# Patient Record
Sex: FEMALE | Race: BLACK OR AFRICAN AMERICAN | Employment: FULL TIME | URBAN - METROPOLITAN AREA
[De-identification: names, ages, dates, MRNs, and addresses within clinical notes are randomized per-mention and may not be internally consistent; named-entity substitution may affect disease eponyms.]

---

## 2017-11-15 ENCOUNTER — APPOINTMENT (OUTPATIENT)
Dept: GENERAL RADIOLOGY | Age: 52
End: 2017-11-15
Attending: PHYSICIAN ASSISTANT
Payer: SELF-PAY

## 2017-11-15 ENCOUNTER — HOSPITAL ENCOUNTER (EMERGENCY)
Age: 52
Discharge: HOME | End: 2017-11-15
Attending: EMERGENCY MEDICINE
Payer: SELF-PAY

## 2017-11-15 VITALS
TEMPERATURE: 98 F | HEIGHT: 67 IN | DIASTOLIC BLOOD PRESSURE: 78 MMHG | SYSTOLIC BLOOD PRESSURE: 126 MMHG | OXYGEN SATURATION: 97 % | BODY MASS INDEX: 45.99 KG/M2 | HEART RATE: 56 BPM | WEIGHT: 293 LBS | RESPIRATION RATE: 18 BRPM

## 2017-11-15 DIAGNOSIS — R10.9 FLANK PAIN: Primary | ICD-10-CM

## 2017-11-15 DIAGNOSIS — N39.0 URINARY TRACT INFECTION WITHOUT HEMATURIA, SITE UNSPECIFIED: ICD-10-CM

## 2017-11-15 LAB
ALBUMIN SERPL-MCNC: 3.5 G/DL (ref 3.4–5)
ALBUMIN/GLOB SERPL: 0.9 {RATIO} (ref 0.8–1.7)
ALP SERPL-CCNC: 48 U/L (ref 45–117)
ALT SERPL-CCNC: 14 U/L (ref 13–56)
ANION GAP SERPL CALC-SCNC: 4 MMOL/L (ref 3–18)
APPEARANCE UR: CLEAR
AST SERPL-CCNC: 14 U/L (ref 15–37)
BACTERIA URNS QL MICRO: ABNORMAL /HPF
BASOPHILS # BLD: 0 K/UL (ref 0–0.06)
BASOPHILS NFR BLD: 1 % (ref 0–2)
BILIRUB SERPL-MCNC: 0.4 MG/DL (ref 0.2–1)
BILIRUB UR QL: NEGATIVE
BUN SERPL-MCNC: 15 MG/DL (ref 7–18)
BUN/CREAT SERPL: 25 (ref 12–20)
CALCIUM SERPL-MCNC: 8.9 MG/DL (ref 8.5–10.1)
CHLORIDE SERPL-SCNC: 105 MMOL/L (ref 100–108)
CO2 SERPL-SCNC: 29 MMOL/L (ref 21–32)
COLOR UR: YELLOW
CREAT SERPL-MCNC: 0.59 MG/DL (ref 0.6–1.3)
DIFFERENTIAL METHOD BLD: ABNORMAL
EOSINOPHIL # BLD: 0.1 K/UL (ref 0–0.4)
EOSINOPHIL NFR BLD: 3 % (ref 0–5)
EPITH CASTS URNS QL MICRO: ABNORMAL /LPF (ref 0–5)
ERYTHROCYTE [DISTWIDTH] IN BLOOD BY AUTOMATED COUNT: 15.6 % (ref 11.6–14.5)
GLOBULIN SER CALC-MCNC: 3.9 G/DL (ref 2–4)
GLUCOSE SERPL-MCNC: 87 MG/DL (ref 74–99)
GLUCOSE UR STRIP.AUTO-MCNC: NEGATIVE MG/DL
HCT VFR BLD AUTO: 35.8 % (ref 35–45)
HGB BLD-MCNC: 11.1 G/DL (ref 12–16)
HGB UR QL STRIP: NEGATIVE
KETONES UR QL STRIP.AUTO: NEGATIVE MG/DL
LEUKOCYTE ESTERASE UR QL STRIP.AUTO: ABNORMAL
LYMPHOCYTES # BLD: 1.4 K/UL (ref 0.9–3.6)
LYMPHOCYTES NFR BLD: 32 % (ref 21–52)
MCH RBC QN AUTO: 25.6 PG (ref 24–34)
MCHC RBC AUTO-ENTMCNC: 31 G/DL (ref 31–37)
MCV RBC AUTO: 82.5 FL (ref 74–97)
MONOCYTES # BLD: 0.4 K/UL (ref 0.05–1.2)
MONOCYTES NFR BLD: 9 % (ref 3–10)
MUCOUS THREADS URNS QL MICRO: ABNORMAL /LPF
NEUTS SEG # BLD: 2.3 K/UL (ref 1.8–8)
NEUTS SEG NFR BLD: 55 % (ref 40–73)
NITRITE UR QL STRIP.AUTO: NEGATIVE
PH UR STRIP: 7 [PH] (ref 5–8)
PLATELET # BLD AUTO: 268 K/UL (ref 135–420)
PMV BLD AUTO: 8.9 FL (ref 9.2–11.8)
POTASSIUM SERPL-SCNC: 4 MMOL/L (ref 3.5–5.5)
PROT SERPL-MCNC: 7.4 G/DL (ref 6.4–8.2)
PROT UR STRIP-MCNC: NEGATIVE MG/DL
RBC # BLD AUTO: 4.34 M/UL (ref 4.2–5.3)
RBC #/AREA URNS HPF: ABNORMAL /HPF (ref 0–5)
SODIUM SERPL-SCNC: 138 MMOL/L (ref 136–145)
SP GR UR REFRACTOMETRY: 1.03 (ref 1–1.03)
UROBILINOGEN UR QL STRIP.AUTO: 1 EU/DL (ref 0.2–1)
WBC # BLD AUTO: 4.2 K/UL (ref 4.6–13.2)
WBC URNS QL MICRO: ABNORMAL /HPF (ref 0–4)

## 2017-11-15 PROCEDURE — 85025 COMPLETE CBC W/AUTO DIFF WBC: CPT | Performed by: PHYSICIAN ASSISTANT

## 2017-11-15 PROCEDURE — 96374 THER/PROPH/DIAG INJ IV PUSH: CPT

## 2017-11-15 PROCEDURE — 96361 HYDRATE IV INFUSION ADD-ON: CPT

## 2017-11-15 PROCEDURE — 74011250636 HC RX REV CODE- 250/636: Performed by: PHYSICIAN ASSISTANT

## 2017-11-15 PROCEDURE — 80053 COMPREHEN METABOLIC PANEL: CPT | Performed by: PHYSICIAN ASSISTANT

## 2017-11-15 PROCEDURE — 74000 XR ABD (KUB): CPT

## 2017-11-15 PROCEDURE — 87086 URINE CULTURE/COLONY COUNT: CPT | Performed by: PHYSICIAN ASSISTANT

## 2017-11-15 PROCEDURE — 81001 URINALYSIS AUTO W/SCOPE: CPT | Performed by: PHYSICIAN ASSISTANT

## 2017-11-15 PROCEDURE — 99283 EMERGENCY DEPT VISIT LOW MDM: CPT

## 2017-11-15 RX ORDER — KETOROLAC TROMETHAMINE 30 MG/ML
30 INJECTION, SOLUTION INTRAMUSCULAR; INTRAVENOUS
Status: COMPLETED | OUTPATIENT
Start: 2017-11-15 | End: 2017-11-15

## 2017-11-15 RX ORDER — CIPROFLOXACIN 500 MG/1
500 TABLET ORAL 2 TIMES DAILY
Qty: 14 TAB | Refills: 0 | Status: SHIPPED | OUTPATIENT
Start: 2017-11-15 | End: 2017-11-22

## 2017-11-15 RX ORDER — SODIUM CHLORIDE 9 MG/ML
1000 INJECTION, SOLUTION INTRAVENOUS ONCE
Status: COMPLETED | OUTPATIENT
Start: 2017-11-15 | End: 2017-11-15

## 2017-11-15 RX ORDER — TRAMADOL HYDROCHLORIDE 50 MG/1
50 TABLET ORAL
Qty: 12 TAB | Refills: 0 | Status: SHIPPED | OUTPATIENT
Start: 2017-11-15 | End: 2017-12-05

## 2017-11-15 RX ADMIN — SODIUM CHLORIDE 1000 ML: 900 INJECTION, SOLUTION INTRAVENOUS at 14:25

## 2017-11-15 RX ADMIN — KETOROLAC TROMETHAMINE 30 MG: 30 INJECTION, SOLUTION INTRAMUSCULAR at 14:25

## 2017-11-15 NOTE — ED NOTES
Current Discharge Medication List      START taking these medications    Details   ciprofloxacin HCl (CIPRO) 500 mg tablet Take 1 Tab by mouth two (2) times a day for 7 days. Qty: 14 Tab, Refills: 0      traMADol (ULTRAM) 50 mg tablet Take 1 Tab by mouth every six (6) hours as needed for Pain. Max Daily Amount: 200 mg. Qty: 12 Tab, Refills: 0           Patient armband removed and shredded  Prescriptions given and reviewed with patient.

## 2017-11-15 NOTE — ED PROVIDER NOTES
HPI Comments: 46 yr old female, hx asthma presents to the ED complaining of R flank and low back pain x 2 days. Pt denies urinary sx, back injury radiation of pain into the legs or abdomen, and numbness/tingling. Denies any hx of kidney stones. No other complaints. Patient is a 46 y.o. female presenting with flank pain. Flank Pain    Pertinent negatives include no chest pain, no fever, no headaches, no abdominal pain, no dysuria and no weakness. Past Medical History:   Diagnosis Date    Asthma        Past Surgical History:   Procedure Laterality Date    HX  SECTION           History reviewed. No pertinent family history. Social History     Social History    Marital status:      Spouse name: N/A    Number of children: N/A    Years of education: N/A     Occupational History    Not on file. Social History Main Topics    Smoking status: Never Smoker    Smokeless tobacco: Not on file    Alcohol use No    Drug use: No    Sexual activity: Not on file     Other Topics Concern    Not on file     Social History Narrative         ALLERGIES: Tetanus and diphtheria toxoids    Review of Systems   Constitutional: Negative. Negative for chills, diaphoresis, fatigue and fever. HENT: Negative. Negative for congestion, ear pain, rhinorrhea and sore throat. Eyes: Negative. Negative for pain and redness. Respiratory: Negative. Negative for cough, shortness of breath, wheezing and stridor. Cardiovascular: Negative. Negative for chest pain and leg swelling. Gastrointestinal: Negative. Negative for abdominal pain, constipation, diarrhea, nausea and vomiting. Endocrine: Negative. Genitourinary: Positive for flank pain. Negative for dysuria, frequency and hematuria. Musculoskeletal: Positive for back pain. Negative for neck pain and neck stiffness. Skin: Negative. Negative for rash and wound. Allergic/Immunologic: Negative. Neurological: Negative.   Negative for dizziness, seizures, syncope, weakness and headaches. Hematological: Negative. Psychiatric/Behavioral: Negative. All other systems reviewed and are negative. Vitals:    11/15/17 1318   BP: (!) 146/101   Pulse: 69   Resp: 18   Temp: 98 °F (36.7 °C)   SpO2: 97%   Weight: (!) 186 kg (410 lb)   Height: 5' 7\" (1.702 m)            Physical Exam   Constitutional: She is oriented to person, place, and time. She appears well-developed and well-nourished. She appears distressed. Morbidly obese, Pt appear minimally distressed in the exam room    HENT:   Head: Normocephalic. Neck: Normal range of motion. Neck supple. Cardiovascular: Normal rate, regular rhythm and normal heart sounds. Exam reveals no gallop and no friction rub. No murmur heard. Pulmonary/Chest: Effort normal and breath sounds normal. No stridor. No respiratory distress. She has no wheezes. She has no rales. R CVA TTP    Musculoskeletal: Normal range of motion. She exhibits tenderness. She exhibits no edema or deformity. Diffuse TTP noted along the R lumbar and thoracic paraspinal muscles, no radicular or referred pain elicited. No specific point tenderness noted. Neurological: She is alert and oriented to person, place, and time. Coordination normal.   Gait is steady. Able to ambulate without difficulty. Skin: Skin is warm and dry. No rash noted. She is not diaphoretic. No erythema. Psychiatric: She has a normal mood and affect. Her behavior is normal. Thought content normal.   Nursing note and vitals reviewed. MDM  Number of Diagnoses or Management Options  Diagnosis management comments: Impression:  Flank pain, UTI    KUB no evidence of calcific renal stones seen, unremarkable for acute process, will not  IV inserted, 1 L saline, 30 mg toradol given     WBC 4.2, hgb 11.1, RDW 15.6, MPLV 8.9, Cr 0.59, BUCR 25, SGOT 14, UA: trace leuk esterase.  2 + bacteria, urine sent for culture    Recheck BP WNL  Pt denies hx of htn. Pt is morbidly obese. Will plan to give her information for dietary aproach to controlling htn and recommend PCP follow-up. Patient is stable for discharge at this time. Rx for cipro and short course of ultram given. Rest and follow-up with PCP this week. Return to the ED immediately for any new or worsening sx.  Chandni Arrington PA-C 3:06 PM         Amount and/or Complexity of Data Reviewed  Clinical lab tests: reviewed and ordered  Tests in the radiology section of CPT®: ordered and reviewed    Risk of Complications, Morbidity, and/or Mortality  Presenting problems: moderate  Diagnostic procedures: moderate  Management options: moderate    Patient Progress  Patient progress: stable    ED Course       Procedures

## 2017-11-15 NOTE — DISCHARGE INSTRUCTIONS
[employer] Activation    Thank you for requesting access to [employer]. Please follow the instructions below to securely access and download your online medical record. [employer] allows you to send messages to your doctor, view your test results, renew your prescriptions, schedule appointments, and more. How Do I Sign Up? 1. In your internet browser, go to [email or web address]  2. Click on the First Time User? Click Here link in the Sign In box. You will be redirect to the New Member Sign Up page. 3. Enter your [employer] Access Code exactly as it appears below. You will not need to use this code after youve completed the sign-up process. If you do not sign up before the expiration date, you must request a new code. [employer] Access Code: [email or web address]  Expires: 2018  3:07 PM (This is the date your [employer] access code will )    4. Enter the last four digits of your Social Security Number (xxxx) and Date of Birth (mm/dd/yyyy) as indicated and click Submit. You will be taken to the next sign-up page. 5. Create a [employer] ID. This will be your [employer] login ID and cannot be changed, so think of one that is secure and easy to remember. 6. Create a [employer] password. You can change your password at any time. 7. Enter your Password Reset Question and Answer. This can be used at a later time if you forget your password. 8. Enter your e-mail address. You will receive e-mail notification when new information is available in [address] 9. Click Sign Up. You can now view and download portions of your medical record. 10. Click the Download Summary menu link to download a portable copy of your medical information. Additional Information    If you have questions, please visit the Frequently Asked Questions section of the [employer] website at [email or web address] Remember, [employer] is NOT to be used for urgent needs. For medical emergencies, dial 911.

## 2017-11-15 NOTE — ED TRIAGE NOTES
Patient c/o right flank pain x 2 days. Denies urinary difficulties or hx of kidney stones. Denies injury.

## 2017-11-17 LAB
BACTERIA SPEC CULT: NORMAL
SERVICE CMNT-IMP: NORMAL

## 2017-12-05 ENCOUNTER — HOSPITAL ENCOUNTER (EMERGENCY)
Age: 52
Discharge: HOME | End: 2017-12-05
Attending: EMERGENCY MEDICINE
Payer: SELF-PAY

## 2017-12-05 VITALS
SYSTOLIC BLOOD PRESSURE: 163 MMHG | TEMPERATURE: 98 F | RESPIRATION RATE: 22 BRPM | OXYGEN SATURATION: 100 % | HEART RATE: 83 BPM | DIASTOLIC BLOOD PRESSURE: 80 MMHG

## 2017-12-05 DIAGNOSIS — M54.10 RADICULOPATHY AFFECTING UPPER EXTREMITY: Primary | ICD-10-CM

## 2017-12-05 DIAGNOSIS — R03.0 SYSTOLIC BLOOD PRESSURE GREATER THAN OR EQUAL TO 140 MM HG: ICD-10-CM

## 2017-12-05 LAB
ATRIAL RATE: 66 BPM
CALCULATED P AXIS, ECG09: 40 DEGREES
CALCULATED R AXIS, ECG10: 17 DEGREES
CALCULATED T AXIS, ECG11: 40 DEGREES
DIAGNOSIS, 93000: NORMAL
P-R INTERVAL, ECG05: 158 MS
Q-T INTERVAL, ECG07: 372 MS
QRS DURATION, ECG06: 92 MS
QTC CALCULATION (BEZET), ECG08: 389 MS
VENTRICULAR RATE, ECG03: 66 BPM

## 2017-12-05 PROCEDURE — 74011250636 HC RX REV CODE- 250/636: Performed by: EMERGENCY MEDICINE

## 2017-12-05 PROCEDURE — 93005 ELECTROCARDIOGRAM TRACING: CPT

## 2017-12-05 PROCEDURE — 99284 EMERGENCY DEPT VISIT MOD MDM: CPT

## 2017-12-05 PROCEDURE — 96374 THER/PROPH/DIAG INJ IV PUSH: CPT

## 2017-12-05 RX ORDER — CYCLOBENZAPRINE HCL 10 MG
10 TABLET ORAL
Qty: 20 TAB | Refills: 0 | Status: SHIPPED | OUTPATIENT
Start: 2017-12-05 | End: 2018-02-16

## 2017-12-05 RX ORDER — KETOROLAC TROMETHAMINE 30 MG/ML
15 INJECTION, SOLUTION INTRAMUSCULAR; INTRAVENOUS
Status: COMPLETED | OUTPATIENT
Start: 2017-12-05 | End: 2017-12-05

## 2017-12-05 RX ORDER — HYDROCODONE BITARTRATE AND ACETAMINOPHEN 5; 325 MG/1; MG/1
1 TABLET ORAL
Qty: 20 TAB | Refills: 0 | Status: SHIPPED | OUTPATIENT
Start: 2017-12-05 | End: 2018-02-16

## 2017-12-05 RX ORDER — PREDNISONE 20 MG/1
60 TABLET ORAL
Qty: 18 TAB | Refills: 0 | Status: SHIPPED | OUTPATIENT
Start: 2017-12-05 | End: 2018-02-16

## 2017-12-05 RX ORDER — NAPROXEN 375 MG/1
375 TABLET ORAL 2 TIMES DAILY WITH MEALS
Qty: 30 TAB | Refills: 0 | Status: SHIPPED | OUTPATIENT
Start: 2017-12-05 | End: 2018-02-16

## 2017-12-05 RX ADMIN — KETOROLAC TROMETHAMINE 15 MG: 30 INJECTION, SOLUTION INTRAMUSCULAR at 10:55

## 2017-12-05 NOTE — ED TRIAGE NOTES
C/O left sided neck, shoulder and arm pain x 1 week and numbness to left hand x 2 days. Denies injury.

## 2017-12-05 NOTE — ED PROVIDER NOTES
EMERGENCY DEPARTMENT HISTORY AND PHYSICAL EXAM    10:32 AM      Date: 2017  Patient Name: Kaila Dent    History of Presenting Illness     Chief Complaint   Patient presents with    Arm Pain    Neck Pain    Numbness         History Provided By: Patient    Chief Complaint: Neck Pain  Duration: 2 Weeks  Timing:  Constant and Progressive  Location: Left sided radiating down LUE  Quality:   Severity:  Modifying Factors: OTC pain meds without relief   Associated Symptoms: numbness of left thumb x 2days      Additional History (Context): Kaila Dent is a 46 y.o. female with asthma who presents to ED with c/o constant and progressive left sided neck pain that radiates down LUE onset 2 weeks. Pt states pain has increased and numbness of left thumb onset 2 days. Pt reports use of OTC pain medication without sx relief.  present at bedside. PCP: None        Past History     Past Medical History:  Past Medical History:   Diagnosis Date    Asthma        Past Surgical History:  Past Surgical History:   Procedure Laterality Date    HX  SECTION         Family History:  History reviewed. No pertinent family history. Social History:  Social History   Substance Use Topics    Smoking status: Never Smoker    Smokeless tobacco: None    Alcohol use No       Allergies: Allergies   Allergen Reactions    Tetanus And Diphtheria Toxoids Hives and Nausea and Vomiting         Review of Systems       Review of Systems   Constitutional: Negative for chills and fever. HENT: Negative for sore throat. Respiratory: Negative for shortness of breath. Cardiovascular: Negative for chest pain. Gastrointestinal: Negative for diarrhea and vomiting. Musculoskeletal: Positive for neck pain. Skin: Negative for rash. Neurological: Negative for headaches.          Physical Exam     Visit Vitals    /80 (BP 1 Location: Right arm, BP Patient Position: At rest)    Pulse 83    Temp 98 °F (36.7 °C)    Resp 22    LMP 11/04/2017    SpO2 100%         Physical Exam   Constitutional: She is oriented to person, place, and time. She appears well-developed and well-nourished. No distress. HENT:   Head: Normocephalic and atraumatic. Eyes: No scleral icterus. Neck:   Tender L trapezius ridge, rhomboids and lateral deltoid muscle extending to lat humerus. No midline bony tenderness. Cardiovascular: Normal rate and regular rhythm. No murmur heard. Pulmonary/Chest: Breath sounds normal. She is in respiratory distress. Musculoskeletal:   Subjective paresthesia along distal radial nerve distribution L hand. Pulse intact. Motor intact. Neurological: She is alert and oriented to person, place, and time. Skin: Skin is warm and dry. Psychiatric: She has a normal mood and affect. Nursing note and vitals reviewed. Diagnostic Study Results     Labs -  Recent Results (from the past 12 hour(s))   EKG, 12 LEAD, INITIAL    Collection Time: 12/05/17 10:20 AM   Result Value Ref Range    Ventricular Rate 66 BPM    Atrial Rate 66 BPM    P-R Interval 158 ms    QRS Duration 92 ms    Q-T Interval 372 ms    QTC Calculation (Bezet) 389 ms    Calculated P Axis 40 degrees    Calculated R Axis 17 degrees    Calculated T Axis 40 degrees    Diagnosis       Normal sinus rhythm with sinus arrhythmia  Normal ECG  No previous ECGs available         Radiologic Studies -   No orders to display         Medical Decision Making   I am the first provider for this patient. I reviewed the vital signs, available nursing notes, past medical history, past surgical history, family history and social history. Vital Signs-Reviewed the patient's vital signs. EKG: Interpreted by the EP. Time Interpreted: 1561. NSR, normal intervals and axis,, no st elevations        ED Course: Progress Notes, Reevaluation, and Consults:  Imp: LUE radiculopathy w/ likely impingement at level of trapezius. Has radial nerve paresthesia. No midline tenderness. Classic \"pinched nerve\" presentation. No findings to suggest referred cardiac and EKG ok. Diagnosis     Clinical Impression:   1. Radiculopathy affecting upper extremity    2. Systolic blood pressure greater than or equal to 140 mm Hg      1) Prednisone tape  2) Naproxen  3) Flexeril  4) Norco  5) Stretching as tolerated  6) Systolic blood pressure elevated and requires follow up  7) Follow up with PCP or [facility] for recheck in 7-10 days    Disposition: home    Follow-up Information     Follow up With Details Comments [address] [facility] In 1 week  [address] [facility]  [phone]           Patient's Medications   Start Taking    CYCLOBENZAPRINE (FLEXERIL) 10 MG TABLET    Take 1 Tab by mouth three (3) times daily as needed for Muscle Spasm(s). HYDROCODONE-ACETAMINOPHEN (NORCO) 5-325 MG PER TABLET    Take 1 Tab by mouth every four (4) hours as needed for Pain. Max Daily Amount: 6 Tabs. NAPROXEN (NAPROSYN) 375 MG TABLET    Take 1 Tab by mouth two (2) times daily (with meals). PREDNISONE (DELTASONE) 20 MG TABLET    Take 3 Tabs by mouth daily (with breakfast). 60 MG DAILY X 3 DAYS, THEN 40 MG DAILY X 3 DAYS, THEN 20 MG DAILY X 3 DAYS   Continue Taking    No medications on file   These Medications have changed    No medications on file   Stop Taking    TRAMADOL (ULTRAM) 50 MG TABLET    Take 1 Tab by mouth every six (6) hours as needed for Pain. Max Daily Amount: 200 mg.     _______________________________    Attestations:  Scribe Attestation     Chavez Alvarado acting as a scribe for and in the presence of Macarena Rodrigues MD      December 05, 2017 at 10:39 AM       Provider Attestation:      I personally performed the services described in the documentation, reviewed the documentation, as recorded by the scribe in my presence, and it accurately and completely records my words and actions. December 05, 2017 at 10:39 JONO Norman, MD    _______________________________

## 2017-12-05 NOTE — LETTER
NOTIFICATION RETURN TO WORK / SCHOOL 
 
12/5/2017 11:40 AM 
 
Ms. Dalila Ryan 
[address] [facility] [address] [facility] [address] To Whom It May Concern: 
 
Dalila Ryan is currently under the care of [id number] [facility] EMERGENCY DEPT. She will return to work/school on: 12/7/17 If there are questions or concerns please have the patient contact our office. Sincerely, Jazmin Agarwal RN

## 2018-02-16 ENCOUNTER — APPOINTMENT (OUTPATIENT)
Dept: GENERAL RADIOLOGY | Age: 53
End: 2018-02-16
Attending: PHYSICIAN ASSISTANT
Payer: SELF-PAY

## 2018-02-16 ENCOUNTER — HOSPITAL ENCOUNTER (EMERGENCY)
Age: 53
Discharge: HOME | End: 2018-02-16
Attending: EMERGENCY MEDICINE
Payer: SELF-PAY

## 2018-02-16 VITALS
TEMPERATURE: 98.4 F | DIASTOLIC BLOOD PRESSURE: 86 MMHG | RESPIRATION RATE: 19 BRPM | SYSTOLIC BLOOD PRESSURE: 131 MMHG | BODY MASS INDEX: 45.99 KG/M2 | OXYGEN SATURATION: 99 % | HEIGHT: 67 IN | WEIGHT: 293 LBS | HEART RATE: 97 BPM

## 2018-02-16 DIAGNOSIS — R11.2 NON-INTRACTABLE VOMITING WITH NAUSEA, UNSPECIFIED VOMITING TYPE: ICD-10-CM

## 2018-02-16 DIAGNOSIS — R68.89 FLU-LIKE SYMPTOMS: Primary | ICD-10-CM

## 2018-02-16 DIAGNOSIS — R05.9 COUGH: ICD-10-CM

## 2018-02-16 PROCEDURE — 99283 EMERGENCY DEPT VISIT LOW MDM: CPT

## 2018-02-16 PROCEDURE — 71046 X-RAY EXAM CHEST 2 VIEWS: CPT

## 2018-02-16 RX ORDER — BENZONATATE 100 MG/1
100 CAPSULE ORAL
Qty: 30 CAP | Refills: 0 | Status: SHIPPED | OUTPATIENT
Start: 2018-02-16 | End: 2018-02-23

## 2018-02-16 RX ORDER — ALBUTEROL SULFATE 90 UG/1
AEROSOL, METERED RESPIRATORY (INHALATION)
COMMUNITY
End: 2020-08-08

## 2018-02-16 RX ORDER — ONDANSETRON 4 MG/1
4 TABLET, ORALLY DISINTEGRATING ORAL
Qty: 15 TAB | Refills: 0 | Status: SHIPPED | OUTPATIENT
Start: 2018-02-16 | End: 2019-04-05

## 2018-02-16 NOTE — LETTER
[address] [place] [address] [facility] EMERGENCY DEPT 
[address] [facility] [address] [phone] Work/School Note Date: 2/16/2018 To Whom It May concern: 
 
Oneil Aschoff was seen and treated today in the emergency room by the following provider(s): 
Attending Provider: Nathaniel Echevarria MD 
Physician Assistant: Sae Alfaro PA-C. Oneil Aschoff may return to work on 2/18/2018.  
 
Sincerely, 
 
 
 
 
Diane Evans RN

## 2018-02-16 NOTE — ED PROVIDER NOTES
EMERGENCY DEPARTMENT HISTORY AND PHYSICAL EXAM    2:14 PM      Date: 2018  Patient Name: Annie Brennan    History of Presenting Illness     Chief Complaint   Patient presents with    Flu         History Provided By: Patient    Chief Complaint: flu like symptoms   Duration: 1 Weeks  Timing:  Progressive  Location:    Quality:   Severity: Moderate  Modifying Factors:    Associated Symptoms: productive cough, vomiting, chills, sore throat, rhinorrhea, congestion       Additional History (Context): Annie Brennan is a 48 y.o. female with asthma who presents with flu like symptoms x 1 week. Vomiting daily, but still has been able to keep down fluids and rehydrate. No diarrhea or abdominal pain. Denies nasal congestion, rhinorrhea, ear pain, sore throat, sinus pressure. Feels feverish. No chest pain or shortness of breath. PCP: None    Current Outpatient Prescriptions   Medication Sig Dispense Refill    albuterol (PROVENTIL HFA, VENTOLIN HFA, PROAIR HFA) 90 mcg/actuation inhaler Take  by inhalation.  ondansetron (ZOFRAN ODT) 4 mg disintegrating tablet Take 1 Tab by mouth every eight (8) hours as needed for Nausea. 15 Tab 0    benzonatate (TESSALON PERLES) 100 mg capsule Take 1 Cap by mouth three (3) times daily as needed for Cough for up to 7 days. 30 Cap 0       Past History     Past Medical History:  Past Medical History:   Diagnosis Date    Asthma        Past Surgical History:  Past Surgical History:   Procedure Laterality Date    HX  SECTION         Family History:  No family history on file. Social History:  Social History   Substance Use Topics    Smoking status: Never Smoker    Smokeless tobacco: Not on file    Alcohol use No       Allergies: Allergies   Allergen Reactions    Tetanus And Diphtheria Toxoids Hives and Nausea and Vomiting         Review of Systems       Review of Systems   Constitutional: Negative for fever.    HENT: Positive for congestion, rhinorrhea and sore throat. Negative for facial swelling. Eyes: Negative for visual disturbance. Respiratory: Positive for cough. Negative for shortness of breath. Cardiovascular: Negative for chest pain. Gastrointestinal: Positive for nausea and vomiting. Negative for abdominal pain and diarrhea. Genitourinary: Negative for dysuria. Musculoskeletal: Negative for neck pain. Skin: Negative for rash. Neurological: Negative for dizziness. Psychiatric/Behavioral: Negative for confusion. All other systems reviewed and are negative. Physical Exam     Visit Vitals    /86 (BP 1 Location: Left arm, BP Patient Position: At rest)    Pulse 97    Temp 98.4 °F (36.9 °C)    Resp 19    Ht 5' 7\" (1.702 m)    Wt 154.2 kg (340 lb)    SpO2 99%    BMI 53.25 kg/m2         Physical Exam   Constitutional: She is oriented to person, place, and time. She appears well-developed and well-nourished. No distress. HENT:   Head: Normocephalic and atraumatic. Right Ear: Tympanic membrane, external ear and ear canal normal.   Left Ear: Tympanic membrane, external ear and ear canal normal.   Nose: Nose normal. Right sinus exhibits no maxillary sinus tenderness and no frontal sinus tenderness. Left sinus exhibits no maxillary sinus tenderness and no frontal sinus tenderness. Mouth/Throat: Uvula is midline, oropharynx is clear and moist and mucous membranes are normal. No oropharyngeal exudate, posterior oropharyngeal edema, posterior oropharyngeal erythema or tonsillar abscesses. Eyes: Conjunctivae are normal.   Neck: Normal range of motion. Neck supple. Cardiovascular: Normal rate, regular rhythm and normal heart sounds. Pulmonary/Chest: Effort normal and breath sounds normal.   Abdominal: Soft. Bowel sounds are normal. She exhibits no distension. There is no tenderness. Musculoskeletal: Normal range of motion. Lymphadenopathy:     She has no cervical adenopathy.    Neurological: She is alert and oriented to person, place, and time. Skin: Skin is warm and dry. She is not diaphoretic. Psychiatric: She has a normal mood and affect. Nursing note and vitals reviewed. Diagnostic Study Results     Labs -  No results found for this or any previous visit (from the past 12 hour(s)). Radiologic Studies -   XR CHEST PA LAT    (Results Pending)         Medical Decision Making   I am the first provider for this patient. I reviewed the vital signs, available nursing notes, past medical history, past surgical history, family history and social history. Vital Signs-Reviewed the patient's vital signs. Records Reviewed: Nursing Notes (Time of Review: 2:14 PM)    ED Course: Progress Notes, Reevaluation, and Consults:      Provider Notes (Medical Decision Making): MDM  Number of Diagnoses or Management Options  Cough: new, needed workup  Flu-like symptoms: new, needed workup  Non-intractable vomiting with nausea, unspecified vomiting type: new, needed workup  Diagnosis management comments: 50yo F with flu like symptoms x 1 week. Has been hydrating, does not require IVF today. Afebrile, vitals stable. ENT ecxam normal.  Lungs clear. Abdomen nontender. CXR without obvious signs of pneumonia based on independent assessment of images. Treat symptoms. Outside range for tamiflu. Discussed treatment plan, return precautions, symptomatic relief, and expected time to improvement. All questions answered. Patient is stable for discharge and outpatient management. Diagnosis     Clinical Impression:   1. Flu-like symptoms    2. Cough    3.  Non-intractable vomiting with nausea, unspecified vomiting type        Disposition:     Follow-up Information     Follow up With Details Comments Contact Info    [facility] EMERGENCY DEPT In 3 days If symptoms do not improve [address] [address]  [address] [facility] EMERGENCY DEPT  Immediately if symptoms worsen [address] [facility] [address] [address]  [phone]           Patient's Medications   Start Taking    BENZONATATE (TESSALON PERLES) 100 MG CAPSULE    Take 1 Cap by mouth three (3) times daily as needed for Cough for up to 7 days. ONDANSETRON (ZOFRAN ODT) 4 MG DISINTEGRATING TABLET    Take 1 Tab by mouth every eight (8) hours as needed for Nausea. Continue Taking    ALBUTEROL (PROVENTIL HFA, VENTOLIN HFA, PROAIR HFA) 90 MCG/ACTUATION INHALER    Take  by inhalation. These Medications have changed    No medications on file   Stop Taking    CYCLOBENZAPRINE (FLEXERIL) 10 MG TABLET    Take 1 Tab by mouth three (3) times daily as needed for Muscle Spasm(s). HYDROCODONE-ACETAMINOPHEN (NORCO) 5-325 MG PER TABLET    Take 1 Tab by mouth every four (4) hours as needed for Pain. Max Daily Amount: 6 Tabs. NAPROXEN (NAPROSYN) 375 MG TABLET    Take 1 Tab by mouth two (2) times daily (with meals). PREDNISONE (DELTASONE) 20 MG TABLET    Take 3 Tabs by mouth daily (with breakfast). 60 MG DAILY X 3 DAYS, THEN 40 MG DAILY X 3 DAYS, THEN 20 MG DAILY X 3 DAYS     _______________________________    Attestations:  Scribe Attestation     Chico Eng PA-C acting as a scribe for and in the presence of Noemi Calhoun PA-C      February 16, 2018 at 3:07 PM       Provider Attestation:      I personally performed the services described in the documentation, reviewed the documentation, as recorded by the scribe in my presence, and it accurately and completely records my words and actions.  February 16, 2018 at 3:07 PM - Noemi Calhoun PA-C  _______________________________

## 2018-02-16 NOTE — LETTER
[facility] EMERGENCY DEPT 
[address] [facility] [address] 
[phone] Work/School Note Date: 2/16/2018 To Whom It May concern: 
 
Abilio Vazquez was seen and treated today in the emergency room by the following provider(s): 
Attending Provider: Nick Abbott MD 
Physician Assistant: Yoselyn Thompson PA-C. Abilio Vazquez may return to work on 2/19/2018.  
 
Sincerely, 
 
 
 
 
Marla Tesfaye RN

## 2018-02-16 NOTE — DISCHARGE INSTRUCTIONS
Drinking warm liquids, gargling with warm salt water, and throat lozenges may help with symptoms. An allergy medication combined with a decongestant (Allegra-D, Claritin-D, etc) should be taken daily. Saline nasal sprays or Net-Pots can be purchased over the counter to help reduce nasal congestion. Get rest and take a multivitamin daily to boost the immune system. Return to ED for any worsening or new symptoms such as throat swelling, high fevers, shortness of breath, vomiting, or if symptoms do not improve. It is important to stay hydrated during episodes of diarrhea and/or vomiting. Return to ER for worsening abdominal pain, high fevers, blood in stool or vomit, severe vomiting, diarrhea greater than 7 days, dehydration or inability to keep down liquids, or other worsening symptoms. Return to ED if symptoms have not improved in 24-48 hours. Nausea and Vomiting: Care Instructions  Your Care Instructions    When you are nauseated, you may feel weak and sweaty and notice a lot of saliva in your mouth. Nausea often leads to vomiting. Most of the time you do not need to worry about nausea and vomiting, but they can be signs of other illnesses. Two common causes of nausea and vomiting are stomach flu and food poisoning. Nausea and vomiting from viral stomach flu will usually start to improve within 24 hours. Nausea and vomiting from food poisoning may last from 12 to 48 hours. The doctor has checked you carefully, but problems can develop later. If you notice any problems or new symptoms, get medical treatment right away. Follow-up care is a key part of your treatment and safety. Be sure to make and go to all appointments, and call your doctor if you are having problems. It's also a good idea to know your test results and keep a list of the medicines you take. How can you care for yourself at home?   · To prevent dehydration, drink plenty of fluids, enough so that your urine is light yellow or clear like water. Choose water and other caffeine-free clear liquids until you feel better. If you have kidney, heart, or liver disease and have to limit fluids, talk with your doctor before you increase the amount of fluids you drink. · Rest in bed until you feel better. · When you are able to eat, try clear soups, mild foods, and liquids until all symptoms are gone for 12 to 48 hours. Other good choices include dry toast, crackers, cooked cereal, and gelatin dessert, such as Jell-O. When should you call for help? Call 911 anytime you think you may need emergency care. For example, call if:  ? · You passed out (lost consciousness). ?Call your doctor now or seek immediate medical care if:  ? · You have symptoms of dehydration, such as:  ¨ Dry eyes and a dry mouth. ¨ Passing only a little dark urine. ¨ Feeling thirstier than usual.   ? · You have new or worsening belly pain. ? · You have a new or higher fever. ? · You vomit blood or what looks like coffee grounds. ? Watch closely for changes in your health, and be sure to contact your doctor if:  ? · You have ongoing nausea and vomiting. ? · Your vomiting is getting worse. ? · Your vomiting lasts longer than 2 days. ? · You are not getting better as expected. Where can you learn more? Go to http://ingris-maria teresa.info/. Enter [phone] [id number] in the search box to learn more about \"Nausea and Vomiting: Care Instructions. \"  Current as of: March 20, 2017  Content Version: 11.4  © 3846-4183 Healthwise, [employer]. Care instructions adapted under license by [employer] (which disclaims liability or warranty for this information). If you have questions about a medical condition or this instruction, always ask your healthcare professional. [address] any warranty or liability for your use of this information.

## 2019-04-05 ENCOUNTER — APPOINTMENT (OUTPATIENT)
Dept: CT IMAGING | Age: 54
End: 2019-04-05
Attending: PHYSICIAN ASSISTANT
Payer: SELF-PAY

## 2019-04-05 ENCOUNTER — HOSPITAL ENCOUNTER (EMERGENCY)
Age: 54
Discharge: HOME | End: 2019-04-05
Attending: EMERGENCY MEDICINE
Payer: SELF-PAY

## 2019-04-05 VITALS
HEART RATE: 64 BPM | DIASTOLIC BLOOD PRESSURE: 62 MMHG | BODY MASS INDEX: 45.99 KG/M2 | HEIGHT: 67 IN | TEMPERATURE: 98.1 F | SYSTOLIC BLOOD PRESSURE: 121 MMHG | WEIGHT: 293 LBS | RESPIRATION RATE: 17 BRPM | OXYGEN SATURATION: 94 %

## 2019-04-05 DIAGNOSIS — R10.9 FLANK PAIN: ICD-10-CM

## 2019-04-05 DIAGNOSIS — N39.0 URINARY TRACT INFECTION WITH HEMATURIA, SITE UNSPECIFIED: Primary | ICD-10-CM

## 2019-04-05 DIAGNOSIS — R31.9 URINARY TRACT INFECTION WITH HEMATURIA, SITE UNSPECIFIED: Primary | ICD-10-CM

## 2019-04-05 LAB
ALBUMIN SERPL-MCNC: 3.7 G/DL (ref 3.4–5)
ALBUMIN/GLOB SERPL: 0.9 {RATIO} (ref 0.8–1.7)
ALP SERPL-CCNC: 55 U/L (ref 45–117)
ALT SERPL-CCNC: 15 U/L (ref 13–56)
ANION GAP SERPL CALC-SCNC: 7 MMOL/L (ref 3–18)
APPEARANCE UR: ABNORMAL
AST SERPL-CCNC: 14 U/L (ref 15–37)
BACTERIA URNS QL MICRO: ABNORMAL /HPF
BASOPHILS # BLD: 0 K/UL (ref 0–0.1)
BASOPHILS NFR BLD: 1 % (ref 0–2)
BILIRUB SERPL-MCNC: 0.4 MG/DL (ref 0.2–1)
BILIRUB UR QL: NEGATIVE
BUN SERPL-MCNC: 18 MG/DL (ref 7–18)
BUN/CREAT SERPL: 23 (ref 12–20)
CALCIUM SERPL-MCNC: 8.9 MG/DL (ref 8.5–10.1)
CHLORIDE SERPL-SCNC: 105 MMOL/L (ref 100–108)
CO2 SERPL-SCNC: 29 MMOL/L (ref 21–32)
COLOR UR: ABNORMAL
CREAT SERPL-MCNC: 0.77 MG/DL (ref 0.6–1.3)
DIFFERENTIAL METHOD BLD: ABNORMAL
EOSINOPHIL # BLD: 0.1 K/UL (ref 0–0.4)
EOSINOPHIL NFR BLD: 2 % (ref 0–5)
EPITH CASTS URNS QL MICRO: ABNORMAL /LPF (ref 0–5)
ERYTHROCYTE [DISTWIDTH] IN BLOOD BY AUTOMATED COUNT: 15.9 % (ref 11.6–14.5)
GLOBULIN SER CALC-MCNC: 4.1 G/DL (ref 2–4)
GLUCOSE SERPL-MCNC: 81 MG/DL (ref 74–99)
GLUCOSE UR STRIP.AUTO-MCNC: NEGATIVE MG/DL
HCT VFR BLD AUTO: 37.7 % (ref 35–45)
HGB BLD-MCNC: 11.7 G/DL (ref 12–16)
HGB UR QL STRIP: ABNORMAL
KETONES UR QL STRIP.AUTO: NEGATIVE MG/DL
LEUKOCYTE ESTERASE UR QL STRIP.AUTO: ABNORMAL
LIPASE SERPL-CCNC: 117 U/L (ref 73–393)
LYMPHOCYTES # BLD: 1.6 K/UL (ref 0.9–3.6)
LYMPHOCYTES NFR BLD: 39 % (ref 21–52)
MCH RBC QN AUTO: 25.6 PG (ref 24–34)
MCHC RBC AUTO-ENTMCNC: 31 G/DL (ref 31–37)
MCV RBC AUTO: 82.5 FL (ref 74–97)
MONOCYTES # BLD: 0.3 K/UL (ref 0.05–1.2)
MONOCYTES NFR BLD: 9 % (ref 3–10)
NEUTS SEG # BLD: 2 K/UL (ref 1.8–8)
NEUTS SEG NFR BLD: 49 % (ref 40–73)
NITRITE UR QL STRIP.AUTO: NEGATIVE
PH UR STRIP: 5 [PH] (ref 5–8)
PLATELET # BLD AUTO: 276 K/UL (ref 135–420)
PMV BLD AUTO: 9.5 FL (ref 9.2–11.8)
POTASSIUM SERPL-SCNC: 3.7 MMOL/L (ref 3.5–5.5)
PROT SERPL-MCNC: 7.8 G/DL (ref 6.4–8.2)
PROT UR STRIP-MCNC: NEGATIVE MG/DL
RBC # BLD AUTO: 4.57 M/UL (ref 4.2–5.3)
RBC #/AREA URNS HPF: ABNORMAL /HPF (ref 0–5)
SODIUM SERPL-SCNC: 141 MMOL/L (ref 136–145)
SP GR UR REFRACTOMETRY: 1.03 (ref 1–1.03)
UROBILINOGEN UR QL STRIP.AUTO: 1 EU/DL (ref 0.2–1)
WBC # BLD AUTO: 4 K/UL (ref 4.6–13.2)
WBC URNS QL MICRO: ABNORMAL /HPF (ref 0–4)

## 2019-04-05 PROCEDURE — 96361 HYDRATE IV INFUSION ADD-ON: CPT

## 2019-04-05 PROCEDURE — 74011250636 HC RX REV CODE- 250/636: Performed by: PHYSICIAN ASSISTANT

## 2019-04-05 PROCEDURE — 96375 TX/PRO/DX INJ NEW DRUG ADDON: CPT

## 2019-04-05 PROCEDURE — 87086 URINE CULTURE/COLONY COUNT: CPT

## 2019-04-05 PROCEDURE — 83690 ASSAY OF LIPASE: CPT

## 2019-04-05 PROCEDURE — 96374 THER/PROPH/DIAG INJ IV PUSH: CPT

## 2019-04-05 PROCEDURE — 81001 URINALYSIS AUTO W/SCOPE: CPT

## 2019-04-05 PROCEDURE — 74176 CT ABD & PELVIS W/O CONTRAST: CPT

## 2019-04-05 PROCEDURE — 80053 COMPREHEN METABOLIC PANEL: CPT

## 2019-04-05 PROCEDURE — 85025 COMPLETE CBC W/AUTO DIFF WBC: CPT

## 2019-04-05 PROCEDURE — 99283 EMERGENCY DEPT VISIT LOW MDM: CPT

## 2019-04-05 RX ORDER — NAPROXEN 500 MG/1
500 TABLET ORAL 2 TIMES DAILY WITH MEALS
Qty: 20 TAB | Refills: 0 | Status: SHIPPED | OUTPATIENT
Start: 2019-04-05 | End: 2019-08-29

## 2019-04-05 RX ORDER — KETOROLAC TROMETHAMINE 30 MG/ML
15 INJECTION, SOLUTION INTRAMUSCULAR; INTRAVENOUS
Status: COMPLETED | OUTPATIENT
Start: 2019-04-05 | End: 2019-04-05

## 2019-04-05 RX ORDER — MORPHINE SULFATE 2 MG/ML
2 INJECTION, SOLUTION INTRAMUSCULAR; INTRAVENOUS
Status: COMPLETED | OUTPATIENT
Start: 2019-04-05 | End: 2019-04-05

## 2019-04-05 RX ORDER — CIPROFLOXACIN 500 MG/1
500 TABLET ORAL 2 TIMES DAILY
Qty: 14 TAB | Refills: 0 | Status: SHIPPED | OUTPATIENT
Start: 2019-04-05 | End: 2019-04-12

## 2019-04-05 RX ADMIN — KETOROLAC TROMETHAMINE 15 MG: 30 INJECTION, SOLUTION INTRAMUSCULAR at 16:42

## 2019-04-05 RX ADMIN — MORPHINE SULFATE 2 MG: 2 INJECTION, SOLUTION INTRAMUSCULAR; INTRAVENOUS at 16:42

## 2019-04-05 RX ADMIN — SODIUM CHLORIDE 1000 ML: 900 INJECTION, SOLUTION INTRAVENOUS at 16:42

## 2019-04-05 NOTE — LETTER
NOTIFICATION RETURN TO WORK / SCHOOL 
 
4/5/2019 6:27 PM 
 
Ms. Lesvia Garg 
[address] [facility] [address] [facility] [address] To Whom It May Concern: 
 
Lesvia Garg was under the care of [address] [facility] EMERGENCY DEPT. She will return to work/school on: 04/08/2019 If there are questions or concerns please have the patient contact our office. Sincerely, Justa Brantley PA-C,Britt Nelson RN

## 2019-04-05 NOTE — ED NOTES
I have reviewed discharge instructions with the patient and spouse. The patient and spouse verbalized understanding. Patient armband removed and shredded Current Discharge Medication List  
  
START taking these medications Details  
ciprofloxacin HCl (CIPRO) 500 mg tablet Take 1 Tab by mouth two (2) times a day for 7 days. Qty: 14 Tab, Refills: 0  
  
naproxen (NAPROSYN) 500 mg tablet Take 1 Tab by mouth two (2) times daily (with meals). Qty: 20 Tab, Refills: 0

## 2019-04-05 NOTE — DISCHARGE INSTRUCTIONS
[employer] Activation    Thank you for requesting access to [employer]. Please follow the instructions below to securely access and download your online medical record. [employer] allows you to send messages to your doctor, view your test results, renew your prescriptions, schedule appointments, and more. How Do I Sign Up? 1. In your internet browser, go to [email or web address]  2. Click on the First Time User? Click Here link in the Sign In box. You will be redirect to the New Member Sign Up page. 3. Enter your [employer] Access Code exactly as it appears below. You will not need to use this code after youve completed the sign-up process. If you do not sign up before the expiration date, you must request a new code. [employer] Access Code: [email or web address]  Expires: 2019  3:28 PM (This is the date your [employer] access code will )    4. Enter the last four digits of your Social Security Number (xxxx) and Date of Birth (mm/dd/yyyy) as indicated and click Submit. You will be taken to the next sign-up page. 5. Create a [employer] ID. This will be your [employer] login ID and cannot be changed, so think of one that is secure and easy to remember. 6. Create a [employer] password. You can change your password at any time. 7. Enter your Password Reset Question and Answer. This can be used at a later time if you forget your password. 8. Enter your e-mail address. You will receive e-mail notification when new information is available in [address]. 9. Click Sign Up. You can now view and download portions of your medical record. 10. Click the Download Summary menu link to download a portable copy of your medical information. Additional Information    If you have questions, please visit the Frequently Asked Questions section of the [employer] website at [email or web address] Remember, [employer] is NOT to be used for urgent needs. For medical emergencies, dial 911.          Complete all medications as prescribed. Follow-up with primary care doctor in 1 week. Return to the ED immediately for any new or worsening symptoms.

## 2019-04-05 NOTE — ED TRIAGE NOTES
Patient c/o right side flank pain off and on x 1 week, denies diarrhea, patient is nauseate today. Denies pain on urination or blood.

## 2019-04-05 NOTE — ED PROVIDER NOTES
EMERGENCY DEPARTMENT HISTORY AND PHYSICAL EXAM 
 
Date: 2019 Patient Name: Beto Oneil History of Presenting Illness Chief Complaint Patient presents with  Flank Pain History Provided By: patient Chief Complaint: flank pain Duration 1 week Timing:acute Location: R flank Dorthula Krzysztof Severity moderate Modifying Factors: none Associated Symptoms: back pain Additional History (Context): Beto Oneil is a 47 y.o. female with PMH asthma who presents with complaints of R sided flank and mid back pain x 1 week. Pt denies urinary or abdominal sx, but states \"my kidney hurts. \" Denies fever and chills, denies tx PTA. No other complaints. PCP: None Current Outpatient Medications Medication Sig Dispense Refill  albuterol (PROVENTIL HFA, VENTOLIN HFA, PROAIR HFA) 90 mcg/actuation inhaler Take  by inhalation. Past History Past Medical History: 
Past Medical History:  
Diagnosis Date  Asthma  Ill-defined condition   
 anemia Past Surgical History: 
Past Surgical History:  
Procedure Laterality Date  HX  SECTION Family History: 
History reviewed. No pertinent family history. Social History: 
Social History Tobacco Use  Smoking status: Never Smoker  Smokeless tobacco: Never Used Substance Use Topics  Alcohol use: No  
 Drug use: No  
 
 
Allergies: Allergies Allergen Reactions  Tetanus And Diphtheria Toxoids Hives and Nausea and Vomiting Review of Systems Review of Systems Constitutional: Negative. Negative for chills and fever. HENT: Negative. Negative for congestion, ear pain and rhinorrhea. Eyes: Negative. Negative for pain and redness. Respiratory: Negative. Negative for cough, shortness of breath, wheezing and stridor. Cardiovascular: Negative. Negative for chest pain and leg swelling. Gastrointestinal: Negative.   Negative for abdominal pain, constipation, diarrhea, nausea and vomiting. Genitourinary: Positive for flank pain. Negative for dysuria and frequency. Musculoskeletal: Positive for back pain. Negative for neck pain. Skin: Negative. Negative for rash and wound. Neurological: Negative. Negative for dizziness, seizures, syncope and headaches. All other systems reviewed and are negative. All Other Systems Negative Physical Exam  
 
Vitals:  
 04/05/19 1532 04/05/19 1709 BP: 113/75 Pulse: 70 Resp: 16 Temp: 98.1 °F (36.7 °C) SpO2: 98% Weight: (!) 181.4 kg (400 lb) (!) 182.3 kg (402 lb) Height: 5' 7\" (1.702 m) 5' 7\" (1.702 m) Physical Exam  
Constitutional: She is oriented to person, place, and time. She appears well-developed and well-nourished. No distress. Morbidly obese HENT:  
Head: Normocephalic and atraumatic. Eyes: Conjunctivae are normal. Right eye exhibits no discharge. Left eye exhibits no discharge. No scleral icterus. Neck: Normal range of motion. Neck supple. Cardiovascular: Normal rate, regular rhythm and normal heart sounds. Exam reveals no gallop and no friction rub. No murmur heard. Pulmonary/Chest: Effort normal and breath sounds normal. No stridor. No respiratory distress. She has no wheezes. She has no rales. R CVA TTP noted Abdominal: Soft. There is no tenderness. Musculoskeletal: Normal range of motion. She exhibits tenderness. She exhibits no edema or deformity. No midline TTP noted to the spine midline, TTP and hypertonicity noted to the R thoracic erector spinae muscles. Neurological: She is alert and oriented to person, place, and time. Coordination normal.  
Gait is steady. Able to ambulate without difficulty. Skin: Skin is warm and dry. No rash noted. She is not diaphoretic. No erythema. Psychiatric: She has a normal mood and affect. Her behavior is normal. Thought content normal.  
Nursing note and vitals reviewed. Diagnostic Study Results Labs - 
  
 Recent Results (from the past 12 hour(s)) URINALYSIS W/ RFLX MICROSCOPIC Collection Time: 04/05/19  3:48 PM  
Result Value Ref Range Color DARK YELLOW Appearance CLOUDY Specific gravity 1.030 1.005 - 1.030    
 pH (UA) 5.0 5.0 - 8.0 Protein NEGATIVE  NEG mg/dL Glucose NEGATIVE  NEG mg/dL Ketone NEGATIVE  NEG mg/dL Bilirubin NEGATIVE  NEG Blood SMALL (A) NEG Urobilinogen 1.0 0.2 - 1.0 EU/dL Nitrites NEGATIVE  NEG Leukocyte Esterase SMALL (A) NEG URINE MICROSCOPIC ONLY Collection Time: 04/05/19  3:48 PM  
Result Value Ref Range WBC 4 to 10 0 - 4 /hpf  
 RBC 4 to 10 0 - 5 /hpf Epithelial cells 3+ 0 - 5 /lpf Bacteria 3+ (A) NEG /hpf  
CBC WITH AUTOMATED DIFF Collection Time: 04/05/19  3:55 PM  
Result Value Ref Range WBC 4.0 (L) 4.6 - 13.2 K/uL  
 RBC 4.57 4.20 - 5.30 M/uL  
 HGB 11.7 (L) 12.0 - 16.0 g/dL HCT 37.7 35.0 - 45.0 % MCV 82.5 74.0 - 97.0 FL  
 MCH 25.6 24.0 - 34.0 PG  
 MCHC 31.0 31.0 - 37.0 g/dL  
 RDW 15.9 (H) 11.6 - 14.5 % PLATELET 926 770 - 046 K/uL MPV 9.5 9.2 - 11.8 FL  
 NEUTROPHILS 49 40 - 73 % LYMPHOCYTES 39 21 - 52 % MONOCYTES 9 3 - 10 % EOSINOPHILS 2 0 - 5 % BASOPHILS 1 0 - 2 %  
 ABS. NEUTROPHILS 2.0 1.8 - 8.0 K/UL  
 ABS. LYMPHOCYTES 1.6 0.9 - 3.6 K/UL  
 ABS. MONOCYTES 0.3 0.05 - 1.2 K/UL  
 ABS. EOSINOPHILS 0.1 0.0 - 0.4 K/UL  
 ABS. BASOPHILS 0.0 0.0 - 0.1 K/UL  
 DF AUTOMATED METABOLIC PANEL, COMPREHENSIVE Collection Time: 04/05/19  3:55 PM  
Result Value Ref Range Sodium 141 136 - 145 mmol/L Potassium 3.7 3.5 - 5.5 mmol/L Chloride 105 100 - 108 mmol/L  
 CO2 29 21 - 32 mmol/L Anion gap 7 3.0 - 18 mmol/L Glucose 81 74 - 99 mg/dL BUN 18 7.0 - 18 MG/DL Creatinine 0.77 0.6 - 1.3 MG/DL  
 BUN/Creatinine ratio 23 (H) 12 - 20 GFR est AA >60 >60 ml/min/1.73m2 GFR est non-AA >60 >60 ml/min/1.73m2 Calcium 8.9 8.5 - 10.1 MG/DL  Bilirubin, total 0.4 0.2 - 1.0 MG/DL  
 ALT (SGPT) 15 13 - 56 U/L  
 AST (SGOT) 14 (L) 15 - 37 U/L Alk. phosphatase 55 45 - 117 U/L Protein, total 7.8 6.4 - 8.2 g/dL Albumin 3.7 3.4 - 5.0 g/dL Globulin 4.1 (H) 2.0 - 4.0 g/dL A-G Ratio 0.9 0.8 - 1.7 LIPASE Collection Time: 04/05/19  3:55 PM  
Result Value Ref Range Lipase 117 73 - 393 U/L Radiologic Studies -  
CT ABD PELV WO CONT Final Result IMPRESSION:  
  
1. No nephrolithiasis or hydronephrosis. 2.   Lumbar facet hypertrophy, degenerative disc disease, degenerative changes SI joints and bilateral hips. 3.   Suspect a serpiginous right femoral vein and less likely an enlarged  
inguinal lymph node. 4.  Fat-containing umbilical hernia. CT Results  (Last 48 hours) 04/05/19 1713  CT ABD PELV WO CONT Final result Impression:  IMPRESSION:  
   
1. No nephrolithiasis or hydronephrosis. 2.   Lumbar facet hypertrophy, degenerative disc disease, degenerative changes SI joints and bilateral hips. 3.   Suspect a serpiginous right femoral vein and less likely an enlarged  
inguinal lymph node. 4.  Fat-containing umbilical hernia. Narrative:  CT ABDOMEN AND PELVIS WITHOUT ENHANCEMENT INDICATION: Right-sided flank and mid back pain for one week attributable to  
kidney by patient. TECHNIQUE: Axial images obtained of the abdomen and pelvis without intravenous  
contrast. Coronal and sagittal reformatted images of the abdomen and pelvis were  
obtained. All CT scans at this facility are performed using dose optimization technique as  
appropriate to a performed exam, to include automated exposure control,  
adjustment of the mA and/or kV according to patient size (including appropriate  
matching first site-specific examinations), or use of iterative reconstruction  
technique. COMPARISON: None.   
   
Lack of intravenous contrast renders this study suboptimal for evaluating the  
 solid abdominal organs, vasculature and bowel. ABDOMEN FINDINGS:   
   
Liver: Mild heterogeneity. Spleen: Unremarkable. Pancreas: Unremarkable. Biliary: Unremarkable. Bowel: Unremarkable. Peritoneum/ Retroperitoneum: Unremarkable. Lymph Nodes: Incompletely visualized 1.9 cm right femoral serpiginous vein  
versus inguinal lymph node. Adrenal Glands: Unremarkable. Kidneys: Unremarkable. Vessels: Unremarkable for age. PELVIS FINDINGS:   
   
Bladder/ Pelvic Organs: Unremarkable. Lung Base: Unremarkable. Bones/Soft tissues: Fat-containing umbilical hernia. Obesity. Lumbar facet  
hypertrophy. Bilateral SI joint degenerative changes. Degenerative changes  
bilateral hips. Multifocal degenerative disc disease. CXR Results  (Last 48 hours) None Medical Decision Making I am the first provider for this patient. I reviewed the vital signs, available nursing notes, past medical history, past surgical history, family history and social history. Vital Signs-Reviewed the patient's vital signs. Records Reviewed: Chandni Arrington PA-C Procedures: 
Procedures Provider Notes (Medical Decision Making): Impression:  Flank pain IV fluids, morphine, and Toradol given, symptoms improving. Labs essentially unremarkable UA shows hematuria and bacteria, urine sent for culture, CT negative for kidney stone. We will plan to discharge patient with antibiotics to cover UTI and recommend close PCP follow-up in 1 week. Patient agrees with this plan. MED RECONCILIATION: 
No current facility-administered medications for this encounter. Current Outpatient Medications Medication Sig  
 albuterol (PROVENTIL HFA, VENTOLIN HFA, PROAIR HFA) 90 mcg/actuation inhaler Take  by inhalation. Disposition: D/c 
 
DISCHARGE NOTE:  
Patient is stable for discharge at this time.  Rx for cipro and naproxen given. Rest and follow-up with PCP this week. Return to the ED immediately for any new or worsening sx. Chandni Arrington PA-C Follow-up Information Follow up With Specialties Details Why Contact [facility] Schedule an appointment as soon as possible for a visit in 1 week  [address] [place] [address] 
[phone] [address] [facility] EMERGENCY DEPT Emergency Medicine  As needed, If symptoms worsen Nehemiah [place]cais[place] [id number] [phone] Diagnosis Clinical Impression: 1. Urinary tract infection with hematuria, site unspecified 2. Flank pain

## 2019-04-07 LAB
BACTERIA SPEC CULT: NORMAL
SERVICE CMNT-IMP: NORMAL

## 2019-08-29 ENCOUNTER — APPOINTMENT (OUTPATIENT)
Dept: CT IMAGING | Age: 54
End: 2019-08-29
Attending: PHYSICIAN ASSISTANT
Payer: SELF-PAY

## 2019-08-29 ENCOUNTER — APPOINTMENT (OUTPATIENT)
Dept: GENERAL RADIOLOGY | Age: 54
End: 2019-08-29
Attending: PHYSICIAN ASSISTANT
Payer: SELF-PAY

## 2019-08-29 ENCOUNTER — HOSPITAL ENCOUNTER (EMERGENCY)
Age: 54
Discharge: HOME | End: 2019-08-29
Attending: EMERGENCY MEDICINE
Payer: SELF-PAY

## 2019-08-29 VITALS
SYSTOLIC BLOOD PRESSURE: 141 MMHG | RESPIRATION RATE: 16 BRPM | WEIGHT: 293 LBS | HEIGHT: 67 IN | OXYGEN SATURATION: 97 % | BODY MASS INDEX: 45.99 KG/M2 | HEART RATE: 83 BPM | DIASTOLIC BLOOD PRESSURE: 85 MMHG | TEMPERATURE: 98.7 F

## 2019-08-29 DIAGNOSIS — J45.909 ASTHMA, UNSPECIFIED ASTHMA SEVERITY, UNSPECIFIED WHETHER COMPLICATED, UNSPECIFIED WHETHER PERSISTENT: Primary | ICD-10-CM

## 2019-08-29 DIAGNOSIS — J20.8 VIRAL BRONCHITIS: ICD-10-CM

## 2019-08-29 DIAGNOSIS — R05.9 COUGH: ICD-10-CM

## 2019-08-29 LAB
ALBUMIN SERPL-MCNC: 3.7 G/DL (ref 3.4–5)
ALBUMIN/GLOB SERPL: 0.9 {RATIO} (ref 0.8–1.7)
ALP SERPL-CCNC: 62 U/L (ref 45–117)
ALT SERPL-CCNC: 17 U/L (ref 13–56)
ANION GAP SERPL CALC-SCNC: 8 MMOL/L (ref 3–18)
AST SERPL-CCNC: 19 U/L (ref 10–38)
BASOPHILS # BLD: 0 K/UL (ref 0–0.1)
BASOPHILS NFR BLD: 1 % (ref 0–2)
BILIRUB SERPL-MCNC: 0.5 MG/DL (ref 0.2–1)
BUN SERPL-MCNC: 18 MG/DL (ref 7–18)
BUN/CREAT SERPL: 25 (ref 12–20)
CALCIUM SERPL-MCNC: 9 MG/DL (ref 8.5–10.1)
CHLORIDE SERPL-SCNC: 107 MMOL/L (ref 100–111)
CO2 SERPL-SCNC: 29 MMOL/L (ref 21–32)
CREAT SERPL-MCNC: 0.71 MG/DL (ref 0.6–1.3)
D DIMER PPP FEU-MCNC: 1.3 UG/ML(FEU)
DIFFERENTIAL METHOD BLD: ABNORMAL
EOSINOPHIL # BLD: 0 K/UL (ref 0–0.4)
EOSINOPHIL NFR BLD: 0 % (ref 0–5)
ERYTHROCYTE [DISTWIDTH] IN BLOOD BY AUTOMATED COUNT: 15.5 % (ref 11.6–14.5)
GLOBULIN SER CALC-MCNC: 4 G/DL (ref 2–4)
GLUCOSE SERPL-MCNC: 93 MG/DL (ref 74–99)
HCT VFR BLD AUTO: 36.1 % (ref 35–45)
HGB BLD-MCNC: 11.1 G/DL (ref 12–16)
LYMPHOCYTES # BLD: 0.9 K/UL (ref 0.9–3.6)
LYMPHOCYTES NFR BLD: 22 % (ref 21–52)
MCH RBC QN AUTO: 26.2 PG (ref 24–34)
MCHC RBC AUTO-ENTMCNC: 30.7 G/DL (ref 31–37)
MCV RBC AUTO: 85.3 FL (ref 74–97)
MONOCYTES # BLD: 0.2 K/UL (ref 0.05–1.2)
MONOCYTES NFR BLD: 5 % (ref 3–10)
NEUTS SEG # BLD: 2.9 K/UL (ref 1.8–8)
NEUTS SEG NFR BLD: 72 % (ref 40–73)
PLATELET # BLD AUTO: 259 K/UL (ref 135–420)
PMV BLD AUTO: 9.3 FL (ref 9.2–11.8)
POTASSIUM SERPL-SCNC: 4.3 MMOL/L (ref 3.5–5.5)
PROT SERPL-MCNC: 7.7 G/DL (ref 6.4–8.2)
RBC # BLD AUTO: 4.23 M/UL (ref 4.2–5.3)
SODIUM SERPL-SCNC: 144 MMOL/L (ref 136–145)
WBC # BLD AUTO: 4 K/UL (ref 4.6–13.2)

## 2019-08-29 PROCEDURE — 74011000250 HC RX REV CODE- 250: Performed by: PHYSICIAN ASSISTANT

## 2019-08-29 PROCEDURE — 80053 COMPREHEN METABOLIC PANEL: CPT

## 2019-08-29 PROCEDURE — 71046 X-RAY EXAM CHEST 2 VIEWS: CPT

## 2019-08-29 PROCEDURE — 74011636637 HC RX REV CODE- 636/637: Performed by: PHYSICIAN ASSISTANT

## 2019-08-29 PROCEDURE — 85025 COMPLETE CBC W/AUTO DIFF WBC: CPT

## 2019-08-29 PROCEDURE — 94640 AIRWAY INHALATION TREATMENT: CPT

## 2019-08-29 PROCEDURE — 74011636320 HC RX REV CODE- 636/320: Performed by: EMERGENCY MEDICINE

## 2019-08-29 PROCEDURE — 85379 FIBRIN DEGRADATION QUANT: CPT

## 2019-08-29 PROCEDURE — 99284 EMERGENCY DEPT VISIT MOD MDM: CPT

## 2019-08-29 PROCEDURE — 71275 CT ANGIOGRAPHY CHEST: CPT

## 2019-08-29 RX ORDER — IPRATROPIUM BROMIDE AND ALBUTEROL SULFATE 2.5; .5 MG/3ML; MG/3ML
3 SOLUTION RESPIRATORY (INHALATION)
Qty: 30 NEBULE | Refills: 0 | Status: SHIPPED | OUTPATIENT
Start: 2019-08-29 | End: 2020-08-08

## 2019-08-29 RX ORDER — PREDNISONE 20 MG/1
60 TABLET ORAL
Status: COMPLETED | OUTPATIENT
Start: 2019-08-29 | End: 2019-08-29

## 2019-08-29 RX ORDER — PREDNISONE 10 MG/1
TABLET ORAL
Qty: 21 TAB | Refills: 0 | Status: SHIPPED | OUTPATIENT
Start: 2019-08-29 | End: 2020-08-08

## 2019-08-29 RX ORDER — PREDNISONE 20 MG/1
20 TABLET ORAL DAILY
COMMUNITY
Start: 2019-08-28 | End: 2019-08-29

## 2019-08-29 RX ORDER — IPRATROPIUM BROMIDE AND ALBUTEROL SULFATE 2.5; .5 MG/3ML; MG/3ML
3 SOLUTION RESPIRATORY (INHALATION)
Status: COMPLETED | OUTPATIENT
Start: 2019-08-29 | End: 2019-08-29

## 2019-08-29 RX ADMIN — PREDNISONE 60 MG: 20 TABLET ORAL at 19:20

## 2019-08-29 RX ADMIN — IPRATROPIUM BROMIDE AND ALBUTEROL SULFATE 3 ML: .5; 3 SOLUTION RESPIRATORY (INHALATION) at 19:21

## 2019-08-29 RX ADMIN — IOPAMIDOL 100 ML: 755 INJECTION, SOLUTION INTRAVENOUS at 18:15

## 2019-08-29 NOTE — DISCHARGE INSTRUCTIONS
[employer] Activation    Thank you for requesting access to [employer]. Please follow the instructions below to securely access and download your online medical record. [employer] allows you to send messages to your doctor, view your test results, renew your prescriptions, schedule appointments, and more. How Do I Sign Up? 1. In your internet browser, go to [email or web address]  2. Click on the First Time User? Click Here link in the Sign In box. You will be redirect to the New Member Sign Up page. 3. Enter your [employer] Access Code exactly as it appears below. You will not need to use this code after youve completed the sign-up process. If you do not sign up before the expiration date, you must request a new code. [employer] Access Code: [email or web address]  Expires: 10/13/2019  3:47 PM (This is the date your [employer] access code will )    4. Enter the last four digits of your Social Security Number (xxxx) and Date of Birth (mm/dd/yyyy) as indicated and click Submit. You will be taken to the next sign-up page. 5. Create a Sat[employer]ier ID. This will be your [employer] login ID and cannot be changed, so think of one that is secure and easy to remember. 6. Create a [employer]ier password. You can change your password at any time. 7. Enter your Password Reset Question and Answer. This can be used at a later time if you forget your password. 8. Enter your e-mail address. You will receive e-mail notification when new information is available in [address]. 9. Click Sign Up. You can now view and download portions of your medical record. 10. Click the Download Summary menu link to download a portable copy of your medical information. Additional Information    If you have questions, please visit the Frequently Asked Questions section of the [employer] website at [email or web address] Remember, [employer] is NOT to be used for urgent needs. For medical emergencies, dial 911.        Complete all medications as prescribed. Follow-up with primary care doctor in 1 week. Return to the ED immediately for any new or worsening symptoms.

## 2019-08-29 NOTE — ED PROVIDER NOTES
EMERGENCY DEPARTMENT HISTORY AND PHYSICAL EXAM    Date: 2019  Patient Name: Pili Reardon    History of Presenting Illness     Chief Complaint   Patient presents with    Cough         History Provided By:patient     Chief Complaint: cough  Duration: 3 weeks  Timing: acute  Location: chest  Quality: productive  Severity:moderate  Modifying Factors: abx and low dose prednisone have not helped  Associated Symptoms: tightness with coughing, wheezing, intermittent leg swelling       Additional History (Context): Pili Reardon is a 47 y.o. female with PMH asthma  who presents with c/o a productive cough, chest tightness with coughing, wheezing, and intermittent leg swelling x 3 weeks. Pt states she has completed a zpack and is currently taking a low dose prednisone with no relief in her sx. She states she has used her inhaler with no relief as well. No other complaints. PCP: None    Current Facility-Administered Medications   Medication Dose Route Frequency Provider Last Rate Last Dose    albuterol-ipratropium (DUO-NEB) 2.5 MG-0.5 MG/3 ML  3 mL Nebulization NOW Chandni Arrington PA-C        predniSONE (DELTASONE) tablet 60 mg  60 mg Oral NOW Chandni Arrington PA-C         Current Outpatient Medications   Medication Sig Dispense Refill    Ciclesonide (ALVESCO) 80 mcg/actuation HFAA Take  by inhalation.  albuterol-ipratropium (DUO-NEB) 2.5 mg-0.5 mg/3 ml nebu 3 mL by Nebulization route every four (4) hours as needed (wheezing and cough). 30 Nebule 0    predniSONE (STERAPRED DS) 10 mg dose pack Use as directed, start on 19. 21 Tab 0    albuterol (PROVENTIL HFA, VENTOLIN HFA, PROAIR HFA) 90 mcg/actuation inhaler Take  by inhalation.          Past History     Past Medical History:  Past Medical History:   Diagnosis Date    Asthma     Ill-defined condition     anemia       Past Surgical History:  Past Surgical History:   Procedure Laterality Date    HX  SECTION         Family History:  History reviewed. No pertinent family history. Social History:  Social History     Tobacco Use    Smoking status: Never Smoker    Smokeless tobacco: Never Used   Substance Use Topics    Alcohol use: No    Drug use: No       Allergies: Allergies   Allergen Reactions    Tetanus And Diphtheria Toxoids Hives and Nausea and Vomiting         Review of Systems   Review of Systems   Constitutional: Negative. Negative for chills and fever. HENT: Negative. Negative for congestion, ear pain and rhinorrhea. Eyes: Negative. Negative for pain and redness. Respiratory: Positive for cough, chest tightness and wheezing. Negative for shortness of breath and stridor. Cardiovascular: Positive for leg swelling. Negative for chest pain. Gastrointestinal: Negative. Negative for abdominal pain, constipation, diarrhea, nausea and vomiting. Genitourinary: Negative. Negative for dysuria and frequency. Musculoskeletal: Negative. Negative for back pain and neck pain. Skin: Negative. Negative for rash and wound. Neurological: Negative. Negative for dizziness, seizures, syncope and headaches. All other systems reviewed and are negative. All Other Systems Negative  Physical Exam     Vitals:    08/29/19 1557   BP: 137/88   Pulse: 86   Resp: 18   Temp: 98.5 °F (36.9 °C)   SpO2: 96%   Weight: (!) 181.4 kg (400 lb)   Height: 5' 7\" (1.702 m)     Physical Exam   Constitutional: She is oriented to person, place, and time. She appears well-developed and well-nourished. No distress. Morbidly obese   HENT:   Head: Normocephalic and atraumatic. Eyes: Conjunctivae are normal. Right eye exhibits no discharge. Left eye exhibits no discharge. No scleral icterus. Neck: Normal range of motion. Neck supple. Cardiovascular: Normal rate, regular rhythm and normal heart sounds. Exam reveals no gallop and no friction rub. No murmur heard. Pulmonary/Chest: Effort normal and breath sounds normal. No stridor. No respiratory distress. She has no wheezes. She has no rales. Coughing during the exam   Musculoskeletal: Normal range of motion. Pt is morbidly obese, no LE edema appreciated. Neurological: She is alert and oriented to person, place, and time. Coordination normal.   Gait is steady. Able to ambulate without difficulty. Skin: Skin is warm and dry. No rash noted. She is not diaphoretic. No erythema. Psychiatric: She has a normal mood and affect. Her behavior is normal. Thought content normal.   Nursing note and vitals reviewed. Diagnostic Study Results     Labs -     Recent Results (from the past 12 hour(s))   CBC WITH AUTOMATED DIFF    Collection Time: 08/29/19  4:54 PM   Result Value Ref Range    WBC 4.0 (L) 4.6 - 13.2 K/uL    RBC 4.23 4.20 - 5.30 M/uL    HGB 11.1 (L) 12.0 - 16.0 g/dL    HCT 36.1 35.0 - 45.0 %    MCV 85.3 74.0 - 97.0 FL    MCH 26.2 24.0 - 34.0 PG    MCHC 30.7 (L) 31.0 - 37.0 g/dL    RDW 15.5 (H) 11.6 - 14.5 %    PLATELET 454 161 - 907 K/uL    MPV 9.3 9.2 - 11.8 FL    NEUTROPHILS 72 40 - 73 %    LYMPHOCYTES 22 21 - 52 %    MONOCYTES 5 3 - 10 %    EOSINOPHILS 0 0 - 5 %    BASOPHILS 1 0 - 2 %    ABS. NEUTROPHILS 2.9 1.8 - 8.0 K/UL    ABS. LYMPHOCYTES 0.9 0.9 - 3.6 K/UL    ABS. MONOCYTES 0.2 0.05 - 1.2 K/UL    ABS. EOSINOPHILS 0.0 0.0 - 0.4 K/UL    ABS.  BASOPHILS 0.0 0.0 - 0.1 K/UL    DF AUTOMATED     METABOLIC PANEL, COMPREHENSIVE    Collection Time: 08/29/19  4:54 PM   Result Value Ref Range    Sodium 144 136 - 145 mmol/L    Potassium 4.3 3.5 - 5.5 mmol/L    Chloride 107 100 - 111 mmol/L    CO2 29 21 - 32 mmol/L    Anion gap 8 3.0 - 18 mmol/L    Glucose 93 74 - 99 mg/dL    BUN 18 7.0 - 18 MG/DL    Creatinine 0.71 0.6 - 1.3 MG/DL    BUN/Creatinine ratio 25 (H) 12 - 20      GFR est AA >60 >60 ml/min/1.73m2    GFR est non-AA >60 >60 ml/min/1.73m2    Calcium 9.0 8.5 - 10.1 MG/DL    Bilirubin, total 0.5 0.2 - 1.0 MG/DL    ALT (SGPT) 17 13 - 56 U/L    AST (SGOT) 19 10 - 38 U/L Alk. phosphatase 62 45 - 117 U/L    Protein, total 7.7 6.4 - 8.2 g/dL    Albumin 3.7 3.4 - 5.0 g/dL    Globulin 4.0 2.0 - 4.0 g/dL    A-G Ratio 0.9 0.8 - 1.7     D DIMER    Collection Time: 08/29/19  4:54 PM   Result Value Ref Range    D DIMER 1.30 (H) <0.46 ug/ml(FEU)       Radiologic Studies -   CTA CHEST W OR W WO CONT   Final Result   IMPRESSION:      No pulmonary embolism. Study mildly motion degraded. Mosaic attenuation in the lungs compatible with air trapping and together with   mild peribronchial thickening  might be seen with asthma and small airways   disease. XR CHEST PA LAT   Final Result   IMPRESSION:      Stable chest with no acute process. CT Results  (Last 48 hours)               08/29/19 1814  CTA CHEST W OR W WO CONT Final result    Impression:  IMPRESSION:       No pulmonary embolism. Study mildly motion degraded. Mosaic attenuation in the lungs compatible with air trapping and together with   mild peribronchial thickening  might be seen with asthma and small airways   disease. Narrative:  CT Of The Chest With Contrast.       CPT CODE: 83496       CLINICAL HISTORY: Shortness of breath with elevated d-dimer. Coughing for 3   weeks. History of asthma and says her inhaler not working. .       TECHNIQUE: After administration of nonionic intravenous contrast 100 cc   Isovue-370, 2.12 mm MDCT helical scan was obtained at the chest.  Coronal and   sagittal MIP images created to better evaluate the segmental and subsegmental   arterial anatomy. All CT scans at this facility are performed using dose   optimization techniques as appropriate to a performed exam, to include automated   exposure control, adjustment of the mA and/or kV according to patient's size   (including appropriate matching for site specific examinations), or use of   iterative reconstruction technique. COMPARISON: Chest x-ray today. FINDINGS:        Lungs: Mosaic attenuation.  No areas of dense consolidation. Airways patent. Mild   peribronchial thickening. Pleural space: No effusions. Heart and pericardium: Normal.       Great vessels: Aorta and arch vessels normally enhancing. Pulmonary arteries   normally enhancing. Study mildly motion degraded. Lymph nodes: No adenopathy. Base of neck: Normal.       Upper abdomen: Normal.. MIP images: [These demonstrate normal enhancement pulmonary arteries. No filling   defects to suggest embolism. Study mildly motion degraded.]               CXR Results  (Last 48 hours)               08/29/19 1610  XR CHEST PA LAT Final result    Impression:  IMPRESSION:       Stable chest with no acute process. Narrative:  EXAM:  XR CHEST PA LAT       INDICATION:   cough       COMPARISON: 2/16/2018       FINDINGS:        Underinflated lungs with no parenchymal consolidation. No pneumothorax or   pleural effusion. Normal heart size. Degenerative changes of the thoracic spine. Medical Decision Making   I am the first provider for this patient. I reviewed the vital signs, available nursing notes, past medical history, past surgical history, family history and social history. Vital Signs-Reviewed the patient's vital signs. Records Reviewed: Chandni Arrington PA-C     Procedures:  Procedures    Provider Notes (Medical Decision Making): Impression:  Viral bronchitis, cough    Chest x-ray negative, given the pt's account of leg swelling, will plan to obtain a d dimer level. Chandni Arrington PA-C     7:11 PM d dimer was elevated,   CTA obtained and negative for PE, just shows asthmatic changes. Pt currently taking a low dose 20 mg prednisone, will plan to d/c this med and start a prednisone taper. Will also give pt rx for duo neb to be used at home as needed. pcp follow-up in 1 week. Pt agrees with this plan.  Chandni Arrington PA-C     MED RECONCILIATION:  Current Facility-Administered Medications   Medication Dose Route Frequency    albuterol-ipratropium (DUO-NEB) 2.5 MG-0.5 MG/3 ML  3 mL Nebulization NOW    predniSONE (DELTASONE) tablet 60 mg  60 mg Oral NOW     Current Outpatient Medications   Medication Sig    Ciclesonide (ALVESCO) 80 mcg/actuation HFAA Take  by inhalation.  albuterol-ipratropium (DUO-NEB) 2.5 mg-0.5 mg/3 ml nebu 3 mL by Nebulization route every four (4) hours as needed (wheezing and cough).  predniSONE (STERAPRED DS) 10 mg dose pack Use as directed, start on 8/30/19.  albuterol (PROVENTIL HFA, VENTOLIN HFA, PROAIR HFA) 90 mcg/actuation inhaler Take  by inhalation. Disposition:  D.c    DISCHARGE NOTE:   Patient is stable for discharge at this time. Rx for duo neb and prednisone given. Rest and follow-up with PCP this week. Return to the ED immediately for any new or worsening sx. Chandni Arrington PA-C 7:12 PM   Follow-up Information    None         Current Discharge Medication List      START taking these medications    Details   albuterol-ipratropium (DUO-NEB) 2.5 mg-0.5 mg/3 ml nebu 3 mL by Nebulization route every four (4) hours as needed (wheezing and cough). Qty: 30 Nebule, Refills: 0      predniSONE (STERAPRED DS) 10 mg dose pack Use as directed, start on 8/30/19. Qty: 21 Tab, Refills: 0         CONTINUE these medications which have NOT CHANGED    Details   albuterol (PROVENTIL HFA, VENTOLIN HFA, PROAIR HFA) 90 mcg/actuation inhaler Take  by inhalation. STOP taking these medications       predniSONE (DELTASONE) 20 mg tablet Comments:   Reason for Stopping:                     Diagnosis     Clinical Impression:   1. Asthma, unspecified asthma severity, unspecified whether complicated, unspecified whether persistent    2. Cough    3.  Viral bronchitis

## 2019-08-29 NOTE — LETTER
[facility] EMERGENCY DEPT 
[address] [facility] [address] [phone] Work/School Note Date: 8/29/2019 To Whom It May concern: 
 
Chavez Granda was seen and treated today in the emergency room by the following provider(s): 
Attending Provider: Yamilet Mills MD 
Physician Assistant: Arley Lange PA-C. Chavez Granda may return to work on September 1st 2019. Sincerely, Earl Dolan RN

## 2019-08-29 NOTE — ED TRIAGE NOTES
Pt presents with cough x 3 weeks. Per pt placed on z-pac for 8 days which she has completed and now on prednisone which was started yesterday. Pt states hx of asthma, states inhalers not working.

## 2020-08-08 ENCOUNTER — HOSPITAL ENCOUNTER (EMERGENCY)
Age: 55
Discharge: HOME | End: 2020-08-08
Payer: COMMERCIAL

## 2020-08-08 VITALS
OXYGEN SATURATION: 99 % | DIASTOLIC BLOOD PRESSURE: 57 MMHG | TEMPERATURE: 98 F | RESPIRATION RATE: 22 BRPM | HEIGHT: 67 IN | SYSTOLIC BLOOD PRESSURE: 158 MMHG | WEIGHT: 293 LBS | BODY MASS INDEX: 45.99 KG/M2 | HEART RATE: 80 BPM

## 2020-08-08 DIAGNOSIS — G89.29 CHRONIC LEFT SHOULDER PAIN: Primary | ICD-10-CM

## 2020-08-08 DIAGNOSIS — M25.512 CHRONIC LEFT SHOULDER PAIN: Primary | ICD-10-CM

## 2020-08-08 DIAGNOSIS — M54.12 CERVICAL RADICULOPATHY: ICD-10-CM

## 2020-08-08 PROCEDURE — 99282 EMERGENCY DEPT VISIT SF MDM: CPT

## 2020-08-08 RX ORDER — IBUPROFEN 800 MG/1
TABLET ORAL
Qty: 15 TAB | Refills: 0 | Status: SHIPPED | OUTPATIENT
Start: 2020-08-08 | End: 2021-04-26

## 2020-08-08 NOTE — ED PROVIDER NOTES
HPI patient complains of pain in her left shoulder and left upper arm for the past 3 months. She says she has a past history of \"a pinched nerve\" in the left shoulder and arm and that it bothered her several years ago. She says that she took some pain medicine and give it some rest and the symptoms went away. She says her symptoms have returned for the past several months and today she was tired of having the pain she decided to come the emergency room for evaluation. She denies any history of fall or trauma to the arm or shoulder. She says symptoms are worse when she rotates or moves her left shoulder tries elevated above the midline. She denies any swelling in her shoulder. Additionally, she complains of chronic lymphedema in her legs bilaterally with the left leg usually worse than the right. She says she is been using a lymphedema pump but that is her 's but this is not been providing much relief. No other complaints given at this time. Past Medical History:   Diagnosis Date    Asthma     Ill-defined condition     anemia       Past Surgical History:   Procedure Laterality Date    HX  SECTION           History reviewed. No pertinent family history.     Social History     Socioeconomic History    Marital status:      Spouse name: Not on file    Number of children: Not on file    Years of education: Not on file    Highest education level: Not on file   Occupational History    Not on file   Social Needs    Financial resource strain: Not on file    Food insecurity     Worry: Not on file     Inability: Not on file    Transportation needs     Medical: Not on file     Non-medical: Not on file   Tobacco Use    Smoking status: Never Smoker    Smokeless tobacco: Never Used   Substance and Sexual Activity    Alcohol use: No    Drug use: No    Sexual activity: Not on file   Lifestyle    Physical activity     Days per week: Not on file     Minutes per session: Not on file    Stress: Not on file   Relationships    Social connections     Talks on phone: Not on file     Gets together: Not on file     Attends [employer] service: Not on file     Active member of club or organization: Not on file     Attends meetings of clubs or organizations: Not on file     Relationship status: Not on file    Intimate partner violence     Fear of current or ex partner: Not on file     Emotionally abused: Not on file     Physically abused: Not on file     Forced sexual activity: Not on file   Other Topics Concern    Not on file   Social History Narrative    Not on file         ALLERGIES: Tetanus and diphtheria toxoids    Review of Systems   Constitutional: Negative. HENT: Negative. Eyes: Negative. Respiratory: Negative. Cardiovascular: Negative. Genitourinary: Negative. Musculoskeletal: Positive for arthralgias. Neurological: Negative. Vitals:    08/08/20 0949   BP: 158/57   Pulse: 80   Resp: 22   Temp: 98 °F (36.7 °C)   SpO2: 99%   Weight: 156.5 kg (345 lb)   Height: 5' 7\" (1.702 m)            Physical Exam  Vitals signs and nursing note reviewed. Constitutional:       Appearance: She is well-developed. She is obese. HENT:      Head: Normocephalic and atraumatic. Nose: Nose normal.   Eyes:      Conjunctiva/sclera: Conjunctivae normal.      Pupils: Pupils are equal, round, and reactive to light. Neck:      Musculoskeletal: Normal range of motion and neck supple. Cardiovascular:      Rate and Rhythm: Normal rate and regular rhythm. Pulmonary:      Effort: Pulmonary effort is normal.      Breath sounds: Normal breath sounds. Abdominal:      Palpations: Abdomen is soft. Comments: Patient is morbidly obese   Musculoskeletal: Normal range of motion. Right lower leg: Edema present. Left lower leg: Edema present. Comments:  There is mild tenderness to palpation in the left shoulder along the anterior joint line consistent with a possible rotator cuff injury. No joint effusion. No swelling or skin changes. There is chronic lymphedema bilaterally in the legs. Patient states that today's edema is no worse or no better than that of her baseline   Skin:     General: Skin is warm and dry. Neurological:      Mental Status: She is alert and oriented to person, place, and time. MDM       Procedures    Discussed with the patient that her symptoms are most consistent with pinched nerve or possible rotator cuff injury. Stated that we will give her a referral for orthopedic follow-up as well as prescription for some pain control. Patient was satisfied with this disposition and says she needs a work note for couple days as well. She understands and agrees with the disposition and follow-up plan.   Enzo Sousa MD 10:07 AM

## 2020-08-08 NOTE — DISCHARGE INSTRUCTIONS

## 2020-08-08 NOTE — ED TRIAGE NOTES
Patient c/o left shoulder pain that radiates down left arm intermittently x 2 months. Denies injury, fall or heavy lifting. Patient c/o leg swelling x 3 months.

## 2020-08-08 NOTE — LETTER
[address] [place] [address] [facility] EMERGENCY DEPT 
[address] [facility] [address] [phone] Work/School Note Date: 8/8/2020 To Whom It May concern: 
 
Joanie Sorto was seen and treated today in the emergency room by the following provider(s): 
No providers found. Joanie Sorto may return to work on August 10, 2020.  
 
Sincerely, 
 
 
 
 
Shiva Graff MD

## 2020-08-14 ENCOUNTER — OFFICE VISIT (OUTPATIENT)
Dept: ORTHOPEDIC SURGERY | Age: 55
End: 2020-08-14

## 2020-08-14 VITALS
BODY MASS INDEX: 45.99 KG/M2 | SYSTOLIC BLOOD PRESSURE: 158 MMHG | OXYGEN SATURATION: 95 % | HEART RATE: 70 BPM | WEIGHT: 293 LBS | DIASTOLIC BLOOD PRESSURE: 80 MMHG | RESPIRATION RATE: 16 BRPM | HEIGHT: 67 IN | TEMPERATURE: 97.1 F

## 2020-08-14 DIAGNOSIS — M75.102 ROTATOR CUFF SYNDROME, LEFT: ICD-10-CM

## 2020-08-14 DIAGNOSIS — E66.01 OBESITY, MORBID (HCC): ICD-10-CM

## 2020-08-14 DIAGNOSIS — R20.0 NUMBNESS AND TINGLING IN LEFT ARM: Primary | ICD-10-CM

## 2020-08-14 DIAGNOSIS — R20.2 NUMBNESS AND TINGLING IN LEFT ARM: Primary | ICD-10-CM

## 2020-08-14 RX ORDER — METHYLPREDNISOLONE 4 MG/1
TABLET ORAL
Qty: 1 DOSE PACK | Refills: 0 | Status: SHIPPED | OUTPATIENT
Start: 2020-08-14 | End: 2020-10-05 | Stop reason: ALTCHOICE

## 2020-08-14 NOTE — PROGRESS NOTES
Patient: Sowmya Lopez                MRN: [id number]       SSN: [id number]  YOB: 1965        AGE: 54 y.o. SEX: female  Body mass index is 67.82 kg/m². PCP: No primary care provider on file. 08/14/20    CHIEF COMPLAINT: Neck, left arm and shoulder pain    HPI: Sowmya Lopez is a 54 y.o. female patient who presents to the office today with left neck arm and shoulder pain. She is been having the pain for about 2 months. She had a similar incident 2 years ago. This resolved on its own. However this is not resolving at this time. She says the pain radiates from her neck all the way down to her fingertips of her left hand. She also notes numbness and tingling. She also has pain with moving her shoulder. No past medical history on file. No family history on file. Current Outpatient Medications   Medication Sig Dispense Refill    methylPREDNISolone (MEDROL DOSEPACK) 4 mg tablet Per dose pack instructions 1 Dose Pack 0       No Known Allergies    No past surgical history on file.     Social History     Socioeconomic History    Marital status: UNKNOWN     Spouse name: Not on file    Number of children: Not on file    Years of education: Not on file    Highest education level: Not on file   Occupational History    Not on file   Social Needs    Financial resource strain: Not on file    Food insecurity     Worry: Not on file     Inability: Not on file    Transportation needs     Medical: Not on file     Non-medical: Not on file   Tobacco Use    Smoking status: Never Smoker    Smokeless tobacco: Never Used   Substance and Sexual Activity    Alcohol use: Not on file    Drug use: Not on file    Sexual activity: Not on file   Lifestyle    Physical activity     Days per week: Not on file     Minutes per session: Not on file    Stress: Not on file   Relationships    Social connections     Talks on phone: Not on file     Gets together: Not on file     Attends [employer] service: Not on file     Active member of club or organization: Not on file     Attends meetings of clubs or organizations: Not on file     Relationship status: Not on file    Intimate partner violence     Fear of current or ex partner: Not on file     Emotionally abused: Not on file     Physically abused: Not on file     Forced sexual activity: Not on file   Other Topics Concern    Not on file   Social History Narrative    Not on file       REVIEW OF SYSTEMS:      CON: negative for recent weight loss/gain, fever, or chills  EYE: negative for double or blurry vision  ENT: negative for hoarseness  RS:   negative for cough, URI, SOB  CV:  negative for chest pain, palpitations  GI:    negative for blood in stool, nausea/vomiting  :  negative for blood in urine  MS: As per HPI  Other systems reviewed and noted below. PHYSICAL EXAMINATION:  Visit Vitals  /80 (BP 1 Location: Right arm, BP Patient Position: Sitting)   Pulse 70   Temp 97.1 °F (36.2 °C) (Oral)   Resp 16   Ht 5' 7\" (1.702 m)   Wt (!) 433 lb (196.4 kg)   SpO2 95%   BMI 67.82 kg/m²     Body mass index is 67.82 kg/m². GENERAL: Alert and oriented x3, in no acute distress, well-developed, well-nourished. HEENT: Normocephalic, atraumatic. RESP: Non labored breathing with equal chest rise on inspiration. CV: Well perfused extremities. No cyanosis or clubbing noted. ABDOMEN: Soft, non-tender, non-distended.    Cervical Spine Examination  Neck ROM   Flexion    Full   Extension   Full   Lateral Rotation  Full  Spurling's    Neg  Focal Neuro Deficits   None  Sensation C5-T1   Full  Strength C5-T1   5/5  Tenderness C Spine   None  Stepoff C Spine   None  Paraspinal Tenderness  None  Other Findings   None    Shoulder Examination     R   L  ROM   FF  Full   Full  ER  Full   Full   IR  Full   Full  Rotator Cuff Pain   Supra  -   +   Infra  -   -   Subscap -   -  Crepitus  -   -  Effusion  -   -  Warmth  -   -   Erythema  -   -  Instability  -   -  AC Joint TTP  -   -  Clavicle   Deformity -   -   TTP  -   -  Proximal Humerus   Deformity -   -   TTP  -   -  Deltoid Strength 5   5  Biceps Strength 5   5  Biceps Deformity -   -  Biceps Groove Pain -   +  Impingement Sign -   -       IMAGING:  X-rays of the cervical spine were taken the office today which show multilevel cervical degeneration    ASSESSMENT & PLAN  Diagnosis: Left shoulder rotator cuff pain as well as left arm radiculopathy    Skye Burgos has arm pain and neck pain as well as cervical radiculopathy. I recommended a Medrol Dosepak as well as a home exercise program.  I have also recommended that she make an appointment with our spine service. She will follow-up as needed.       Electronically signed by: Yesenia Duncan MD

## 2020-10-05 ENCOUNTER — OFFICE VISIT (OUTPATIENT)
Dept: ORTHOPEDIC SURGERY | Age: 55
End: 2020-10-05
Payer: COMMERCIAL

## 2020-10-05 ENCOUNTER — TELEPHONE (OUTPATIENT)
Dept: ORTHOPEDIC SURGERY | Age: 55
End: 2020-10-05

## 2020-10-05 VITALS
SYSTOLIC BLOOD PRESSURE: 139 MMHG | HEART RATE: 74 BPM | DIASTOLIC BLOOD PRESSURE: 79 MMHG | HEIGHT: 67 IN | OXYGEN SATURATION: 99 % | BODY MASS INDEX: 67.82 KG/M2 | TEMPERATURE: 97.9 F

## 2020-10-05 DIAGNOSIS — M25.512 LEFT SHOULDER PAIN, UNSPECIFIED CHRONICITY: Primary | ICD-10-CM

## 2020-10-05 DIAGNOSIS — R20.2 PARESTHESIA OF LEFT THUMB: ICD-10-CM

## 2020-10-05 DIAGNOSIS — M25.512 LEFT SHOULDER PAIN, UNSPECIFIED CHRONICITY: ICD-10-CM

## 2020-10-05 PROCEDURE — 99203 OFFICE O/P NEW LOW 30 MIN: CPT | Performed by: PHYSICAL MEDICINE & REHABILITATION

## 2020-10-05 PROCEDURE — 96372 THER/PROPH/DIAG INJ SC/IM: CPT | Performed by: PHYSICAL MEDICINE & REHABILITATION

## 2020-10-05 RX ORDER — CYCLOBENZAPRINE HCL 10 MG
TABLET ORAL
COMMUNITY
End: 2020-10-05 | Stop reason: DRUGHIGH

## 2020-10-05 RX ORDER — CYCLOBENZAPRINE HCL 5 MG
5-10 TABLET ORAL
Qty: 60 TAB | Refills: 1 | Status: SHIPPED | OUTPATIENT
Start: 2020-10-05 | End: 2021-04-26

## 2020-10-05 RX ORDER — ACETAMINOPHEN 500 MG
TABLET ORAL
COMMUNITY

## 2020-10-05 RX ORDER — KETOROLAC TROMETHAMINE 30 MG/ML
60 INJECTION, SOLUTION INTRAMUSCULAR; INTRAVENOUS ONCE
Status: COMPLETED | OUTPATIENT
Start: 2020-10-05 | End: 2020-10-05

## 2020-10-05 RX ADMIN — KETOROLAC TROMETHAMINE 60 MG: 30 INJECTION, SOLUTION INTRAMUSCULAR; INTRAVENOUS at 10:08

## 2020-10-05 NOTE — TELEPHONE ENCOUNTER
Left voice message for pt informing that she received a Toradol 60mg IM today while in office. Any further questions or concerns please call our office.

## 2020-10-05 NOTE — TELEPHONE ENCOUNTER
Patient called and said she saw Nayely Jimenez today for her neck , that she was given an injection. Patient is asking to know the name and the dosage of the medication. Patient tel. [phone].

## 2020-10-05 NOTE — PROGRESS NOTES
Verbal order entered per Dr. Judy Chen as documented on blue sheet:MRI Shoulder LT due to 6mos pain, decrease ROM. Failed NSAIDs. Flexeril 5mg take 1-2 tabs po QHS prn pain. Disp 60 with 1 refill. Toradol 60mg IM x 1 now, LT deltoid.

## 2020-10-05 NOTE — PROGRESS NOTES
[facility] [address].  [address], [address]  [facility], [address]  Phone: [phone]  Fax: [phone]        Jem Nogueira  : 1965  PCP: None      NEW PATIENT      ASSESSMENT AND PLAN     Diagnoses and all orders for this visit:    1. Left shoulder pain, unspecified chronicity  -     MRI SHOULDER LT WO CONT; Future  -     ketorolac tromethamine (TORADOL) 60 mg/2 mL injection 60 mg    2. Paresthesia of left thumb    Other orders  -     cyclobenzaprine (FLEXERIL) 5 mg tablet; Take 1-2 Tabs by mouth nightly as needed (pain). 1. 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 Her radicular pain has resolved. She does have some chronic thumb paresthesias. 2. Advised to stay active as tolerated. 3. Changed Flexeril to 5 mg 1-2 tab QHS  4. Instructed food with Ibuprofen  5. Instructed to limit Tylenol to 2-3g daily  6. F/u with Dr. Enirque Powers for L shoulder pain  7. Toradol 60 mg  8. L shoulder MRI: 6 mo pain, decreased ROM, failed NSAIDs  9. Given information on MRI      Follow-up and Dispositions    · Return if symptoms worsen or fail to improve. CHIEF COMPLAINT  Ashwini Ferreira is seen today in consultation at the request of Dr. Enrique Powers for complaints of neck and L shoulder pain. HISTORY OF PRESENT ILLNESS  Ashwini Ferreira is a 54 y.o. female. Today pt c/o neck and L shoulder pain of 5 mo duration. Pt denies any specific incident or injury that caused their pain. Pt describes a similar incident about 2 years ago, and it resolved itself after daughter's 16th BD. Affirms a reoccurrence of the pain after more activity, daughter's graduation/etc, but it hasn't gone away. She admits to some pain in her RUE, but rates pain L > R. Describes intermittent numbness/tingling LUE, mostly thumb. Pt reports an incident where her LUE got stuck, and her daughter had to physically move it.  Denies headaches. Affirms trouble sleeping due to pain, but the Flexeril helps. Pt denies any recent GI ulcers, bleeds or renal dysfucntion. Pain Assessment  10/5/2020   Location of Pain Neck; Shoulder;Hand   Location Modifiers Left   Severity of Pain 8   Quality of Pain Sharp;Dull;Aching   Quality of Pain Comment numbness in left thumb   Duration of Pain -   Frequency of Pain Constant   Aggravating Factors -   Aggravating Factors Comment -   Relieving Factors -   Result of Injury -       Does pain radiate into extremities: L shoulder  Does patient have numbness/tingling: intermittent LUE, L thumb  Does patient have weakness: LUE   Pt denies saddle paresthesias. Medications pt is on: Ibuprofen with temporary benefit. Tylenol ES. Flexeril 10 mg QHS for sleep benefit. Denies persistent fevers, chills, weight changes, neurogenic bowel or bladder symptoms. Treatments patient has tried:  Physical therapy:No  Doing HEP: Unknown  Failed medications: Prednisone - no benefit, Robaxin - no benefit,  Spinal injections: No    Spinal surgery- No.   Spine surgery consult: No.     Last C XR 2020: multilevel cervical degeneration     reviewed. RHD. PMHx of asthma. Works nights as an [employer] at [employer]. 2019 GFR normal. ED 2020 arm pain. PAST MEDICAL HISTORY   Past Medical History:   Diagnosis Date    Asthma     Ill-defined condition     anemia       Past Surgical History:   Procedure Laterality Date    HX  SECTION         MEDICATIONS      Current Outpatient Medications   Medication Sig Dispense Refill    acetaminophen (Tylenol Extra Strength) 500 mg tablet Take  by mouth every six (6) hours as needed for Pain.  cyclobenzaprine (FLEXERIL) 5 mg tablet Take 1-2 Tabs by mouth nightly as needed (pain). 60 Tab 1    ibuprofen (MOTRIN) 800 mg tablet 1 tablet by mouth every 8 hours as needed for pain 15 Tab 0       Controlled Substance Monitoring:    No flowsheet data found. ALLERGIES    Allergies   Allergen Reactions    Tetanus And Diphtheria Toxoids Hives and Nausea and Vomiting          SOCIAL HISTORY    Social History     Socioeconomic History    Marital status: UNKNOWN     Spouse name: Not on file    Number of children: Not on file    Years of education: Not on file    Highest education level: Not on file   Occupational History    Not on file   Social Needs    Financial resource strain: Not on file    Food insecurity     Worry: Not on file     Inability: Not on file    Transportation needs     Medical: Not on file     Non-medical: Not on file   Tobacco Use    Smoking status: Never Smoker    Smokeless tobacco: Never Used   Substance and Sexual Activity    Alcohol use: No    Drug use: No    Sexual activity: Not on file   Lifestyle    Physical activity     Days per week: Not on file     Minutes per session: Not on file    Stress: Not on file   Relationships    Social connections     Talks on phone: Not on file     Gets together: Not on file     Attends Taoist service: Not on file     Active member of club or organization: Not on file     Attends meetings of clubs or organizations: Not on file     Relationship status: Not on file    Intimate partner violence     Fear of current or ex partner: Not on file     Emotionally abused: Not on file     Physically abused: Not on file     Forced sexual activity: Not on file   Other Topics Concern    Not on file   Social History Narrative    ** Merged History Encounter **            FAMILY HISTORY    Family History   Problem Relation Age of Onset    Cancer Mother     Diabetes Mother     Hypertension Mother     Cancer Father     No Known Problems Brother          REVIEW OF SYSTEMS  Review of Systems   Constitutional: Negative for chills, fever and weight loss. Respiratory: Negative for shortness of breath. Cardiovascular: Negative for chest pain. Gastrointestinal: Negative for constipation.         Negative for fecal incontinence   Genitourinary: Negative for dysuria. Negative for urinary incontinence   Musculoskeletal: Positive for joint pain and neck pain. Per HPI   Skin: Negative for rash. Neurological: Positive for tingling and focal weakness. Negative for dizziness, tremors and headaches. Endo/Heme/Allergies: Does not bruise/bleed easily. Psychiatric/Behavioral: The patient does not have insomnia. PHYSICAL EXAMINATION  Visit Vitals  /79 (BP 1 Location: Left arm, BP Patient Position: Sitting)   Pulse 74   Temp 97.9 °F (36.6 °C) (Oral)   Ht 5' 7\" (1.702 m)   SpO2 99%   BMI 67.82 kg/m²        Accompanied by self. Constitutional:  Well developed, well nourished, in no acute distress. Psychiatric: Affect and mood are appropriate. Integumentary: No rashes or abrasions noted on exposed areas. Cardiovascular/Peripheral Vascular: No peripheral edema is noted BLE. SPINE/MUSCULOSKELETAL EXAM    Cervical spine:  Neck is midline. Normal muscle tone. No focal atrophy is noted. Limited cervical extension. 90 degrees abduction on L. Pain with PROM. Tenderness to palpation L upper trap. Negative Spurling's sign. Negative Tinel's sign. Negative Gilliland's sign. MOTOR:      Biceps  Triceps Deltoids Wrist Ext Wrist Flex Hand Intrin   Right +4/5 +4/5 +4/5 +4/5 +4/5 +4/5   Left 4/5  PI ->   +4/5 +4/5 +4/5     Ambulation without assistive device. FWB. Written by Kendal Hay, as dictated by Eduard Sahni MD.    I, Dr. Eduard Sahni MD, confirm that all documentation is accurate. Ms. Carola Merlin may have a reminder for a \"due or due soon\" health maintenance. I have asked that she contact her primary care provider for follow-up on this health maintenance.

## 2020-10-05 NOTE — PATIENT INSTRUCTIONS
Magnetic Resonance Imaging (MRI): About This Test 
What is it? Magnetic resonance imaging (MRI) is a test that uses a magnetic field and pulses of radio wave energy to make pictures of organs and structures inside the body. When you have an MRI, you lie on a table and your body is moved into the MRI machine, where an image is taken of the area of the body being studied. Why is this test done? There are many reasons to have an MRI test. This test can find problems such as tumors, bleeding, injury, conditions that some children are born with, and infection. An MRI also may provide more information about a problem seen on an X-ray, ultrasound scan, CT scan, or nuclear medicine exam. 
How can you prepare for the test? 
For some MRI pictures of the belly, you may be asked to not eat or drink for several hours before the test. 
Tell your doctor if you get nervous in tight spaces. You may get a medicine to help you relax. If you think you'll get this medicine, be sure to arrange a ride home. It may be unsafe for you to drive or get home on your own. How is the test done? · You may have contrast materials (dye) put into your arm through a tube called an IV. · You will lie on a table that is part of the MRI scanner. · The table will slide into the space that contains the magnet. · Inside the scanner you will hear a fan and feel air moving. You may hear tapping, thumping, or snapping noises. You may be given earplugs or headphones to reduce the noise. · You will be asked to hold still during the scan. You may be asked to hold your breath for short periods. · You may be alone in the scanning room. But a technologist will watch you through a window and talk with you during the test. 
What are the risks of the test? 
· Contrast material that contains gadolinium may be used in this test. But for most people, the benefit of its use in this test outweighs the risk. Be sure to tell your doctor if you have kidney problems or are pregnant. · If you breastfeed and are concerned about whether the dye used in this test is safe, talk to your doctor. Most experts believe that very little dye passes into breast milk and even less is passed on to the baby. But if you are concerned, you can stop breastfeeding for up to 24 hours after the test. During this time, you can give your baby breast milk that you stored before the test. Don't use the breast milk you pump in the 24 hours after the test. Throw it out. How long does the test take? The test usually takes 30 to 60 minutes. But it can take as long as 2 hours. What happens after the test? 
You will probably be able to go home right away, depending on the reason for the test. 
Follow-up care is a key part of your treatment and safety. Be sure to make and go to all appointments, and call your doctor if you are having problems. It's also a good idea to keep a list of the medicines you take. Ask your doctor when you can expect to have your test results. Where can you learn more? Go to http://www.gray.com/ Enter V016 in the search box to learn more about \"Magnetic Resonance Imaging (MRI): About This Test.\" Current as of: December 9, 2019               Content Version: 12.6 © 6738-7102 [employer]. Care instructions adapted under license by [employer] (which disclaims liability or warranty for this information). If you have questions about a medical condition or this instruction, always ask your healthcare professional. [place] [address] any warranty or liability for your use of this information.

## 2020-10-13 ENCOUNTER — HOSPITAL ENCOUNTER (OUTPATIENT)
Dept: NON INVASIVE DIAGNOSTICS | Age: 55
Discharge: HOME | End: 2020-10-13
Attending: FAMILY MEDICINE
Payer: COMMERCIAL

## 2020-10-13 VITALS
WEIGHT: 293 LBS | DIASTOLIC BLOOD PRESSURE: 79 MMHG | SYSTOLIC BLOOD PRESSURE: 139 MMHG | BODY MASS INDEX: 45.99 KG/M2 | HEIGHT: 67 IN

## 2020-10-13 DIAGNOSIS — M79.606 LEG PAIN: ICD-10-CM

## 2020-10-13 DIAGNOSIS — R60.0 LOCALIZED EDEMA: ICD-10-CM

## 2020-10-13 DIAGNOSIS — R06.02 SHORTNESS OF BREATH: ICD-10-CM

## 2020-10-13 LAB
ECHO AO ROOT DIAM: 3.55 CM
ECHO IVC PROX: 2.27 CM
ECHO LA AREA 4C: 27.56 CM2
ECHO LA VOL 2C: 97.72 ML (ref 22–52)
ECHO LA VOL 4C: 101.43 ML (ref 22–52)
ECHO LA VOL BP: 105.39 ML (ref 22–52)
ECHO LA VOL/BSA BIPLANE: 37.53 ML/M2 (ref 16–28)
ECHO LA VOLUME INDEX A2C: 34.8 ML/M2 (ref 16–28)
ECHO LA VOLUME INDEX A4C: 36.12 ML/M2 (ref 16–28)
ECHO LV INTERNAL DIMENSION DIASTOLIC: 5.13 CM (ref 3.9–5.3)
ECHO LV INTERNAL DIMENSION SYSTOLIC: 2.81 CM
ECHO LV IVSD: 0.96 CM (ref 0.6–0.9)
ECHO LV MASS 2D: 166.4 G (ref 67–162)
ECHO LV MASS INDEX 2D: 59.3 G/M2 (ref 43–95)
ECHO LV POSTERIOR WALL DIASTOLIC: 0.85 CM (ref 0.6–0.9)
ECHO LVOT DIAM: 2.08 CM
ECHO LVOT PEAK GRADIENT: 4.03 MMHG
ECHO LVOT PEAK VELOCITY: 100.33 CM/S
ECHO LVOT SV: 81 ML
ECHO LVOT VTI: 23.91 CM
ECHO MV A VELOCITY: 75.94 CM/S
ECHO MV E DECELERATION TIME (DT): 0.18 S
ECHO MV E VELOCITY: 70.66 CM/S
ECHO MV E/A RATIO: 0.93
ECHO TV REGURGITANT MAX VELOCITY: 318.76 CM/S
ECHO TV REGURGITANT PEAK GRADIENT: 40.64 MMHG
LVOT MG: 2.26 MMHG

## 2020-10-13 PROCEDURE — 93306 TTE W/DOPPLER COMPLETE: CPT

## 2020-10-14 ENCOUNTER — HOSPITAL ENCOUNTER (OUTPATIENT)
Age: 55
Discharge: HOME | End: 2020-10-14
Attending: PHYSICAL MEDICINE & REHABILITATION
Payer: COMMERCIAL

## 2020-10-14 PROCEDURE — 73221 MRI JOINT UPR EXTREM W/O DYE: CPT

## 2020-10-23 ENCOUNTER — OFFICE VISIT (OUTPATIENT)
Dept: ORTHOPEDIC SURGERY | Age: 55
End: 2020-10-23
Payer: COMMERCIAL

## 2020-10-23 VITALS
WEIGHT: 293 LBS | HEART RATE: 78 BPM | RESPIRATION RATE: 16 BRPM | BODY MASS INDEX: 45.99 KG/M2 | DIASTOLIC BLOOD PRESSURE: 85 MMHG | TEMPERATURE: 96.2 F | OXYGEN SATURATION: 98 % | SYSTOLIC BLOOD PRESSURE: 141 MMHG | HEIGHT: 67 IN

## 2020-10-23 DIAGNOSIS — M75.122 NONTRAUMATIC COMPLETE TEAR OF LEFT ROTATOR CUFF: Primary | ICD-10-CM

## 2020-10-23 DIAGNOSIS — S46.102A INJURY OF TENDON OF LONG HEAD OF LEFT BICEPS, INITIAL ENCOUNTER: ICD-10-CM

## 2020-10-23 PROCEDURE — 99214 OFFICE O/P EST MOD 30 MIN: CPT | Performed by: ORTHOPAEDIC SURGERY

## 2020-10-23 NOTE — PROGRESS NOTES
Patient: Jose Barnes                MRN: [id number]       SSN: [id number]  YOB: 1965        AGE: 54 y.o. SEX: female  Body mass index is 68.91 kg/m². PCP: Corina Holder MD  10/23/20    Chief Complaint: Left shoulder pain follow up    HPI: Jose Barnes is a 54 y.o. female patient who returns to the office today for her left shoulder. She has been seen in spine. She continues to complain of some left shoulder pain despite spine treatment and was sent for an MRI. She is here today to discuss the findings of the MRI. She continues to complain of pain in the left shoulder but she rates it as 8 out of 10. She also has difficulty with raising her arm and sleeping at night. Past Medical History:   Diagnosis Date    Asthma     Ill-defined condition     anemia       Family History   Problem Relation Age of Onset    Cancer Mother     Diabetes Mother     Hypertension Mother     Cancer Father     No Known Problems Brother        Current Outpatient Medications   Medication Sig Dispense Refill    acetaminophen (Tylenol Extra Strength) 500 mg tablet Take  by mouth every six (6) hours as needed for Pain.  cyclobenzaprine (FLEXERIL) 5 mg tablet Take 1-2 Tabs by mouth nightly as needed (pain).  60 Tab 1    ibuprofen (MOTRIN) 800 mg tablet 1 tablet by mouth every 8 hours as needed for pain 15 Tab 0       Allergies   Allergen Reactions    Tetanus And Diphtheria Toxoids Hives and Nausea and Vomiting       Past Surgical History:   Procedure Laterality Date    HX  SECTION         Social History     Socioeconomic History    Marital status:      Spouse name: Not on file    Number of children: Not on file    Years of education: Not on file    Highest education level: Not on file   Occupational History    Not on file   Social Needs    Financial resource strain: Not on file    Food insecurity     Worry: Not on file     Inability: Not on file    Transportation needs     Medical: Not on file     Non-medical: Not on file   Tobacco Use    Smoking status: Never Smoker    Smokeless tobacco: Never Used   Substance and Sexual Activity    Alcohol use: No    Drug use: No    Sexual activity: Not on file   Lifestyle    Physical activity     Days per week: Not on file     Minutes per session: Not on file    Stress: Not on file   Relationships    Social connections     Talks on phone: Not on file     Gets together: Not on file     Attends Bahai service: Not on file     Active member of club or organization: Not on file     Attends meetings of clubs or organizations: Not on file     Relationship status: Not on file    Intimate partner violence     Fear of current or ex partner: Not on file     Emotionally abused: Not on file     Physically abused: Not on file     Forced sexual activity: Not on file   Other Topics Concern    Not on file   Social History Narrative    ** Merged History Encounter **            REVIEW OF SYSTEMS:      No changes from previous review of systems unless noted. PHYSICAL EXAMINATION:  Visit Vitals  BP (!) 141/85 (BP 1 Location: Right arm, BP Patient Position: Sitting)   Pulse 78   Temp (!) 96.2 °F (35.7 °C) (Temporal)   Resp 16   Ht 5' 7\" (1.702 m)   Wt (!) 440 lb (199.6 kg)   SpO2 98%   BMI 68.91 kg/m²     Body mass index is 68.91 kg/m². GENERAL: Alert and oriented x3, in no acute distress. HEENT: Normocephalic, atraumatic. RESP: Non labored breathing. SKIN: No rashes or lesions noted.    Shoulder Examination     R   L  ROM   FF  Full   Full  ER  Full   Full   IR  Full   Full  Rotator Cuff Pain   Supra  -   +   Infra  -   -   Subscap -   -  Crepitus  -   -  Effusion  -   -  Warmth  -   -   Erythema  -   -  Instability  -   -  AC Joint TTP  -   -  Clavicle   Deformity -   -   TTP  -   -  Proximal Humerus   Deformity -   -   TTP  -   -  Deltoid Strength 5   5  Biceps Strength 5   5  Biceps Deformity -   -  Biceps Groove Pain -   +  Impingement Sign -   -       IMAGING:  An MRI of the left shoulder was reviewed in the office. I personally viewed these images. This shows a full-thickness tear of the anterior aspect of the supraspinatus rotator cuff tendon with minimal retraction and no fatty atrophy. ASSESSMENT & PLAN  Diagnosis: Left shoulder rotator cuff tear, biceps pain    I discussed the physical exam and MRI findings with Dimas Billings today at length. Due to her age and activity level as well as the presence of a full-thickness tear we discussed an arthroscopic rotator cuff repair. She would like to move forward with this after discussing the risk benefits as well as recovery. She would likely get this done over the next few months possibly after the holidays which I think is reasonable. I will plan to see her back in preoperative follow-up or if she has any further questions she will contact the office. The patient was counseled at length about the risks of kim Covid-19 during their perioperative period and any recovery window from their procedure. The patient was made aware that kim Covid-19  may worsen their prognosis for recovering from their procedure and lend to a higher morbidity and/or mortality risk. All material risks, benefits, and reasonable alternatives including postponing the procedure were discussed. The patient does  wish to proceed with the procedure at this time.           Electronically signed by: Syl Infante MD

## 2021-03-04 ENCOUNTER — DOCUMENTATION ONLY (OUTPATIENT)
Dept: ORTHOPEDIC SURGERY | Age: 56
End: 2021-03-04

## 2021-03-04 DIAGNOSIS — S46.102A INJURY OF TENDON OF LONG HEAD OF LEFT BICEPS, INITIAL ENCOUNTER: ICD-10-CM

## 2021-03-04 DIAGNOSIS — M75.122 NONTRAUMATIC COMPLETE TEAR OF LEFT ROTATOR CUFF: Primary | ICD-10-CM

## 2021-03-04 DIAGNOSIS — Z01.810 PREOP CARDIOVASCULAR EXAM: ICD-10-CM

## 2021-03-04 DIAGNOSIS — Z01.812 PRE-OPERATIVE LABORATORY EXAMINATION: ICD-10-CM

## 2021-03-04 DIAGNOSIS — Z01.811 PRE-OP CHEST EXAM: ICD-10-CM

## 2021-03-04 DIAGNOSIS — M75.122 NONTRAUMATIC COMPLETE TEAR OF LEFT ROTATOR CUFF: ICD-10-CM

## 2021-03-04 NOTE — PROGRESS NOTES
Patient came into the [facility] office to drop off [facility] paperwork to be completed. She stated that she needs them back to her employer by Tuesday 3/9/2021. Patient is aware of the 7-10 business day and form fee. Please advise patient at [phone].

## 2021-03-08 NOTE — PROGRESS NOTES
Form completed. Faxed back to [facility] as requested by patient. Copy made to scan into chart. Patient called and notified.

## 2021-03-09 ENCOUNTER — TELEPHONE (OUTPATIENT)
Dept: ORTHOPEDIC SURGERY | Age: 56
End: 2021-03-09

## 2021-03-09 NOTE — TELEPHONE ENCOUNTER
Patient called stating she picked up her paperwork on today. However she mentioned there is an error with her returning to work date. She says she is required to return to work in 3 months and the documentation say 2 1/2 months.  Please advise patient once the documentation is ready for  [phone]

## 2021-03-18 ENCOUNTER — TELEPHONE (OUTPATIENT)
Dept: ORTHOPEDIC SURGERY | Age: 56
End: 2021-03-18

## 2021-03-18 NOTE — TELEPHONE ENCOUNTER
Patient came into the [facility] view office stating that her leave was denied and that she has called before regarding the forms being incorrect. She stated that the forms need to state from 4/8/2021 to 7/8/2021 for her leave dates. She needs this corrected as soon as possible.  Please advise patient at [phone]

## 2021-03-19 NOTE — TELEPHONE ENCOUNTER
Forms corrected. Faxed and confirmation was received. Patient notified. Given to Eric at the  to scan into chart.

## 2021-04-02 ENCOUNTER — HOSPITAL ENCOUNTER (OUTPATIENT)
Dept: LAB | Age: 56
Discharge: HOME | End: 2021-04-02
Payer: COMMERCIAL

## 2021-04-02 ENCOUNTER — HOSPITAL ENCOUNTER (OUTPATIENT)
Dept: GENERAL RADIOLOGY | Age: 56
Discharge: HOME | End: 2021-04-02
Payer: COMMERCIAL

## 2021-04-02 ENCOUNTER — OFFICE VISIT (OUTPATIENT)
Dept: ORTHOPEDIC SURGERY | Age: 56
End: 2021-04-02

## 2021-04-02 VITALS
HEART RATE: 77 BPM | HEIGHT: 67 IN | RESPIRATION RATE: 16 BRPM | BODY MASS INDEX: 45.99 KG/M2 | OXYGEN SATURATION: 100 % | TEMPERATURE: 96.8 F | DIASTOLIC BLOOD PRESSURE: 68 MMHG | WEIGHT: 293 LBS | SYSTOLIC BLOOD PRESSURE: 120 MMHG

## 2021-04-02 DIAGNOSIS — Z01.818 PRE-OP EXAM: ICD-10-CM

## 2021-04-02 DIAGNOSIS — S46.102A INJURY OF TENDON OF LONG HEAD OF LEFT BICEPS, INITIAL ENCOUNTER: ICD-10-CM

## 2021-04-02 DIAGNOSIS — M75.122 NONTRAUMATIC COMPLETE TEAR OF LEFT ROTATOR CUFF: ICD-10-CM

## 2021-04-02 DIAGNOSIS — Z01.812 PRE-OPERATIVE LABORATORY EXAMINATION: ICD-10-CM

## 2021-04-02 DIAGNOSIS — M75.122 NONTRAUMATIC COMPLETE TEAR OF LEFT ROTATOR CUFF: Primary | ICD-10-CM

## 2021-04-02 LAB
ANION GAP SERPL CALC-SCNC: 10 MMOL/L (ref 3–18)
ATRIAL RATE: 88 BPM
BASOPHILS # BLD: 0 K/UL (ref 0–0.1)
BASOPHILS NFR BLD: 1 % (ref 0–2)
BUN SERPL-MCNC: 27 MG/DL (ref 7–18)
BUN/CREAT SERPL: 36 (ref 12–20)
CALCIUM SERPL-MCNC: 8.8 MG/DL (ref 8.5–10.1)
CALCULATED P AXIS, ECG09: 46 DEGREES
CALCULATED R AXIS, ECG10: 6 DEGREES
CALCULATED T AXIS, ECG11: 44 DEGREES
CHLORIDE SERPL-SCNC: 107 MMOL/L (ref 100–111)
CO2 SERPL-SCNC: 25 MMOL/L (ref 21–32)
CREAT SERPL-MCNC: 0.75 MG/DL (ref 0.6–1.3)
DIAGNOSIS, 93000: NORMAL
DIFFERENTIAL METHOD BLD: ABNORMAL
EOSINOPHIL # BLD: 0.2 K/UL (ref 0–0.4)
EOSINOPHIL NFR BLD: 3 % (ref 0–5)
ERYTHROCYTE [DISTWIDTH] IN BLOOD BY AUTOMATED COUNT: 16.5 % (ref 11.6–14.5)
GLUCOSE SERPL-MCNC: 85 MG/DL (ref 74–99)
HCT VFR BLD AUTO: 37.1 % (ref 35–45)
HGB BLD-MCNC: 11.4 G/DL (ref 12–16)
LYMPHOCYTES # BLD: 2 K/UL (ref 0.9–3.6)
LYMPHOCYTES NFR BLD: 34 % (ref 21–52)
MCH RBC QN AUTO: 25.4 PG (ref 24–34)
MCHC RBC AUTO-ENTMCNC: 30.7 G/DL (ref 31–37)
MCV RBC AUTO: 82.6 FL (ref 74–97)
MONOCYTES # BLD: 0.6 K/UL (ref 0.05–1.2)
MONOCYTES NFR BLD: 10 % (ref 3–10)
NEUTS SEG # BLD: 3 K/UL (ref 1.8–8)
NEUTS SEG NFR BLD: 52 % (ref 40–73)
P-R INTERVAL, ECG05: 152 MS
PLATELET # BLD AUTO: 313 K/UL (ref 135–420)
PMV BLD AUTO: 9.7 FL (ref 9.2–11.8)
POTASSIUM SERPL-SCNC: 4.2 MMOL/L (ref 3.5–5.5)
Q-T INTERVAL, ECG07: 348 MS
QRS DURATION, ECG06: 82 MS
QTC CALCULATION (BEZET), ECG08: 421 MS
RBC # BLD AUTO: 4.49 M/UL (ref 4.2–5.3)
SODIUM SERPL-SCNC: 142 MMOL/L (ref 136–145)
VENTRICULAR RATE, ECG03: 88 BPM
WBC # BLD AUTO: 5.8 K/UL (ref 4.6–13.2)

## 2021-04-02 PROCEDURE — 93005 ELECTROCARDIOGRAM TRACING: CPT

## 2021-04-02 PROCEDURE — 36415 COLL VENOUS BLD VENIPUNCTURE: CPT

## 2021-04-02 PROCEDURE — 80048 BASIC METABOLIC PNL TOTAL CA: CPT

## 2021-04-02 PROCEDURE — 71046 X-RAY EXAM CHEST 2 VIEWS: CPT

## 2021-04-02 PROCEDURE — 85025 COMPLETE CBC W/AUTO DIFF WBC: CPT

## 2021-04-02 NOTE — H&P (VIEW-ONLY)
HISTORY AND PHYSICAL Patient: Hortensia Barrera                MRN: [id number]       SSN: [id number] YOB: 1965          AGE: 64 y.o. SEX: female Patient scheduled for:  LEFT SHOULDER ARTHROSCOPIC ROTATOR CUFF REPAIR, GLENO-HUMERAL DEBRIDEMENT/BICEPS TENOTOMY Surgeon: Hammad Campoverde MD 
 
ANESTHESIA TYPE:  General 
 
HISTORY:  
 
The patient was seen in the office today for a preoperative history and physical for an upcoming above listed surgery. The patient is a pleasant 64 y.o. female who has a history of  left shoulder. She has been seen in spine. She continues to complain of some left shoulder pain despite spine treatment and was sent for an MRI. She is here today to discuss the findings of the MRI. She continues to complain of pain in the left shoulder but she rates it as 8 out of 10. She also has difficulty with raising her arm and sleeping at night. Due to the current findings, affected activity of daily living and continued pain and discomfort, surgical intervention is indicated. The alternatives, risks, and complications, including but not limited to infection, blood loss, need for blood transfusion, neurovascular damage, waylon-incisional numbness, subcutaneous hematoma, bone fracture, anesthetic complications, DVT, PE, death, RSD, postoperative stiffness and pain, possible surgical scar, delayed healing and nonhealing, reflexive sympathetic dystrophy, damage to blood vessels and nerves, need for more surgery, MI, and stroke,  failure of hardware, gait disturbances,have been discussed. The patient understands and wishes to proceed with surgery. PAST MEDICAL HISTORY:  
 
Past Medical History:  
Diagnosis Date  Asthma  Ill-defined condition   
 anemia CURRENT MEDICATIONS:  
 
Current Outpatient Medications Medication Sig Dispense Refill  acetaminophen (Tylenol Extra Strength) 500 mg tablet Take  by mouth every six (6) hours as needed for Pain.  ibuprofen (MOTRIN) 800 mg tablet 1 tablet by mouth every 8 hours as needed for pain 15 Tab 0  cyclobenzaprine (FLEXERIL) 5 mg tablet Take 1-2 Tabs by mouth nightly as needed (pain). 60 Tab 1 ALLERGIES:  
 
Allergies Allergen Reactions  Tetanus And Diphtheria Toxoids Hives and Nausea and Vomiting SURGICAL HISTORY:  
 
Past Surgical History:  
Procedure Laterality Date  HX  SECTION    
 
 
SOCIAL HISTORY:  
 
Social History Socioeconomic History  Marital status: UNKNOWN Spouse name: Not on file  Number of children: Not on file  Years of education: Not on file  Highest education level: Not on file Tobacco Use  Smoking status: Never Smoker  Smokeless tobacco: Never Used Substance and Sexual Activity  Alcohol use: No  
 Drug use: No  
Social History Narrative ** Merged History Encounter ** FAMILY HISTORY:  
 
Family History Problem Relation Age of Onset  Cancer Mother  Diabetes Mother  Hypertension Mother  Cancer Father  No Known Problems Brother REVIEW OF SYSTEMS:  
 
Negative for fevers, chills, chest pain, shortness of breath, weight loss, recent illness General: Negative for fever and chills. No unexpected change in weight. Denies fatigue. No change in appetite. Skin: Negative for rash or itching. HEENT: Negative for congestion, sore throat, neck pain and neck stiffness. No change in vision or hearing. Hasn't noted any enlarged lymph nodes in the neck. Cardiovascular:  Negative for chest pain and palpitations. Has not noted pedal edema. Respiratory: Negative for cough, colds, sinus, hemoptysis, shortness of breath and wheezing. Gastrointestinal: Negative for nausea and vomiting, rectal bleeding, coffee ground emesis, abdominal pain, diarrhea and constipation. Genitourinary: Negative for dysuria, frequency urgency, or burning on micturition.  No flank pain, no foul smelling urine, no difficulty with initiating urination. Hematological: Negative for bleeding or easy bruising. Musculoskeletal: Negative  for arthralgias, back pain or neck pain. Neurological: Negative for dizziness, seizures or syncopal episodes. Denies headaches. Endocrine: Denies excessive thirst.  No heat/cold intolerance. Psychiatric: Negative for depression or insomnia. PHYSICAL EXAMINATION:  
 
VITALS:  
Visit Vitals /68 Pulse 77 Temp 96.8 °F (36 °C) (Temporal) Resp 16 Ht 5' 7\" (1.702 m) Wt (!) 442 lb (200.5 kg) SpO2 100% BMI 69.23 kg/m² GEN:  Well developed, well nourished 64 y.o. female in no acute distress. HEENT: Normocephalic and atraumatic. Eyes: Conjunctivae and EOM are normal.Pupils are equal, round, and reactive to light. External ear normal appearance, external nose normal appearing. Mouth/Throat: Oropharynx is clear and moist, able to handle oral secretions w/out difficulty, airway patent NECK: Supple. Normal ROM, No lymphadenopathy. Trachea is midline. No bruising, swelling or deformity RESP: Clear to auscultation bilaterally. No wheezes, rales, rhonchi. Normal effort and breath sounds. No respiratory distress CARDIO: Normal rate, regular rhythm and normal heart sounds. No MGR. ABDOMEN: Soft, non-tender, non-distended, normoactive bowel sounds in all four quadrants. There is no tenderness. There is no rebound and no guarding. BACK: No CVA or spinal tenderness BREAST:  Deferred PELVIC:    Deferred RECTAL:  Deferred :           Deferred EXTREMITIES: EXAMINATION OF: left shoulder Shoulder Examination R                                  L ROM 
            FF                    Full                              Full ER                   Full                              Full IR                     Full                              Full Rotator Cuff Pain             Supra               - + 
            Infra                 -                                   - 
            Subscap          -                                   - 
Crepitus                       -                                   - 
Effusion                       -                                   - 
Warmth                       -                                   -            
Erythema                     -                                   - 
Instability                     -                                   - 
AC Joint TTP               -                                   - 
Clavicle Deformity         -                                   - 
            TTP                 -                                   - 
Proximal Humerus Deformity         -                                   - 
            TTP                 -                                   - 
Deltoid Strength          5                                  5 
Biceps Strength          5                                  5 
Biceps Deformity         -                                   - 
Biceps Groove Pain    -                                   + 
Impingement Sign       -                                   -            
  
NEUROVASCULAR: Sensation intact to light touch and strength grossly intact and symmetrical. No nystagmus. Positive distal pulses and capillary refill. DVT ASSESSMENT:  There is not  calf tenderness. No evidence of DVT seen on physical exam. 
MOTOR: In tact PSYCH: Alert an oriented to person, place and time. Mood, memory, affect, behavior and judgment normal 
  
 
RADIOGRAPHS & DIAGNOSTIC STUDIES:  
 
MRI/xray reveals : An MRI of the left shoulder was reviewed in the office. I personally viewed these images. This shows a full-thickness tear of the anterior aspect of the supraspinatus rotator cuff tendon with minimal retraction and no fatty atrophy. LABS:  
 
Labs and EKG pending ASSESSMENT:  
 
 Encounter Diagnoses Name Primary?  Nontraumatic complete tear of left rotator cuff Yes  Injury of tendon of long head of left biceps, initial encounter  Pre-op exam   
 
 
PLAN:  
 
Again, the alternatives, risks, and complications, as well as expected outcome were discussed. The patient understands and agrees to proceed with LEFT SHOULDER ARTHROSCOPIC ROTATOR CUFF REPAIR, GLENO-HUMERAL DEBRIDEMENT/BICEPS TENOTOMY. Patient given orders listed below: No orders of the defined types were placed in this encounter.  
 
 
 
Ean Palma PA-C 
4/2/2021 
9:48 AM

## 2021-04-02 NOTE — PROGRESS NOTES
HISTORY AND PHYSICAL          Patient: Jane Alex                MRN: [id number]       SSN: [id number]  YOB: 1965          AGE: 64 y.o. SEX: female      Patient scheduled for:  LEFT SHOULDER ARTHROSCOPIC ROTATOR CUFF REPAIR, GLENO-HUMERAL DEBRIDEMENT/BICEPS TENOTOMY    Surgeon: Margaret Moura MD    ANESTHESIA TYPE:  General    HISTORY:     The patient was seen in the office today for a preoperative history and physical for an upcoming above listed surgery. The patient is a pleasant 64 y.o. female who has a history of  left shoulder. She has been seen in spine. She continues to complain of some left shoulder pain despite spine treatment and was sent for an MRI. She is here today to discuss the findings of the MRI. She continues to complain of pain in the left shoulder but she rates it as 8 out of 10. She also has difficulty with raising her arm and sleeping at night. Due to the current findings, affected activity of daily living and continued pain and discomfort, surgical intervention is indicated. The alternatives, risks, and complications, including but not limited to infection, blood loss, need for blood transfusion, neurovascular damage, waylon-incisional numbness, subcutaneous hematoma, bone fracture, anesthetic complications, DVT, PE, death, RSD, postoperative stiffness and pain, possible surgical scar, delayed healing and nonhealing, reflexive sympathetic dystrophy, damage to blood vessels and nerves, need for more surgery, MI, and stroke,  failure of hardware, gait disturbances,have been discussed. The patient understands and wishes to proceed with surgery.      PAST MEDICAL HISTORY:     Past Medical History:   Diagnosis Date    Asthma     Ill-defined condition     anemia       CURRENT MEDICATIONS:     Current Outpatient Medications   Medication Sig Dispense Refill    acetaminophen (Tylenol Extra Strength) 500 mg tablet Take  by mouth every six (6) hours as needed for Pain.  ibuprofen (MOTRIN) 800 mg tablet 1 tablet by mouth every 8 hours as needed for pain 15 Tab 0    cyclobenzaprine (FLEXERIL) 5 mg tablet Take 1-2 Tabs by mouth nightly as needed (pain). 60 Tab 1       ALLERGIES:     Allergies   Allergen Reactions    Tetanus And Diphtheria Toxoids Hives and Nausea and Vomiting         SURGICAL HISTORY:     Past Surgical History:   Procedure Laterality Date    HX  SECTION         SOCIAL HISTORY:     Social History     Socioeconomic History    Marital status: UNKNOWN     Spouse name: Not on file    Number of children: Not on file    Years of education: Not on file    Highest education level: Not on file   Tobacco Use    Smoking status: Never Smoker    Smokeless tobacco: Never Used   Substance and Sexual Activity    Alcohol use: No    Drug use: No   Social History Narrative    ** Merged History Encounter **            FAMILY HISTORY:     Family History   Problem Relation Age of Onset    Cancer Mother     Diabetes Mother     Hypertension Mother     Cancer Father     No Known Problems Brother        REVIEW OF SYSTEMS:     Negative for fevers, chills, chest pain, shortness of breath, weight loss, recent illness     General: Negative for fever and chills. No unexpected change in weight. Denies fatigue. No change in appetite. Skin: Negative for rash or itching. HEENT: Negative for congestion, sore throat, neck pain and neck stiffness. No change in vision or hearing. Hasn't noted any enlarged lymph nodes in the neck. Cardiovascular:  Negative for chest pain and palpitations. Has not noted pedal edema. Respiratory: Negative for cough, colds, sinus, hemoptysis, shortness of breath and wheezing. Gastrointestinal: Negative for nausea and vomiting, rectal bleeding, coffee ground emesis, abdominal pain, diarrhea and constipation. Genitourinary: Negative for dysuria, frequency urgency, or burning on micturition.  No flank pain, no foul smelling urine, no difficulty with initiating urination. Hematological: Negative for bleeding or easy bruising. Musculoskeletal: Negative  for arthralgias, back pain or neck pain. Neurological: Negative for dizziness, seizures or syncopal episodes. Denies headaches. Endocrine: Denies excessive thirst.  No heat/cold intolerance. Psychiatric: Negative for depression or insomnia. PHYSICAL EXAMINATION:     VITALS:   Visit Vitals  /68   Pulse 77   Temp 96.8 °F (36 °C) (Temporal)   Resp 16   Ht 5' 7\" (1.702 m)   Wt (!) 442 lb (200.5 kg)   SpO2 100%   BMI 69.23 kg/m²     GEN:  Well developed, well nourished 64 y.o. female in no acute distress. HEENT: Normocephalic and atraumatic. Eyes: Conjunctivae and EOM are normal.Pupils are equal, round, and reactive to light. External ear normal appearance, external nose normal appearing. Mouth/Throat: Oropharynx is clear and moist, able to handle oral secretions w/out difficulty, airway patent  NECK: Supple. Normal ROM, No lymphadenopathy. Trachea is midline. No bruising, swelling or deformity  RESP: Clear to auscultation bilaterally. No wheezes, rales, rhonchi. Normal effort and breath sounds. No respiratory distress  CARDIO: Normal rate, regular rhythm and normal heart sounds. No MGR. ABDOMEN: Soft, non-tender, non-distended, normoactive bowel sounds in all four quadrants. There is no tenderness. There is no rebound and no guarding.    BACK: No CVA or spinal tenderness  BREAST:  Deferred  PELVIC:    Deferred   RECTAL:  Deferred   :           Deferred  EXTREMITIES: EXAMINATION OF: left shoulder  Shoulder Examination                                      R                                  L  ROM              FF                    Full                              Full  ER                   Full                              Full              IR                     Full                              Full  Rotator Cuff Pain              Supra               - +              Infra                 -                                   -              Subscap          -                                   -  Crepitus                       -                                   -  Effusion                       -                                   -  Warmth                       -                                   -             Erythema                     -                                   -  Instability                     -                                   -  AC Joint TTP               -                                   -  Clavicle              Deformity         -                                   -              TTP                 -                                   -  Proximal Humerus              Deformity         -                                   -              TTP                 -                                   -  Deltoid Strength          5                                  5  Biceps Strength          5                                  5  Biceps Deformity         -                                   -  Biceps Groove Pain    -                                   +  Impingement Sign       -                                   -                NEUROVASCULAR: Sensation intact to light touch and strength grossly intact and symmetrical. No nystagmus. Positive distal pulses and capillary refill. DVT ASSESSMENT:  There is not  calf tenderness. No evidence of DVT seen on physical exam.  MOTOR: In tact  PSYCH: Alert an oriented to person, place and time. Mood, memory, affect, behavior and judgment normal       RADIOGRAPHS & DIAGNOSTIC STUDIES:     MRI/xray reveals : An MRI of the left shoulder was reviewed in the office. I personally viewed these images. This shows a full-thickness tear of the anterior aspect of the supraspinatus rotator cuff tendon with minimal retraction and no fatty atrophy.       LABS:     Labs and EKG pending      ASSESSMENT: Encounter Diagnoses   Name Primary?  Nontraumatic complete tear of left rotator cuff Yes    Injury of tendon of long head of left biceps, initial encounter     Pre-op exam        PLAN:     Again, the alternatives, risks, and complications, as well as expected outcome were discussed. The patient understands and agrees to proceed with LEFT SHOULDER ARTHROSCOPIC ROTATOR CUFF REPAIR, GLENO-HUMERAL DEBRIDEMENT/BICEPS TENOTOMY. Patient given orders listed below:    No orders of the defined types were placed in this encounter.         Baldo English PA-C  4/2/2021  9:48 AM

## 2021-04-08 ENCOUNTER — TRANSCRIBE ORDER (OUTPATIENT)
Dept: REGISTRATION | Age: 56
End: 2021-04-08

## 2021-04-08 ENCOUNTER — HOSPITAL ENCOUNTER (OUTPATIENT)
Dept: PREADMISSION TESTING | Age: 56
Discharge: HOME | End: 2021-04-08
Payer: COMMERCIAL

## 2021-04-08 DIAGNOSIS — Z01.818 PRE-OP EXAM: ICD-10-CM

## 2021-04-08 DIAGNOSIS — Z20.828 EXPOSURE TO SARS-ASSOCIATED CORONAVIRUS: ICD-10-CM

## 2021-04-08 DIAGNOSIS — M75.122 NONTRAUMATIC COMPLETE TEAR OF LEFT ROTATOR CUFF: Primary | ICD-10-CM

## 2021-04-08 DIAGNOSIS — S46.102A INJURY OF TENDON OF LONG HEAD OF LEFT BICEPS, INITIAL ENCOUNTER: ICD-10-CM

## 2021-04-08 DIAGNOSIS — Z01.812 BLOOD TESTS PRIOR TO TREATMENT OR PROCEDURE: Primary | ICD-10-CM

## 2021-04-08 DIAGNOSIS — Z01.812 BLOOD TESTS PRIOR TO TREATMENT OR PROCEDURE: ICD-10-CM

## 2021-04-08 PROCEDURE — U0003 INFECTIOUS AGENT DETECTION BY NUCLEIC ACID (DNA OR RNA); SEVERE ACUTE RESPIRATORY SYNDROME CORONAVIRUS 2 (SARS-COV-2) (CORONAVIRUS DISEASE [COVID-19]), AMPLIFIED PROBE TECHNIQUE, MAKING USE OF HIGH THROUGHPUT TECHNOLOGIES AS DESCRIBED BY CMS-2020-01-R: HCPCS

## 2021-04-09 ENCOUNTER — ANESTHESIA EVENT (OUTPATIENT)
Dept: SURGERY | Age: 56
End: 2021-04-09
Payer: COMMERCIAL

## 2021-04-09 LAB — SARS-COV-2, COV2NT: NOT DETECTED

## 2021-04-09 RX ORDER — BISMUTH SUBSALICYLATE 262 MG
1 TABLET,CHEWABLE ORAL DAILY
COMMUNITY

## 2021-04-09 RX ORDER — ACETAMINOPHEN, DIPHENHYDRAMINE HCL, PHENYLEPHRINE HCL 325; 25; 5 MG/1; MG/1; MG/1
5000 TABLET ORAL DAILY
COMMUNITY

## 2021-04-12 ENCOUNTER — HOSPITAL ENCOUNTER (OUTPATIENT)
Age: 56
Setting detail: OUTPATIENT SURGERY
Discharge: HOME | End: 2021-04-12
Attending: ORTHOPAEDIC SURGERY | Admitting: ORTHOPAEDIC SURGERY
Payer: COMMERCIAL

## 2021-04-12 ENCOUNTER — HOSPITAL ENCOUNTER (EMERGENCY)
Age: 56
Discharge: HOME | End: 2021-04-13
Attending: STUDENT IN AN ORGANIZED HEALTH CARE EDUCATION/TRAINING PROGRAM
Payer: COMMERCIAL

## 2021-04-12 ENCOUNTER — ANESTHESIA (OUTPATIENT)
Dept: SURGERY | Age: 56
End: 2021-04-12
Payer: COMMERCIAL

## 2021-04-12 ENCOUNTER — APPOINTMENT (OUTPATIENT)
Dept: GENERAL RADIOLOGY | Age: 56
End: 2021-04-12
Attending: STUDENT IN AN ORGANIZED HEALTH CARE EDUCATION/TRAINING PROGRAM
Payer: COMMERCIAL

## 2021-04-12 VITALS
DIASTOLIC BLOOD PRESSURE: 74 MMHG | SYSTOLIC BLOOD PRESSURE: 135 MMHG | HEIGHT: 67 IN | TEMPERATURE: 98 F | RESPIRATION RATE: 16 BRPM | WEIGHT: 293 LBS | HEART RATE: 72 BPM | BODY MASS INDEX: 45.99 KG/M2 | OXYGEN SATURATION: 95 %

## 2021-04-12 DIAGNOSIS — G89.18 PAIN FOLLOWING SURGERY OR PROCEDURE: Primary | ICD-10-CM

## 2021-04-12 DIAGNOSIS — T88.59XA COMPLICATION OF ANESTHESIA, INITIAL ENCOUNTER: ICD-10-CM

## 2021-04-12 DIAGNOSIS — M75.122 COMPLETE TEAR OF LEFT ROTATOR CUFF, UNSPECIFIED WHETHER TRAUMATIC: ICD-10-CM

## 2021-04-12 DIAGNOSIS — R06.02 SOB (SHORTNESS OF BREATH): Primary | ICD-10-CM

## 2021-04-12 DIAGNOSIS — S46.102A UNSPECIFIED INJURY OF MUSCLE, FASCIA AND TENDON OF LONG HEAD OF BICEPS, LEFT ARM, INITIAL ENCOUNTER: ICD-10-CM

## 2021-04-12 LAB
ANION GAP SERPL CALC-SCNC: 1 MMOL/L (ref 3–18)
BASOPHILS # BLD: 0 K/UL (ref 0–0.1)
BASOPHILS NFR BLD: 0 % (ref 0–2)
BNP SERPL-MCNC: 52 PG/ML (ref 0–900)
BUN SERPL-MCNC: 17 MG/DL (ref 7–18)
BUN/CREAT SERPL: 26 (ref 12–20)
CALCIUM SERPL-MCNC: 9 MG/DL (ref 8.5–10.1)
CHLORIDE SERPL-SCNC: 107 MMOL/L (ref 100–111)
CO2 SERPL-SCNC: 30 MMOL/L (ref 21–32)
CREAT SERPL-MCNC: 0.66 MG/DL (ref 0.6–1.3)
DIFFERENTIAL METHOD BLD: ABNORMAL
EOSINOPHIL # BLD: 0 K/UL (ref 0–0.4)
EOSINOPHIL NFR BLD: 0 % (ref 0–5)
ERYTHROCYTE [DISTWIDTH] IN BLOOD BY AUTOMATED COUNT: 15.9 % (ref 11.6–14.5)
GLUCOSE SERPL-MCNC: 127 MG/DL (ref 74–99)
HCT VFR BLD AUTO: 37 % (ref 35–45)
HGB BLD-MCNC: 11.4 G/DL (ref 12–16)
LYMPHOCYTES # BLD: 0.6 K/UL (ref 0.9–3.6)
LYMPHOCYTES NFR BLD: 10 % (ref 21–52)
MCH RBC QN AUTO: 25.7 PG (ref 24–34)
MCHC RBC AUTO-ENTMCNC: 30.8 G/DL (ref 31–37)
MCV RBC AUTO: 83.3 FL (ref 74–97)
MONOCYTES # BLD: 0.2 K/UL (ref 0.05–1.2)
MONOCYTES NFR BLD: 3 % (ref 3–10)
NEUTS SEG # BLD: 5.3 K/UL (ref 1.8–8)
NEUTS SEG NFR BLD: 87 % (ref 40–73)
PLATELET # BLD AUTO: 251 K/UL (ref 135–420)
PMV BLD AUTO: 8.7 FL (ref 9.2–11.8)
POTASSIUM SERPL-SCNC: 4.3 MMOL/L (ref 3.5–5.5)
RBC # BLD AUTO: 4.44 M/UL (ref 4.2–5.3)
SODIUM SERPL-SCNC: 138 MMOL/L (ref 136–145)
TROPONIN I SERPL-MCNC: <0.02 NG/ML (ref 0–0.04)
WBC # BLD AUTO: 6.1 K/UL (ref 4.6–13.2)

## 2021-04-12 PROCEDURE — 76942 ECHO GUIDE FOR BIOPSY: CPT | Performed by: ANESTHESIOLOGY

## 2021-04-12 PROCEDURE — 29827 SHO ARTHRS SRG RT8TR CUF RPR: CPT | Performed by: ORTHOPAEDIC SURGERY

## 2021-04-12 PROCEDURE — 71045 X-RAY EXAM CHEST 1 VIEW: CPT

## 2021-04-12 PROCEDURE — 85025 COMPLETE CBC W/AUTO DIFF WBC: CPT

## 2021-04-12 PROCEDURE — 77030033005 HC TBNG ARTHSC PMP STRY -B: Performed by: ORTHOPAEDIC SURGERY

## 2021-04-12 PROCEDURE — 01630 ANES OPN/ARTHR PX SHO JT NOS: CPT | Performed by: NURSE ANESTHETIST, CERTIFIED REGISTERED

## 2021-04-12 PROCEDURE — C1713 ANCHOR/SCREW BN/BN,TIS/BN: HCPCS | Performed by: ORTHOPAEDIC SURGERY

## 2021-04-12 PROCEDURE — 76060000035 HC ANESTHESIA 2 TO 2.5 HR: Performed by: ORTHOPAEDIC SURGERY

## 2021-04-12 PROCEDURE — 29823 SHO ARTHRS SRG XTNSV DBRDMT: CPT | Performed by: ORTHOPAEDIC SURGERY

## 2021-04-12 PROCEDURE — 77030019908 HC STETH ESOPH SIMS -A: Performed by: ANESTHESIOLOGY

## 2021-04-12 PROCEDURE — 01630 ANES OPN/ARTHR PX SHO JT NOS: CPT | Performed by: ANESTHESIOLOGY

## 2021-04-12 PROCEDURE — 77030040922 HC BLNKT HYPOTHRM STRY -A: Performed by: ORTHOPAEDIC SURGERY

## 2021-04-12 PROCEDURE — 83880 ASSAY OF NATRIURETIC PEPTIDE: CPT

## 2021-04-12 PROCEDURE — 74011250636 HC RX REV CODE- 250/636: Performed by: ORTHOPAEDIC SURGERY

## 2021-04-12 PROCEDURE — 74011000258 HC RX REV CODE- 258: Performed by: NURSE ANESTHETIST, CERTIFIED REGISTERED

## 2021-04-12 PROCEDURE — 77030010430: Performed by: ORTHOPAEDIC SURGERY

## 2021-04-12 PROCEDURE — 84484 ASSAY OF TROPONIN QUANT: CPT

## 2021-04-12 PROCEDURE — 76210000021 HC REC RM PH II 0.5 TO 1 HR: Performed by: ORTHOPAEDIC SURGERY

## 2021-04-12 PROCEDURE — 77030004453 HC BUR SHV STRY -B: Performed by: ORTHOPAEDIC SURGERY

## 2021-04-12 PROCEDURE — 74011250637 HC RX REV CODE- 250/637: Performed by: NURSE ANESTHETIST, CERTIFIED REGISTERED

## 2021-04-12 PROCEDURE — 74011250636 HC RX REV CODE- 250/636: Performed by: ANESTHESIOLOGY

## 2021-04-12 PROCEDURE — 77030008683 HC TU ET CUF COVD -A: Performed by: ANESTHESIOLOGY

## 2021-04-12 PROCEDURE — 74011000250 HC RX REV CODE- 250: Performed by: NURSE ANESTHETIST, CERTIFIED REGISTERED

## 2021-04-12 PROCEDURE — 77030010427: Performed by: ORTHOPAEDIC SURGERY

## 2021-04-12 PROCEDURE — 76010000131 HC OR TIME 2 TO 2.5 HR: Performed by: ORTHOPAEDIC SURGERY

## 2021-04-12 PROCEDURE — 80048 BASIC METABOLIC PNL TOTAL CA: CPT

## 2021-04-12 PROCEDURE — 93005 ELECTROCARDIOGRAM TRACING: CPT

## 2021-04-12 PROCEDURE — 96374 THER/PROPH/DIAG INJ IV PUSH: CPT

## 2021-04-12 PROCEDURE — 64450 NJX AA&/STRD OTHER PN/BRANCH: CPT | Performed by: ANESTHESIOLOGY

## 2021-04-12 PROCEDURE — 77030006891 HC BLD SHV RESECT STRY -B: Performed by: ORTHOPAEDIC SURGERY

## 2021-04-12 PROCEDURE — 74011250637 HC RX REV CODE- 250/637: Performed by: STUDENT IN AN ORGANIZED HEALTH CARE EDUCATION/TRAINING PROGRAM

## 2021-04-12 PROCEDURE — 77030002916 HC SUT ETHLN J&J -A: Performed by: ORTHOPAEDIC SURGERY

## 2021-04-12 PROCEDURE — 74011250636 HC RX REV CODE- 250/636: Performed by: NURSE ANESTHETIST, CERTIFIED REGISTERED

## 2021-04-12 PROCEDURE — 76210000016 HC OR PH I REC 1 TO 1.5 HR: Performed by: ORTHOPAEDIC SURGERY

## 2021-04-12 PROCEDURE — 74011250636 HC RX REV CODE- 250/636: Performed by: STUDENT IN AN ORGANIZED HEALTH CARE EDUCATION/TRAINING PROGRAM

## 2021-04-12 PROCEDURE — 2709999900 HC NON-CHARGEABLE SUPPLY: Performed by: ORTHOPAEDIC SURGERY

## 2021-04-12 PROCEDURE — 99285 EMERGENCY DEPT VISIT HI MDM: CPT

## 2021-04-12 PROCEDURE — 77030013079 HC BLNKT BAIR HGGR 3M -A: Performed by: ANESTHESIOLOGY

## 2021-04-12 DEVICE — ANCHOR SUT L14.7MM DIA5.5MM BIOCOMPOSITE FULL THRD W/ 1.3MM: Type: IMPLANTABLE DEVICE | Site: SHOULDER | Status: FUNCTIONAL

## 2021-04-12 DEVICE — ANCHOR SUTURE BIOCOMP 4.75X19.1 MM SWIVELOCK C: Type: IMPLANTABLE DEVICE | Site: SHOULDER | Status: FUNCTIONAL

## 2021-04-12 RX ORDER — MIDAZOLAM HYDROCHLORIDE 1 MG/ML
2 INJECTION, SOLUTION INTRAMUSCULAR; INTRAVENOUS ONCE
Status: COMPLETED | OUTPATIENT
Start: 2021-04-12 | End: 2021-04-12

## 2021-04-12 RX ORDER — HYDROCODONE BITARTRATE AND ACETAMINOPHEN 5; 325 MG/1; MG/1
1 TABLET ORAL
Qty: 35 TAB | Refills: 0 | Status: SHIPPED | OUTPATIENT
Start: 2021-04-12 | End: 2021-04-19

## 2021-04-12 RX ORDER — SODIUM CHLORIDE 0.9 % (FLUSH) 0.9 %
5-40 SYRINGE (ML) INJECTION EVERY 8 HOURS
Status: DISCONTINUED | OUTPATIENT
Start: 2021-04-12 | End: 2021-04-12 | Stop reason: HOSPADM

## 2021-04-12 RX ORDER — SODIUM CHLORIDE 9 MG/ML
INJECTION, SOLUTION INTRAVENOUS
Status: COMPLETED | OUTPATIENT
Start: 2021-04-12 | End: 2021-04-12

## 2021-04-12 RX ORDER — FENTANYL CITRATE 50 UG/ML
50 INJECTION, SOLUTION INTRAMUSCULAR; INTRAVENOUS AS NEEDED
Status: DISCONTINUED | OUTPATIENT
Start: 2021-04-12 | End: 2021-04-12 | Stop reason: HOSPADM

## 2021-04-12 RX ORDER — OXYCODONE HYDROCHLORIDE 5 MG/1
5 TABLET ORAL
Status: COMPLETED | OUTPATIENT
Start: 2021-04-12 | End: 2021-04-12

## 2021-04-12 RX ORDER — LIDOCAINE HYDROCHLORIDE 10 MG/ML
0.1 INJECTION, SOLUTION EPIDURAL; INFILTRATION; INTRACAUDAL; PERINEURAL AS NEEDED
Status: DISCONTINUED | OUTPATIENT
Start: 2021-04-12 | End: 2021-04-12 | Stop reason: HOSPADM

## 2021-04-12 RX ORDER — ROPIVACAINE HYDROCHLORIDE 5 MG/ML
30 INJECTION, SOLUTION EPIDURAL; INFILTRATION; PERINEURAL
Status: DISCONTINUED | OUTPATIENT
Start: 2021-04-12 | End: 2021-04-12 | Stop reason: HOSPADM

## 2021-04-12 RX ORDER — ONDANSETRON 2 MG/ML
4 INJECTION INTRAMUSCULAR; INTRAVENOUS
Status: COMPLETED | OUTPATIENT
Start: 2021-04-12 | End: 2021-04-12

## 2021-04-12 RX ORDER — FENTANYL CITRATE 50 UG/ML
100 INJECTION, SOLUTION INTRAMUSCULAR; INTRAVENOUS
Status: DISCONTINUED | OUTPATIENT
Start: 2021-04-12 | End: 2021-04-12 | Stop reason: HOSPADM

## 2021-04-12 RX ORDER — GLYCOPYRROLATE 0.2 MG/ML
INJECTION INTRAMUSCULAR; INTRAVENOUS AS NEEDED
Status: DISCONTINUED | OUTPATIENT
Start: 2021-04-12 | End: 2021-04-12 | Stop reason: HOSPADM

## 2021-04-12 RX ORDER — SODIUM CHLORIDE, SODIUM LACTATE, POTASSIUM CHLORIDE, CALCIUM CHLORIDE 600; 310; 30; 20 MG/100ML; MG/100ML; MG/100ML; MG/100ML
25 INJECTION, SOLUTION INTRAVENOUS CONTINUOUS
Status: DISCONTINUED | OUTPATIENT
Start: 2021-04-12 | End: 2021-04-12 | Stop reason: HOSPADM

## 2021-04-12 RX ORDER — DEXMEDETOMIDINE HYDROCHLORIDE 4 UG/ML
INJECTION, SOLUTION INTRAVENOUS AS NEEDED
Status: DISCONTINUED | OUTPATIENT
Start: 2021-04-12 | End: 2021-04-12 | Stop reason: HOSPADM

## 2021-04-12 RX ORDER — SODIUM CHLORIDE 0.9 % (FLUSH) 0.9 %
5-40 SYRINGE (ML) INJECTION AS NEEDED
Status: DISCONTINUED | OUTPATIENT
Start: 2021-04-12 | End: 2021-04-12 | Stop reason: HOSPADM

## 2021-04-12 RX ORDER — ONDANSETRON 2 MG/ML
INJECTION INTRAMUSCULAR; INTRAVENOUS AS NEEDED
Status: DISCONTINUED | OUTPATIENT
Start: 2021-04-12 | End: 2021-04-12 | Stop reason: HOSPADM

## 2021-04-12 RX ORDER — ONDANSETRON 4 MG/1
4 TABLET, FILM COATED ORAL
Qty: 40 TAB | Refills: 1 | Status: SHIPPED | OUTPATIENT
Start: 2021-04-12 | End: 2021-04-26

## 2021-04-12 RX ORDER — ROCURONIUM BROMIDE 10 MG/ML
INJECTION, SOLUTION INTRAVENOUS AS NEEDED
Status: DISCONTINUED | OUTPATIENT
Start: 2021-04-12 | End: 2021-04-12 | Stop reason: HOSPADM

## 2021-04-12 RX ORDER — ROPIVACAINE HYDROCHLORIDE 5 MG/ML
INJECTION, SOLUTION EPIDURAL; INFILTRATION; PERINEURAL
Status: COMPLETED | OUTPATIENT
Start: 2021-04-12 | End: 2021-04-12

## 2021-04-12 RX ORDER — ONDANSETRON 2 MG/ML
4 INJECTION INTRAMUSCULAR; INTRAVENOUS ONCE
Status: DISCONTINUED | OUTPATIENT
Start: 2021-04-12 | End: 2021-04-12 | Stop reason: HOSPADM

## 2021-04-12 RX ORDER — LIDOCAINE HYDROCHLORIDE 20 MG/ML
2 INJECTION, SOLUTION EPIDURAL; INFILTRATION; INTRACAUDAL; PERINEURAL ONCE
Status: DISCONTINUED | OUTPATIENT
Start: 2021-04-12 | End: 2021-04-12 | Stop reason: HOSPADM

## 2021-04-12 RX ORDER — EPHEDRINE SULFATE/0.9% NACL/PF 50 MG/5 ML
SYRINGE (ML) INTRAVENOUS AS NEEDED
Status: DISCONTINUED | OUTPATIENT
Start: 2021-04-12 | End: 2021-04-12 | Stop reason: HOSPADM

## 2021-04-12 RX ORDER — FENTANYL CITRATE 50 UG/ML
INJECTION, SOLUTION INTRAMUSCULAR; INTRAVENOUS
Status: COMPLETED | OUTPATIENT
Start: 2021-04-12 | End: 2021-04-12

## 2021-04-12 RX ORDER — DEXAMETHASONE SODIUM PHOSPHATE 4 MG/ML
INJECTION, SOLUTION INTRA-ARTICULAR; INTRALESIONAL; INTRAMUSCULAR; INTRAVENOUS; SOFT TISSUE AS NEEDED
Status: DISCONTINUED | OUTPATIENT
Start: 2021-04-12 | End: 2021-04-12 | Stop reason: HOSPADM

## 2021-04-12 RX ORDER — MIDAZOLAM HYDROCHLORIDE 1 MG/ML
INJECTION, SOLUTION INTRAMUSCULAR; INTRAVENOUS
Status: COMPLETED | OUTPATIENT
Start: 2021-04-12 | End: 2021-04-12

## 2021-04-12 RX ORDER — PROPOFOL 10 MG/ML
INJECTION, EMULSION INTRAVENOUS AS NEEDED
Status: DISCONTINUED | OUTPATIENT
Start: 2021-04-12 | End: 2021-04-12 | Stop reason: HOSPADM

## 2021-04-12 RX ORDER — LIDOCAINE HYDROCHLORIDE 20 MG/ML
INJECTION, SOLUTION EPIDURAL; INFILTRATION; INTRACAUDAL; PERINEURAL AS NEEDED
Status: DISCONTINUED | OUTPATIENT
Start: 2021-04-12 | End: 2021-04-12 | Stop reason: HOSPADM

## 2021-04-12 RX ORDER — KETAMINE HCL 50MG/ML(1)
SYRINGE (ML) INTRAVENOUS AS NEEDED
Status: DISCONTINUED | OUTPATIENT
Start: 2021-04-12 | End: 2021-04-12 | Stop reason: HOSPADM

## 2021-04-12 RX ORDER — SUCCINYLCHOLINE CHLORIDE 20 MG/ML
INJECTION INTRAMUSCULAR; INTRAVENOUS AS NEEDED
Status: DISCONTINUED | OUTPATIENT
Start: 2021-04-12 | End: 2021-04-12 | Stop reason: HOSPADM

## 2021-04-12 RX ORDER — NEOSTIGMINE METHYLSULFATE 1 MG/ML
INJECTION, SOLUTION INTRAVENOUS AS NEEDED
Status: DISCONTINUED | OUTPATIENT
Start: 2021-04-12 | End: 2021-04-12 | Stop reason: HOSPADM

## 2021-04-12 RX ORDER — FAMOTIDINE 20 MG/1
20 TABLET, FILM COATED ORAL ONCE
Status: COMPLETED | OUTPATIENT
Start: 2021-04-12 | End: 2021-04-12

## 2021-04-12 RX ORDER — FENTANYL CITRATE 50 UG/ML
100 INJECTION, SOLUTION INTRAMUSCULAR; INTRAVENOUS ONCE
Status: COMPLETED | OUTPATIENT
Start: 2021-04-12 | End: 2021-04-12

## 2021-04-12 RX ADMIN — PHENYLEPHRINE HYDROCHLORIDE 100 MCG: 10 INJECTION INTRAVENOUS at 11:36

## 2021-04-12 RX ADMIN — DEXMEDETOMIDINE HYDROCHLORIDE 10 MCG: 100 INJECTION, SOLUTION, CONCENTRATE INTRAVENOUS at 10:46

## 2021-04-12 RX ADMIN — Medication 10 MG: at 12:16

## 2021-04-12 RX ADMIN — DEXMEDETOMIDINE HYDROCHLORIDE 10 MCG: 100 INJECTION, SOLUTION, CONCENTRATE INTRAVENOUS at 12:01

## 2021-04-12 RX ADMIN — DEXMEDETOMIDINE HYDROCHLORIDE 6 MCG: 100 INJECTION, SOLUTION, CONCENTRATE INTRAVENOUS at 11:47

## 2021-04-12 RX ADMIN — ROCURONIUM BROMIDE 30 MG: 50 INJECTION INTRAVENOUS at 11:01

## 2021-04-12 RX ADMIN — MIDAZOLAM HYDROCHLORIDE 2 MG: 2 INJECTION, SOLUTION INTRAMUSCULAR; INTRAVENOUS at 10:17

## 2021-04-12 RX ADMIN — DEXMEDETOMIDINE HYDROCHLORIDE 6 MCG: 100 INJECTION, SOLUTION, CONCENTRATE INTRAVENOUS at 12:49

## 2021-04-12 RX ADMIN — FENTANYL CITRATE 100 MCG: 50 INJECTION, SOLUTION INTRAMUSCULAR; INTRAVENOUS at 10:17

## 2021-04-12 RX ADMIN — ONDANSETRON 4 MG: 2 INJECTION INTRAMUSCULAR; INTRAVENOUS at 22:35

## 2021-04-12 RX ADMIN — DEXMEDETOMIDINE HYDROCHLORIDE 10 MCG: 100 INJECTION, SOLUTION, CONCENTRATE INTRAVENOUS at 11:56

## 2021-04-12 RX ADMIN — SUCCINYLCHOLINE CHLORIDE 180 MG: 20 INJECTION, SOLUTION INTRAMUSCULAR; INTRAVENOUS at 10:55

## 2021-04-12 RX ADMIN — Medication 4 MG: at 12:50

## 2021-04-12 RX ADMIN — SODIUM CHLORIDE, SODIUM LACTATE, POTASSIUM CHLORIDE, AND CALCIUM CHLORIDE 25 ML/HR: 600; 310; 30; 20 INJECTION, SOLUTION INTRAVENOUS at 09:40

## 2021-04-12 RX ADMIN — LIDOCAINE HYDROCHLORIDE 100 MG: 20 INJECTION, SOLUTION EPIDURAL; INFILTRATION; INTRACAUDAL; PERINEURAL at 10:55

## 2021-04-12 RX ADMIN — ROPIVACAINE HYDROCHLORIDE 30 ML: 5 INJECTION, SOLUTION EPIDURAL; INFILTRATION; PERINEURAL at 10:17

## 2021-04-12 RX ADMIN — DEXMEDETOMIDINE HYDROCHLORIDE 4 MCG: 100 INJECTION, SOLUTION, CONCENTRATE INTRAVENOUS at 11:52

## 2021-04-12 RX ADMIN — FAMOTIDINE 20 MG: 20 TABLET ORAL at 09:40

## 2021-04-12 RX ADMIN — PROPOFOL 150 MG: 10 INJECTION, EMULSION INTRAVENOUS at 10:55

## 2021-04-12 RX ADMIN — ROCURONIUM BROMIDE 20 MG: 50 INJECTION INTRAVENOUS at 11:20

## 2021-04-12 RX ADMIN — Medication 50 MG: at 10:55

## 2021-04-12 RX ADMIN — MIDAZOLAM 2 MG: 1 INJECTION INTRAMUSCULAR; INTRAVENOUS at 10:17

## 2021-04-12 RX ADMIN — DEXAMETHASONE SODIUM PHOSPHATE 8 MG: 4 INJECTION, SOLUTION INTRAMUSCULAR; INTRAVENOUS at 10:55

## 2021-04-12 RX ADMIN — Medication 10 MG: at 12:32

## 2021-04-12 RX ADMIN — OXYCODONE HYDROCHLORIDE 5 MG: 5 TABLET ORAL at 22:35

## 2021-04-12 RX ADMIN — Medication 10 MG: at 11:10

## 2021-04-12 RX ADMIN — PHENYLEPHRINE HYDROCHLORIDE 100 MCG: 10 INJECTION INTRAVENOUS at 11:21

## 2021-04-12 RX ADMIN — GLYCOPYRROLATE 0.6 MG: 0.2 INJECTION INTRAMUSCULAR; INTRAVENOUS at 12:50

## 2021-04-12 RX ADMIN — DEXMEDETOMIDINE HYDROCHLORIDE 10 MCG: 100 INJECTION, SOLUTION, CONCENTRATE INTRAVENOUS at 10:55

## 2021-04-12 RX ADMIN — DEXMEDETOMIDINE HYDROCHLORIDE 14 MCG: 100 INJECTION, SOLUTION, CONCENTRATE INTRAVENOUS at 12:51

## 2021-04-12 RX ADMIN — CEFAZOLIN 3 G: 1 INJECTION, POWDER, FOR SOLUTION INTRAMUSCULAR; INTRAVENOUS; PARENTERAL at 11:09

## 2021-04-12 RX ADMIN — GLYCOPYRROLATE 0.2 MG: 0.2 INJECTION INTRAMUSCULAR; INTRAVENOUS at 11:10

## 2021-04-12 RX ADMIN — ONDANSETRON 4 MG: 2 INJECTION INTRAMUSCULAR; INTRAVENOUS at 10:55

## 2021-04-12 NOTE — OP NOTES
[address] [place] [address]   OPERATIVE REPORT     Patient Name: Apolonia Chaney  Medical Record Number:  [id number]  YOB: 1965  ACCOUNT #:  [de-identified]  DATE OF SERVICE: 4/12/2021     PREOPERATIVE DIAGNOSES:  1. Left shoulder rotator cuff tear  2. Left shoulder long head biceps tendinopathy     POSTOPERATIVE DIAGNOSES:  1. Left shoulder rotator cuff tear, upper border subscapularis and supraspinatus  2. Left shoulder long head biceps tendinopathy and long head biceps tearing     PROCEDURES PERFORMED:  1. Left shoulder arthroscopic rotator cuff repair, upper border subscapularis and supraspinatus  2. Left shoulder glenohumeral joint debridement     SURGEON:  Yo Garcia. Diaz Vega MD     ASSISTANT:   PREETHI Prajapati PA-C was medically necessary for the procedure. She assisted patient positioning, arm holding, arthroscopic management, rotator cuff repair, glenohumeral debridement, wound closure, placement of dressing and sling, and transfer the patient to the PACU. ANESTHESIA:   General with nerve block     COMPLICATIONS:   None     SPECIMENS REMOVED:  None. IMPLANTS:   Arthrex suture anchors     ESTIMATED BLOOD LOSS:   Minimal     IV FLUIDS:   700 cc LR     INDICATIONS FOR PROCEDURE:   Apolonia Chaney is a 51-year-old female presents to the office with left shoulder pain. An MRI revealed a full-thickness tear of the rotator cuff supraspinatus. After discussing treatment options at length and failing conservative management she elected undergo the procedure as described. PROCEDURE:   The patient was identified in the preoperative holding area. The left shoulder was marked as the operative extremity after confirmation with the patient. After discussing the risk and benefits of the procedure informed consent was confirmed. Anesthesia performed a nerve block. The patient was then taken to the operating room.   She was moved from the stretcher to the OR table. General anesthesia was induced and she was intubated. She was brought into the beachchair position. The left arm was prepped and draped in normal sterile fashion. A timeout was performed. Antibiotics were given prior to skin incision    The surgery began with a posterior portal placement. The arthroscope was brought into the glenohumeral joint. Glenohumeral joint inspection was performed verifying a full-thickness tear of the supraspinatus as well as an upper border subscapularis tear. There was also tearing of the long head of the biceps both at the anchor of the biceps as well as in the intra-articular portion of the biceps tendon. An anterior portal was made in the rotator interval.  The rotator interval was debrided. The upper border the subscapularis was debrided. The supraspinatus tear was also debrided. The long head of the biceps was tenotomized and the biceps stump as well as the labrum was debrided as well. Working through an anterior portal a fiber tape suture was passed through the upper border the subscapularis tear. The footprint of the lesser tuberosity was debrided using a shaver and the subscapularis was repaired with a swivel lock anchor to the lesser tuberosity. Attention was then turned to the subacromial space. The arthroscope was brought into the subacromial space. A lateral portal as well as a posterior lateral portal were made under spinal needle localization. The supraspinatus rotator cuff tear was identified. The subacromial space was debrided and a complete subacromial bursectomy was performed. The supraspinatus rotator cuff tear was debrided and mobilized. The footprint of the greater tuberosity was also debrided and burred to bleeding bone. Through a stab incision off of the acromion edge 2 suture anchors were placed at the articular margin of the greater tuberosity.   The suture tails were passed through the supraspinatus rotator cuff tear, tied down, and brought down to 2 Separate Lateral Row anchors completing the rotator cuff repair. Final pictures were taken. All loose debris was removed. Scope instruments were removed. The portal sites were closed. A sterile dressing was placed over the left shoulder. The patient was awake from anesthesia, placed into a sling, extubated, and taken to PACU in stable condition with a plan for discharge home.      Electronically Signed Up   Abhi Dailey MD  04/12/21  1:07 PM

## 2021-04-12 NOTE — BRIEF OP NOTE
Brief Postoperative Note    Patient: Stanislav Amado  YOB: 1965  MRN: [id number]    Date of Procedure: 4/12/2021     Pre-Op Diagnosis: M75.122 LEFT ROTATOR CUFF TEAR  S46.102A BICEPS TEAR    Post-Op Diagnosis: Same as preoperative diagnosis. Procedure(s):  LEFT SHOULDER ARTHROSCOPIC ROTATOR CUFF REPAIR/GLENO-HUMERAL DEBRIDEMENT/BICEPS TENOTOMY/ARTHREX/SPIDER/TMAX    Surgeon(s):  Lakshmi Arrieta MD    Surgical Assistant: Physician Assistant: CHERELLE Tobar  Surg Asst-1: Severino Dailey    Anesthesia: General     Estimated Blood Loss (mL): Minimal    Complications: None    Specimens: * No specimens in log *     Implants:   Implant Name Type Inv. Item Serial No.  Lot No. LRB No. Used Action   ANCHOR C SWIVELOCK 4.75MM - SN/A  Kaylie Pea SWIVELOCK 4.75MM N/A [employer] [id number] Left 1 Implanted   ANCHOR SUT L14.7MM DIA5. 5MM BIOCOMPOSITE FULL THRD W/ 1.3MM - SN/A  ANCHOR SUT L14.7MM DIA5. 5MM BIOCOMPOSITE FULL THRD W/ 1.3MM N/A [employer] [id number] Left 1 Implanted   ANCHOR SUT L14.7MM DIA5. 5MM BIOCOMPOSITE FULL THRD W/ 1.3MM - SN/A  ANCHOR SUT L14.7MM DIA5. 5MM BIOCOMPOSITE FULL THRD W/ 1.3MM N/A [employer] [id number] Left 1 Implanted   ANCHOR C SWIVELOCK 4.75MM - SN/A  Tanya Siddhartha 4.75MM N/A Lilo Stovall [employer] [id number] Left 1 Implanted   ANCHOR C SWIVELOCK 4.75MM - SN/A  Kaylie Pea SWIVELOCK 4.75MM N/A [employer] [id number] Left 1 Implanted       Drains: * No LDAs found *    Findings: Left rotator cuff tear, supraspinatus and upper border subscapularis  Long head biceps tearing    Electronically Signed by Ton Bills MD on 4/12/2021 at 1:07 PM

## 2021-04-12 NOTE — DISCHARGE INSTRUCTIONS
DISCHARGE SUMMARY from Nurse    PATIENT INSTRUCTIONS:    After general anesthesia or intravenous sedation, for 24 hours or while taking prescription Narcotics:  · Limit your activities  · Do not drive and operate hazardous machinery  · Do not make important personal or business decisions  · Do  not drink alcoholic beverages  · If you have not urinated within 8 hours after discharge, please contact your surgeon on call. Report the following to your surgeon:  · Excessive pain, swelling, redness or odor of or around the surgical area  · Temperature over 100.5  · Nausea and vomiting lasting longer than 4 hours or if unable to take medications  · Any signs of decreased circulation or nerve impairment to extremity: change in color, persistent  numbness, tingling, coldness or increase pain  · Any questions    What to do at Home:  These are general instructions for a healthy lifestyle:    No smoking/ No tobacco products/ Avoid exposure to second hand smoke  Surgeon General's Warning:  Quitting smoking now greatly reduces serious risk to your health. Obesity, smoking, and sedentary lifestyle greatly increases your risk for illness    A healthy diet, regular physical exercise & weight monitoring are important for maintaining a healthy lifestyle    You may be retaining fluid if you have a history of heart failure or if you experience any of the following symptoms:  Weight gain of 3 pounds or more overnight or 5 pounds in a week, increased swelling in our hands or feet or shortness of breath while lying flat in bed. Please call your doctor as soon as you notice any of these symptoms; do not wait until your next office visit. The discharge information has been reviewed with the patient. The patient verbalized understanding. Discharge medications reviewed with the patient and appropriate educational materials and side effects teaching were provided.     Patient armband removed and shredded

## 2021-04-12 NOTE — ANESTHESIA PROCEDURE NOTES
Peripheral Block    Start time: 4/12/2021 10:14 AM  End time: 4/12/2021 10:20 AM  Performed by: Johnathan Lowe MD  Authorized by: Johnathan Lowe MD       Pre-procedure: Indications: at surgeon's request and post-op pain management    Preanesthetic Checklist: patient identified, risks and benefits discussed, site marked, timeout performed, anesthesia consent given and patient being monitored    Timeout Time: 10:14          Block Type:   Block Type:  Brachial plexus  Laterality:  Left  Monitoring:  Standard ASA monitoring, continuous pulse ox, responsive to questions, oxygen, frequent vital sign checks and heart rate  Injection Technique:  Single shot  Procedures: ultrasound guided    Patient Position: seated  Prep: chlorhexidine    Needle Type:  Ultraplex  Needle Gauge:  22 G  Needle Localization:  Ultrasound guidance  Medication Injected:  FentaNYL citrate (PF) injection sedation initial, 100 mcg  midazolam (VERSED) injection, 2 mg  ropivacaine (PF) (NAROPIN)(0.5%) 5 mg/mL injection, 30 mL  Med Admin Time: 4/12/2021 10:17 AM    Assessment:  Number of attempts:  1  Injection Assessment:  Incremental injection every 5 mL, negative aspiration for blood, local visualized surrounding nerve on ultrasound, no paresthesia, ultrasound image on chart and no intravascular symptoms  Patient tolerance:  Patient tolerated the procedure well with no immediate complications  For Vitals see nursing notes.      Rhiannon Chaparro MD  10:30 AM

## 2021-04-12 NOTE — ANESTHESIA POSTPROCEDURE EVALUATION
Procedure(s):  LEFT SHOULDER ARTHROSCOPIC ROTATOR CUFF REPAIR/GLENO-HUMERAL DEBRIDEMENT/BICEPS TENOTOMY/ARTHREX/SPIDER/TMAX.    general, regional    Anesthesia Post Evaluation      Multimodal analgesia: multimodal analgesia used between 6 hours prior to anesthesia start to PACU discharge  Patient location during evaluation: bedside  Patient participation: complete - patient participated  Level of consciousness: awake  Pain score: 0  Pain management: adequate  Airway patency: patent  Anesthetic complications: no  Cardiovascular status: stable  Respiratory status: acceptable  Hydration status: acceptable  Post anesthesia nausea and vomiting:  none  Final Post Anesthesia Temperature Assessment:  Normothermia (36.0-37.5 degrees C)      INITIAL Post-op Vital signs:   Vitals Value Taken Time   /84 04/12/21 1356   Temp 37.1 °C (98.8 °F) 04/12/21 1304   Pulse 72 04/12/21 1357   Resp 21 04/12/21 1357   SpO2 90 % 04/12/21 1357   Vitals shown include unvalidated device data.

## 2021-04-12 NOTE — ANESTHESIA PREPROCEDURE EVALUATION
Relevant Problems   No relevant active problems       Anesthetic History   No history of anesthetic complications            Review of Systems / Medical History  Patient summary reviewed and pertinent labs reviewed    Pulmonary            Asthma : well controlled       Neuro/Psych   Within defined limits           Cardiovascular  Within defined limits                Exercise tolerance: >4 METS     GI/Hepatic/Renal  Within defined limits              Endo/Other        Morbid obesity     Other Findings              Physical Exam    Airway  Mallampati: I  TM Distance: 4 - 6 cm  Neck ROM: normal range of motion   Mouth opening: Normal     Cardiovascular  Regular rate and rhythm,  S1 and S2 normal,  no murmur, click, rub, or gallop  Rhythm: regular  Rate: normal         Dental    Dentition: Lower dentition intact and Upper dentition intact  Comments: Broken L lower molar   Pulmonary  Breath sounds clear to auscultation               Abdominal  GI exam deferred       Other Findings            Anesthetic Plan    ASA: 2  Anesthesia type: general and regional - interscalene block          Induction: Intravenous  Anesthetic plan and risks discussed with: Patient

## 2021-04-12 NOTE — INTERVAL H&P NOTE
Update History & Physical 
 
The Patient's History and Physical of April 2, 2021 was reviewed with the patient and I examined the patient. There was no change. The surgical site was confirmed by the patient and me. Plan:  The risk, benefits, expected outcome, and alternative to the recommended procedure have been discussed with the patient. Patient understands and wants to proceed with the procedure.  
 
Electronically signed by Akbar Gallegos MD on 4/12/2021 at 10:11 AM

## 2021-04-12 NOTE — PERIOP NOTES
Assumed care of pt from OR via stretcher. Attached to monitor. VSS. OR, MAR and anesthesia report appreciated. Will monitor.

## 2021-04-13 ENCOUNTER — TELEPHONE (OUTPATIENT)
Dept: ORTHOPEDIC SURGERY | Age: 56
End: 2021-04-13

## 2021-04-13 VITALS
RESPIRATION RATE: 20 BRPM | TEMPERATURE: 97.9 F | BODY MASS INDEX: 68.13 KG/M2 | SYSTOLIC BLOOD PRESSURE: 124 MMHG | HEART RATE: 82 BPM | WEIGHT: 293 LBS | DIASTOLIC BLOOD PRESSURE: 64 MMHG | OXYGEN SATURATION: 99 %

## 2021-04-13 DIAGNOSIS — G89.18 POST-OP PAIN: Primary | ICD-10-CM

## 2021-04-13 RX ORDER — OXYCODONE HYDROCHLORIDE 10 MG/1
10 TABLET ORAL
Qty: 40 TAB | Refills: 0 | Status: SHIPPED | OUTPATIENT
Start: 2021-04-13 | End: 2021-04-20

## 2021-04-13 NOTE — ED PROVIDER NOTES
59-year-old female with past medical history significant for asthma  and a left shoulder rotator cuff tear for which she had operative repair earlier today presents to the ED with complaint of shortness of breath. She notes that several hours after completion of her surgery she started to feel acutely short of breath and had a self-limited episode of chest pain which has now resolved. Of note, she received a left-sided brachial plexus block for the surgery. She denies any fever, chills, nausea, vomiting, abdominal pain, urinary symptoms or any other complaints. She denies smoking, drinking or recreational drug use. Family history reviewed and noncontributory.            Past Medical History:   Diagnosis Date    Adverse effect of anesthesia     \"Epidural\" allergy    Asthma     Chronic pain     Bilateral knees    Ill-defined condition     anemia       Past Surgical History:   Procedure Laterality Date    HX  SECTION           Family History:   Problem Relation Age of Onset    Cancer Mother     Diabetes Mother     Hypertension Mother     Cancer Father     No Known Problems Brother        Social History     Socioeconomic History    Marital status:      Spouse name: Not on file    Number of children: Not on file    Years of education: Not on file    Highest education level: Not on file   Occupational History    Not on file   Social Needs    Financial resource strain: Not on file    Food insecurity     Worry: Not on file     Inability: Not on file    Transportation needs     Medical: Not on file     Non-medical: Not on file   Tobacco Use    Smoking status: Never Smoker    Smokeless tobacco: Never Used   Substance and Sexual Activity    Alcohol use: No    Drug use: Never    Sexual activity: Not on file   Lifestyle    Physical activity     Days per week: Not on file     Minutes per session: Not on file    Stress: Not on file   Relationships    Social connections     Talks on phone: Not on file     Gets together: Not on file     Attends [employer] service: Not on file     Active member of club or organization: Not on file     Attends meetings of clubs or organizations: Not on file     Relationship status: Not on file    Intimate partner violence     Fear of current or ex partner: Not on file     Emotionally abused: Not on file     Physically abused: Not on file     Forced sexual activity: Not on file   Other Topics Concern    Not on file   Social History Narrative    ** Merged History Encounter **              ALLERGIES: Other medication and Tetanus and diphtheria toxoids    Review of Systems   Constitutional: Negative for activity change and appetite change. HENT: Negative for drooling and facial swelling. Eyes: Negative for pain and discharge. Respiratory: Positive for shortness of breath. Negative for apnea and choking. Cardiovascular: Positive for chest pain. Negative for palpitations and leg swelling. Gastrointestinal: Negative for blood in stool and rectal pain. Endocrine: Negative for polydipsia and polyphagia. Genitourinary: Negative for genital sores and hematuria. Musculoskeletal: Negative for gait problem and neck stiffness. Skin: Negative for color change and rash. Allergic/Immunologic: Negative for environmental allergies. Neurological: Negative for tremors. Hematological: Negative for adenopathy. Psychiatric/Behavioral: Negative for agitation and behavioral problems. Vitals:    04/12/21 2032   BP: (!) 139/104   Pulse: 87   Resp: 19   Temp: 97.9 °F (36.6 °C)   SpO2: 96%   Weight: (!) 197.3 kg (435 lb)            Physical Exam  Vitals signs and nursing note reviewed. Constitutional:       Appearance: Normal appearance. She is obese. HENT:      Head: Normocephalic and atraumatic. Mouth/Throat:      Mouth: Mucous membranes are moist.   Eyes:      Extraocular Movements: Extraocular movements intact.       Pupils: Pupils are equal, round, and reactive to light. Cardiovascular:      Rate and Rhythm: Normal rate and regular rhythm. Heart sounds: Normal heart sounds. No murmur. No friction rub. Pulmonary:      Effort: Pulmonary effort is normal. No tachypnea. Breath sounds: Normal breath sounds. No wheezing. Comments: Patient able to speak in full sentences without difficulty. Abdominal:      Palpations: Abdomen is soft. Musculoskeletal: Normal range of motion. Skin:     General: Skin is warm and dry. Capillary Refill: Capillary refill takes less than 2 seconds. Neurological:      General: No focal deficit present. Mental Status: She is alert and oriented to person, place, and time. Comments: Left shoulder with surgical dressing in place. Patient states the left upper extremity is still entirely numb from the nerve block. Palpable 2+ radial pulse. Psychiatric:         Mood and Affect: Mood normal.          MDM  Number of Diagnoses or Management Options  Diagnosis management comments: 59-year-old female who recently underwent left shoulder rotator cuff tear repair and underwent a left brachial plexus nerve block earlier today presents to the ED with complaint of shortness of breath. Concern for possible neuropathy secondary to nerve block. Given that she also had a complaint of chest pain will perform ACS work-up. She is not hypoxic and has normal vital signs. Will reassess. EKG is normal sinus rhythm at a rate of 80 with normal intervals, no ST elevations or depressions, no T wave inversions as interpreted by me. Laboratory work-up shows a chronic anemia that is not significantly changed from baseline. Remainder of laboratory work-up unremarkable. Troponin negative. Chest x-ray negative for any acute pathology as interpreted by me.   Upon reevaluation, patient states that she has regained full sensation and use of her left arm and states that since the anesthesia has worn off her breathing has completely returned to normal.  She was given strict return precautions and is stable for discharge home. Pt has been reexamined. Patient has no new complaints, changes, or physical findings. Care plan outlined and precautions discussed. Results were reviewed with the patient. All medications were reviewed with the patient; will d/c home with PMD f/u. All of pt's questions and concerns were addressed. Patient was instructed and agrees to follow up with PMD, as well as to return to the ED upon further deterioration. Patient is ready to go home. This note was dictated utilizing voice recognition software which may lead to typographical errors.  I apologize in advance if the situation occurs.  If questions arise please do not hesitate to contact me or call our department.     Merricktrudy Geiger, DO             Procedures

## 2021-04-13 NOTE — ED NOTES
Patient states she has feeling coming back to the fingers. States the thumb is still numb.      Pt states her breathing is getting better as her arm is getting less numb

## 2021-04-13 NOTE — TELEPHONE ENCOUNTER
Received call from patients , Nikia Garrido. He states the pharmacy didn't have the Oxycodone medication in stock. I called the pharmacy, and they state they do in fact have the medication, and are filling the rx. I relayed this to Nikia Garrido.

## 2021-04-13 NOTE — TELEPHONE ENCOUNTER
One time rx for something stronger sent to her pharmacy. She can stop the current medication and take this one. 1-2 tablets every 4 hours. She can also take OTC tylenol for breakthrough pain. She needs to ice and make sure her sling is supporting her arm these things will help with pain. I would not recommend a muscle relaxer at this time.

## 2021-04-13 NOTE — ED NOTES
Discharge instructions reviewed with pt. Pt voiced understanding. Pt assisted to wheelchair and taken to vehicle. Pt able to get in vehicle with no assistance.

## 2021-04-13 NOTE — TELEPHONE ENCOUNTER
Patient's  called requesting a call back in regards to the previous message that was put in.  He stated his wife is unable to talk due to her unbearable pain    Please advise patient's  for this matter at [phone]

## 2021-04-13 NOTE — TELEPHONE ENCOUNTER
Patient called to state after her previous surgery on 4/12, she was taken to the emergency department for severe pain with shortness of breath. She stated that the pain medication prescribed after yesterday's surgery is not effective and is requesting a call back to discuss what steps she can take to ease discomfort and also requests a new medication for pain.      Please advise patient at [phone]

## 2021-04-13 NOTE — ED TRIAGE NOTES
Patient had shortness of breath and cough this afternoon after arriving home from rotator cuff surgery on left arm.    Pt alert,  Some non productive cough noted

## 2021-04-13 NOTE — TELEPHONE ENCOUNTER
Called and spoke to patient's . Made him aware that we sent in a one time rx for Oxycodone. Patients  was agitated and wondered why we wouldn't consider Percocet. I advised that the medication we prescribed was Percocet just without Tylenol. Patient's  also inquired about the muscle relaxer, which I informed was denied. Patients  states that if this increase in pain med doesn't help he will just call in again. States we have to take into consideration the pain the patient is in. I advised that since the patient had surgery yesterday 4/12, the pain isn't muscle related. Patient's  was unhappy with not getting the muscle relaxer.

## 2021-04-13 NOTE — TELEPHONE ENCOUNTER
She can take 2 tablets every 4 hours for pain. The first couple days are the worst. Ice and wear the sling so it supports her shoulder to help with pain also.

## 2021-04-14 LAB
ATRIAL RATE: 80 BPM
CALCULATED P AXIS, ECG09: 42 DEGREES
CALCULATED R AXIS, ECG10: 5 DEGREES
CALCULATED T AXIS, ECG11: 40 DEGREES
DIAGNOSIS, 93000: NORMAL
P-R INTERVAL, ECG05: 184 MS
Q-T INTERVAL, ECG07: 382 MS
QRS DURATION, ECG06: 90 MS
QTC CALCULATION (BEZET), ECG08: 440 MS
VENTRICULAR RATE, ECG03: 80 BPM

## 2021-04-21 ENCOUNTER — OFFICE VISIT (OUTPATIENT)
Dept: ORTHOPEDIC SURGERY | Age: 56
End: 2021-04-21
Payer: COMMERCIAL

## 2021-04-21 VITALS
TEMPERATURE: 97.5 F | HEIGHT: 67 IN | OXYGEN SATURATION: 95 % | HEART RATE: 94 BPM | RESPIRATION RATE: 15 BRPM | WEIGHT: 293 LBS | BODY MASS INDEX: 45.99 KG/M2

## 2021-04-21 DIAGNOSIS — Z98.890 STATUS POST ARTHROSCOPY OF LEFT SHOULDER: ICD-10-CM

## 2021-04-21 DIAGNOSIS — G89.18 POST-OP PAIN: ICD-10-CM

## 2021-04-21 DIAGNOSIS — M75.122 NONTRAUMATIC COMPLETE TEAR OF LEFT ROTATOR CUFF: Primary | ICD-10-CM

## 2021-04-21 DIAGNOSIS — S46.102A INJURY OF TENDON OF LONG HEAD OF LEFT BICEPS, INITIAL ENCOUNTER: ICD-10-CM

## 2021-04-21 PROCEDURE — 99024 POSTOP FOLLOW-UP VISIT: CPT | Performed by: ORTHOPAEDIC SURGERY

## 2021-04-21 RX ORDER — CYCLOBENZAPRINE HCL 10 MG
10 TABLET ORAL 2 TIMES DAILY
Qty: 60 TAB | Refills: 0 | Status: SHIPPED | OUTPATIENT
Start: 2021-04-21 | End: 2021-06-01

## 2021-04-21 RX ORDER — TRAMADOL HYDROCHLORIDE 50 MG/1
50 TABLET ORAL EVERY 4 HOURS
Qty: 60 TAB | Refills: 0 | Status: SHIPPED | OUTPATIENT
Start: 2021-04-21 | End: 2021-05-01

## 2021-04-21 NOTE — PROGRESS NOTES
Hanane Villalobos  1965     HISTORY OF PRESENT ILLNESS  Hanane Villalobos is a 64 y.o. female who presents today for evaluation s/p Left shoulder arthroscopic rotator cuff repair, upper border subscapularis and supraspinatus, glenohumeral joint debridement on 4/12/21. Patient has not been going to PT. Describes pain as a 8/10. Has been taking oxycodone for pain. Still has night pain. She has been compliant with her sling and restrictions. She is having some acute left leg swelling for the last couple days. She does have a history of this and reports she takes her husbands lasix when she does. Patient denies any fever, chills, chest pain, shortness of breath or calf pain. There are no new illness or injuries to report since last seen in the office. PHYSICAL EXAM:   Visit Vitals  Pulse 94   Temp 97.5 °F (36.4 °C)   Resp 15   Ht 5' 7\" (1.702 m)   Wt (!) 453 lb (205.5 kg)   SpO2 95%   BMI 70.95 kg/m²      The patient is a well-developed, well-nourished female in no acute distress. The patient is alert and oriented times three. The patient appears to be well groomed. Mood and affect are normal.  ORTHOPEDIC EXAM of left shoulder:  Inspection: swelling present,  Bruising present  Incision, clean, dry, intact, sutures in place  Passive glenohumeral abduction 0-20 degrees otherwise not assessed  Stability: Stable  Strength: n/a  2+ distal pulses    IMPRESSION:  s/p Left shoulder arthroscopic rotator cuff repair, upper border subscapularis and supraspinatus, glenohumeral joint debridement    PLAN:   Pt doing well post operatively  Incisions cleaned. Sutures removed and steri strips applied  Surgery was discussed at length today. I have recommended she elevate and wear compression stockings to help with swelling at this time. This will likely get better as she gets up and moving. Continue wearing sling until 4 weeks post op. Will start PT and exercises at next visit.   Stressed to patient that nothing causes an increase in pain. She was also given a prescription for flexeril to help with muscle spasms  Pt given a refill of pain medication today. Tramadol 50 mg Patient given pain medication for short term acute pain relief. Goal is to treat patient according to above plan and to ultimately have patient off all pain medications once appropriate. If chronic pain management is required beyond what is expected for current orthopedic problem, will refer patient to pain management.   was reviewed and will be reviewed with every medication refill request.   RTC 3 weeks    Andrade Norris [facility]

## 2021-04-23 ENCOUNTER — DOCUMENTATION ONLY (OUTPATIENT)
Dept: ORTHOPEDIC SURGERY | Age: 56
End: 2021-04-23

## 2021-04-23 ENCOUNTER — TELEPHONE (OUTPATIENT)
Dept: ORTHOPEDIC SURGERY | Age: 56
End: 2021-04-23

## 2021-04-23 NOTE — PROGRESS NOTES
Patient's daughter dropped off a [employer] medical request form to be completed. Patient's daughter is aware of the 7-10 business day and form fee policy.  Please advise patient at [phone] form placed in the forms bin at [facility]

## 2021-04-26 ENCOUNTER — APPOINTMENT (OUTPATIENT)
Dept: VASCULAR SURGERY | Age: 56
End: 2021-04-26
Attending: STUDENT IN AN ORGANIZED HEALTH CARE EDUCATION/TRAINING PROGRAM
Payer: COMMERCIAL

## 2021-04-26 ENCOUNTER — HOSPITAL ENCOUNTER (EMERGENCY)
Dept: NUCLEAR MEDICINE | Age: 56
Discharge: HOME | End: 2021-04-26
Attending: STUDENT IN AN ORGANIZED HEALTH CARE EDUCATION/TRAINING PROGRAM
Payer: COMMERCIAL

## 2021-04-26 ENCOUNTER — APPOINTMENT (OUTPATIENT)
Dept: CT IMAGING | Age: 56
End: 2021-04-26
Attending: STUDENT IN AN ORGANIZED HEALTH CARE EDUCATION/TRAINING PROGRAM
Payer: COMMERCIAL

## 2021-04-26 ENCOUNTER — APPOINTMENT (OUTPATIENT)
Dept: GENERAL RADIOLOGY | Age: 56
End: 2021-04-26
Attending: STUDENT IN AN ORGANIZED HEALTH CARE EDUCATION/TRAINING PROGRAM
Payer: COMMERCIAL

## 2021-04-26 ENCOUNTER — HOSPITAL ENCOUNTER (EMERGENCY)
Age: 56
Discharge: HOME | End: 2021-04-27
Attending: EMERGENCY MEDICINE
Payer: COMMERCIAL

## 2021-04-26 DIAGNOSIS — I26.99 SUBACUTE PULMONARY EMBOLISM (HCC): Primary | ICD-10-CM

## 2021-04-26 LAB
ALBUMIN SERPL-MCNC: 3 G/DL (ref 3.4–5)
ALBUMIN/GLOB SERPL: 0.8 {RATIO} (ref 0.8–1.7)
ALP SERPL-CCNC: 66 U/L (ref 45–117)
ALT SERPL-CCNC: 17 U/L (ref 13–56)
ANION GAP SERPL CALC-SCNC: 4 MMOL/L (ref 3–18)
APTT PPP: 32.8 SEC (ref 23–36.4)
AST SERPL-CCNC: 16 U/L (ref 10–38)
BASOPHILS # BLD: 0 K/UL (ref 0–0.1)
BASOPHILS NFR BLD: 1 % (ref 0–2)
BILIRUB SERPL-MCNC: 0.2 MG/DL (ref 0.2–1)
BUN SERPL-MCNC: 18 MG/DL (ref 7–18)
BUN/CREAT SERPL: 27 (ref 12–20)
CALCIUM SERPL-MCNC: 8.8 MG/DL (ref 8.5–10.1)
CHLORIDE SERPL-SCNC: 104 MMOL/L (ref 100–111)
CO2 SERPL-SCNC: 32 MMOL/L (ref 21–32)
CREAT SERPL-MCNC: 0.67 MG/DL (ref 0.6–1.3)
D DIMER PPP FEU-MCNC: 10.07 UG/ML(FEU)
DIFFERENTIAL METHOD BLD: ABNORMAL
EOSINOPHIL # BLD: 0.2 K/UL (ref 0–0.4)
EOSINOPHIL NFR BLD: 5 % (ref 0–5)
ERYTHROCYTE [DISTWIDTH] IN BLOOD BY AUTOMATED COUNT: 15.8 % (ref 11.6–14.5)
GLOBULIN SER CALC-MCNC: 3.9 G/DL (ref 2–4)
GLUCOSE SERPL-MCNC: 78 MG/DL (ref 74–99)
HCT VFR BLD AUTO: 33.4 % (ref 35–45)
HGB BLD-MCNC: 10.5 G/DL (ref 12–16)
INR PPP: 1.1 (ref 0.8–1.2)
LYMPHOCYTES # BLD: 1.1 K/UL (ref 0.9–3.6)
LYMPHOCYTES NFR BLD: 26 % (ref 21–52)
MCH RBC QN AUTO: 26.1 PG (ref 24–34)
MCHC RBC AUTO-ENTMCNC: 31.4 G/DL (ref 31–37)
MCV RBC AUTO: 83.1 FL (ref 74–97)
MONOCYTES # BLD: 0.5 K/UL (ref 0.05–1.2)
MONOCYTES NFR BLD: 12 % (ref 3–10)
NEUTS SEG # BLD: 2.3 K/UL (ref 1.8–8)
NEUTS SEG NFR BLD: 56 % (ref 40–73)
PLATELET # BLD AUTO: 273 K/UL (ref 135–420)
PMV BLD AUTO: 9.4 FL (ref 9.2–11.8)
POTASSIUM SERPL-SCNC: 4.3 MMOL/L (ref 3.5–5.5)
PROT SERPL-MCNC: 6.9 G/DL (ref 6.4–8.2)
PROTHROMBIN TIME: 14.3 SEC (ref 11.5–15.2)
RBC # BLD AUTO: 4.02 M/UL (ref 4.2–5.3)
SODIUM SERPL-SCNC: 140 MMOL/L (ref 136–145)
WBC # BLD AUTO: 4.1 K/UL (ref 4.6–13.2)

## 2021-04-26 PROCEDURE — 71046 X-RAY EXAM CHEST 2 VIEWS: CPT

## 2021-04-26 PROCEDURE — 93005 ELECTROCARDIOGRAM TRACING: CPT

## 2021-04-26 PROCEDURE — 82553 CREATINE MB FRACTION: CPT

## 2021-04-26 PROCEDURE — 80053 COMPREHEN METABOLIC PANEL: CPT

## 2021-04-26 PROCEDURE — 85379 FIBRIN DEGRADATION QUANT: CPT

## 2021-04-26 PROCEDURE — 99282 EMERGENCY DEPT VISIT SF MDM: CPT

## 2021-04-26 PROCEDURE — 85025 COMPLETE CBC W/AUTO DIFF WBC: CPT

## 2021-04-26 PROCEDURE — 85610 PROTHROMBIN TIME: CPT

## 2021-04-26 PROCEDURE — 85730 THROMBOPLASTIN TIME PARTIAL: CPT

## 2021-04-26 PROCEDURE — 93970 EXTREMITY STUDY: CPT

## 2021-04-26 NOTE — ED PROVIDER NOTES
EMERGENCY DEPARTMENT HISTORY AND PHYSICAL EXAM    5:00 PM    Date: 4/26/2021  Patient Name: Samira Duran    History of Presenting Illness     Chief Complaint   Patient presents with    Leg Swelling       History Provided By: Patient  Location/Duration/Severity/Modifying factors   Patient is a 68-year-old female with past medical history significant for lymphedema, shoulder surgery on April 12 and chronic pain who presents with 3-day history of worsening bilateral leg swelling and shortness of breath. Patient reports that she has chronic lymphedema of both lower limbs. And experiences increase in swelling after working on her feet all day that usually resolves when putting her feet up. However, over the last week she is experienced worsening bilateral lower limb edema left greater than right with moderate generalized pain bilateral lower limbs. Additionally over the last 2 to 3 days she has experienced intermittent shortness of breath. Since her shoulder surgery on the 12th she has had very little activity, mostly remaining in bed and only getting up to go to the bathroom. Takes Tramadol, tylenol, flexeril. No chronic meds. Denies HTN, heart disease and diabetes      PCP: Karina Lopez MD    Current Facility-Administered Medications   Medication Dose Route Frequency Provider Last Rate Last Admin    iopamidoL (ISOVUE-370) 76 % injection 100 mL  100 mL IntraVENous RAD ONCE Elvis Mccallum, DO         Current Outpatient Medications   Medication Sig Dispense Refill    traMADoL (ULTRAM) 50 mg tablet Take 1 Tab by mouth every four (4) hours for 10 days. Max Daily Amount: 300 mg. 60 Tab 0    cyclobenzaprine (FLEXERIL) 10 mg tablet Take 1 Tab by mouth two (2) times a day. Indications: muscle spasm 60 Tab 0    multivitamin (ONE A DAY) tablet Take 1 Tab by mouth daily.  cyanocobalamin, vitamin B-12, (Vitamin B-12) 5,000 mcg subl 5,000 mcg by SubLINGual route daily.  Dissolvable      acetaminophen (Tylenol Extra Strength) 500 mg tablet Take  by mouth every six (6) hours as needed for Pain. Past History     Past Medical History:  Past Medical History:   Diagnosis Date    Adverse effect of anesthesia     \"Epidural\" allergy    Asthma     Chronic pain     Bilateral knees    Ill-defined condition     anemia    Lymphedema        Past Surgical History:  Past Surgical History:   Procedure Laterality Date    HX  SECTION      HX ROTATOR CUFF REPAIR Left 2021       Family History:  Family History   Problem Relation Age of Onset    Cancer Mother     Diabetes Mother     Hypertension Mother     Cancer Father     No Known Problems Brother        Social History:  Social History     Tobacco Use    Smoking status: Never Smoker    Smokeless tobacco: Never Used   Substance Use Topics    Alcohol use: No    Drug use: Never       Allergies: Allergies   Allergen Reactions    Other Medication Itching     Patient states \"Epidural\"     Tetanus And Diphtheria Toxoids Hives and Nausea and Vomiting         Review of Systems     Review of Systems   Constitutional: Positive for activity change. Negative for appetite change, chills, diaphoresis, fatigue and fever. HENT: Negative for congestion, hearing loss, postnasal drip, rhinorrhea, sinus pressure, sinus pain and sore throat. Eyes: Negative for pain and visual disturbance. Respiratory: Positive for shortness of breath. Negative for cough and wheezing. Cardiovascular: Positive for chest pain and palpitations. Gastrointestinal: Negative for abdominal distention, abdominal pain, constipation, diarrhea, nausea and vomiting. Endocrine: Negative. Genitourinary: Negative. Skin: Negative. Hematological: Negative. Psychiatric/Behavioral: Negative.         Physical Exam     Visit Vitals  /77 (BP 1 Location: Right upper arm, BP Patient Position: At rest)   Pulse 74   Temp 98.3 °F (36.8 °C)   Resp 16   Ht 5' 7\" (1.702 m)   Wt (!) 186 kg (410 lb)   SpO2 95%   BMI 64.22 kg/m²       Physical Exam  Constitutional:       General: She is not in acute distress. Appearance: Normal appearance. She is obese. She is not ill-appearing, toxic-appearing or diaphoretic. HENT:      Head: Normocephalic and atraumatic. Mouth/Throat:      Mouth: Mucous membranes are moist.      Pharynx: Oropharynx is clear. No oropharyngeal exudate or posterior oropharyngeal erythema. Eyes:      Extraocular Movements: Extraocular movements intact. Conjunctiva/sclera: Conjunctivae normal.      Pupils: Pupils are equal, round, and reactive to light. Neck:      Musculoskeletal: Normal range of motion. Cardiovascular:      Rate and Rhythm: Normal rate and regular rhythm. Pulses: Normal pulses. Heart sounds: Normal heart sounds. No murmur. No friction rub. No gallop. Pulmonary:      Effort: Pulmonary effort is normal. No respiratory distress. Breath sounds: Normal breath sounds. No wheezing, rhonchi or rales. Abdominal:      General: There is no distension. Palpations: Abdomen is soft. Tenderness: There is no abdominal tenderness. There is no guarding. Musculoskeletal: Normal range of motion. General: Swelling and tenderness present. Right lower leg: Edema present. Left lower leg: Edema present. Skin:     General: Skin is warm and dry. Capillary Refill: Capillary refill takes less than 2 seconds. Neurological:      General: No focal deficit present. Mental Status: She is alert and oriented to person, place, and time. Psychiatric:         Mood and Affect: Mood normal.         Behavior: Behavior normal.         Thought Content:  Thought content normal.         Judgment: Judgment normal.         Diagnostic Study Results     Labs -  Recent Results (from the past 12 hour(s))   EKG, 12 LEAD, INITIAL    Collection Time: 04/26/21  5:14 PM   Result Value Ref Range    Ventricular Rate 77 BPM    Atrial Rate 77 BPM    P-R Interval 178 ms    QRS Duration 96 ms    Q-T Interval 382 ms    QTC Calculation (Bezet) 432 ms    Calculated P Axis 51 degrees    Calculated R Axis 42 degrees    Calculated T Axis 55 degrees    Diagnosis       Normal sinus rhythm  Normal ECG  When compared with ECG of 12-APR-2021 20:23,  No significant change was found     CBC WITH AUTOMATED DIFF    Collection Time: 04/26/21  5:18 PM   Result Value Ref Range    WBC 4.1 (L) 4.6 - 13.2 K/uL    RBC 4.02 (L) 4.20 - 5.30 M/uL    HGB 10.5 (L) 12.0 - 16.0 g/dL    HCT 33.4 (L) 35.0 - 45.0 %    MCV 83.1 74.0 - 97.0 FL    MCH 26.1 24.0 - 34.0 PG    MCHC 31.4 31.0 - 37.0 g/dL    RDW 15.8 (H) 11.6 - 14.5 %    PLATELET 875 437 - 737 K/uL    MPV 9.4 9.2 - 11.8 FL    NEUTROPHILS 56 40 - 73 %    LYMPHOCYTES 26 21 - 52 %    MONOCYTES 12 (H) 3 - 10 %    EOSINOPHILS 5 0 - 5 %    BASOPHILS 1 0 - 2 %    ABS. NEUTROPHILS 2.3 1.8 - 8.0 K/UL    ABS. LYMPHOCYTES 1.1 0.9 - 3.6 K/UL    ABS. MONOCYTES 0.5 0.05 - 1.2 K/UL    ABS. EOSINOPHILS 0.2 0.0 - 0.4 K/UL    ABS. BASOPHILS 0.0 0.0 - 0.1 K/UL    DF AUTOMATED     METABOLIC PANEL, COMPREHENSIVE    Collection Time: 04/26/21  5:18 PM   Result Value Ref Range    Sodium 140 136 - 145 mmol/L    Potassium 4.3 3.5 - 5.5 mmol/L    Chloride 104 100 - 111 mmol/L    CO2 32 21 - 32 mmol/L    Anion gap 4 3.0 - 18 mmol/L    Glucose 78 74 - 99 mg/dL    BUN 18 7.0 - 18 MG/DL    Creatinine 0.67 0.6 - 1.3 MG/DL    BUN/Creatinine ratio 27 (H) 12 - 20      GFR est AA >60 >60 ml/min/1.73m2    GFR est non-AA >60 >60 ml/min/1.73m2    Calcium 8.8 8.5 - 10.1 MG/DL    Bilirubin, total 0.2 0.2 - 1.0 MG/DL    ALT (SGPT) 17 13 - 56 U/L    AST (SGOT) 16 10 - 38 U/L    Alk.  phosphatase 66 45 - 117 U/L    Protein, total 6.9 6.4 - 8.2 g/dL    Albumin 3.0 (L) 3.4 - 5.0 g/dL    Globulin 3.9 2.0 - 4.0 g/dL    A-G Ratio 0.8 0.8 - 1.7     D DIMER    Collection Time: 04/26/21  5:18 PM   Result Value Ref Range    D DIMER 10.07 (H) <0.46 ug/ml(FEU) PROTHROMBIN TIME + INR    Collection Time: 04/26/21  5:18 PM   Result Value Ref Range    Prothrombin time 14.3 11.5 - 15.2 sec    INR 1.1 0.8 - 1.2     PTT    Collection Time: 04/26/21  5:18 PM   Result Value Ref Range    aPTT 32.8 23.0 - 36.4 SEC   CARDIAC PANEL,(CK, CKMB & TROPONIN)    Collection Time: 04/26/21  5:18 PM   Result Value Ref Range    CK - MB <1.0 <3.6 ng/ml    CK-MB Index  0.0 - 4.0 %     CALCULATION NOT PERFORMED WHEN RESULT IS BELOW LINEAR LIMIT    CK 84 26 - 192 U/L    Troponin-I, QT <0.02 0.0 - 0.045 NG/ML       Radiologic Studies -   NM LUNG SCAN PERF   Final Result      Low to intermediate probability scan. XR CHEST PA LAT    (Results Pending)       Medical Decision Making   I am the first provider for this patient. I reviewed the vital signs, available nursing notes, past medical history, past surgical history, family history and social history. Vital Signs-Reviewed the patient's vital signs. Records Reviewed: Nursing Notes (Time of Review: 5:00 PM)    ED Course: Progress Notes, Reevaluation, and Consults:     Initial evaluation complete. With recent surgery and bedrest, worsening bilateral lower limb edema left greater than right, and new onset shortness of breath in the last 3 days. High clinical concern for DVT and PE. Patient to be evaluated with CBC, D-dimer, CMP, bilateral lower extremity Doppler, and CTA. Patient with D-dimer of 10.07. Bilateral PVLs with exam limited by patient body habitus not indicative of DVT. CTA attempted, however machine unable to support patient's weight. VQ scan ordered and transported to [facility] for nuclear medicine capability arranged. Provider Notes (Medical Decision Making):   MDM  Number of Diagnoses or Management Options  Diagnosis management comments: Patient is a 29-year-old female who presents with worsening of bilateral lower limb edema and shortness of breath in the setting of recent shoulder surgery and decrease in activity. Work-up significant for D-dimer of 10.07 and V/Q scan with a low to intermediate probability for PE. Patient's cardiac panel within normal limit. Patient's vital signs have been stable throughout ED presentation without tachycardia, tachypnea, or hypoxia. EKG shows normal sinus rhythm without signs of right heart strain or signs consistent with significant PE. Given clinical picture and history patient likely has subacute PE, which can be treated with therapeutic dose Eliquis in the outpatient setting. Patient will be given initial dose of Eliquis prior to discharge, prescribed Eliquis for home use, and instructed to follow-up with outpatient provider within 48 hours. Patient given strict return precautions, she verbalized understanding of. Patient stable for discharge from ED with close PCP follow-up. Amount and/or Complexity of Data Reviewed  Clinical lab tests: ordered and reviewed  Tests in the radiology section of CPT®: ordered and reviewed  Obtain history from someone other than the patient: yes  Discuss the patient with other providers: yes  Independent visualization of images, tracings, or specimens: yes    Critical Care  Total time providing critical care:  minutes        Diagnosis     Clinical Impression:   1. Subacute pulmonary embolism (HCC)        Disposition: With close PCP follow-up. Follow-up Information     Follow up With Specialties Details Why Contact Info    Vipul Malave MD [facility] follow up [address] [facility]  [phone]             Patient's Medications   Start Taking    No medications on file   Continue Taking    ACETAMINOPHEN (TYLENOL EXTRA STRENGTH) 500 MG TABLET    Take  by mouth every six (6) hours as needed for Pain. CYANOCOBALAMIN, VITAMIN B-12, (VITAMIN B-12) 5,000 MCG SUBL    5,000 mcg by SubLINGual route daily.  Dissolvable    CYCLOBENZAPRINE (FLEXERIL) 10 MG TABLET    Take 1 Tab by mouth two (2) times a day. Indications: muscle spasm    MULTIVITAMIN (ONE A DAY) TABLET    Take 1 Tab by mouth daily. TRAMADOL (ULTRAM) 50 MG TABLET    Take 1 Tab by mouth every four (4) hours for 10 days. Max Daily Amount: 300 mg. These Medications have changed    No medications on file   Stop Taking    CYCLOBENZAPRINE (FLEXERIL) 5 MG TABLET    Take 1-2 Tabs by mouth nightly as needed (pain). IBUPROFEN (MOTRIN) 800 MG TABLET    1 tablet by mouth every 8 hours as needed for pain    ONDANSETRON HCL (ZOFRAN) 4 MG TABLET    Take 1 Tab by mouth every eight (8) hours as needed for Nausea or Vomiting. Corinne Verdugo MD, PGY-1   [facility]   April 27, 2021, 5:00 PM     Case discussed with Dr. Isa Santacruz (hospitalist). He refuse admission. He states patient is not a candidate for admission. Patient is to be treated as an outpatient with eliquis and F/U with PCP. Scribe Attestation:   [employer] as a scribe for and in the presence of Dr. Enrrique Beard att. providers found April 27, 2021 at 5:55 AM     Signed by: Henrik Johnson, [address] [place] [address], April 27, 2021, 5:55 AM    Provider Attestation:   I personally performed the services described in the documentation, reviewed the documentation, as recorded by the scribe in my presence, and it accurately and completely records my words and actions.      Reviewed and signed by:  Dr. Enrrique Beard att. providers found

## 2021-04-27 VITALS
BODY MASS INDEX: 45.99 KG/M2 | WEIGHT: 293 LBS | HEIGHT: 67 IN | RESPIRATION RATE: 16 BRPM | DIASTOLIC BLOOD PRESSURE: 70 MMHG | SYSTOLIC BLOOD PRESSURE: 129 MMHG | OXYGEN SATURATION: 96 % | HEART RATE: 76 BPM | TEMPERATURE: 98.2 F

## 2021-04-27 LAB
ATRIAL RATE: 77 BPM
CALCULATED P AXIS, ECG09: 51 DEGREES
CALCULATED R AXIS, ECG10: 42 DEGREES
CALCULATED T AXIS, ECG11: 55 DEGREES
CK MB CFR SERPL CALC: NORMAL % (ref 0–4)
CK MB SERPL-MCNC: <1 NG/ML (ref 5–25)
CK SERPL-CCNC: 84 U/L (ref 26–192)
DIAGNOSIS, 93000: NORMAL
P-R INTERVAL, ECG05: 178 MS
Q-T INTERVAL, ECG07: 382 MS
QRS DURATION, ECG06: 96 MS
QTC CALCULATION (BEZET), ECG08: 432 MS
TROPONIN I SERPL-MCNC: <0.02 NG/ML (ref 0–0.04)
VENTRICULAR RATE, ECG03: 77 BPM

## 2021-04-27 PROCEDURE — 74011250637 HC RX REV CODE- 250/637: Performed by: EMERGENCY MEDICINE

## 2021-04-27 RX ORDER — APIXABAN 5 MG (74)
KIT ORAL
Qty: 1 DOSE PACK | Refills: 0 | Status: SHIPPED | OUTPATIENT
Start: 2021-04-27

## 2021-04-27 RX ADMIN — APIXABAN 10 MG: 2.5 TABLET, FILM COATED ORAL at 02:27

## 2021-04-27 NOTE — PROGRESS NOTES
INTERIM UPDATE - 0138 EST on 4/27/2021    [facility] ER Clinician called requesting admission for a Patient with asymmetrical LLE Edema and Shortness of Breath after recent Shoulder Surgery with presumed Pulmonary Embolism. CTA was unable to be performed, as CT machine could not accommodate Patient's body habitus and a V/Q Scan was performed with Low to Intermediate likelihood. However, Patient does not require oxygen, is not tachypnic, is not hypotensive, and does not seem to exhibit sPESI risk factors. Recommended that a Troponin be obtained for Patient to ensure that there is no possibility of a Submassive Pulmonary Embolism. If this is negative and Patient does not exhibit any sPESI criteria and has not history of bleeds, it is reasonable for a Patient with known or presumed Non-Submassive Pulmonary Embolism to be discharged home on a Novel Oral Anticoagulant and follow-up. Plan:  Recommended that Patient's case be correlated with sPESI score, Troponin be obtained, and if Patient is at low risk for the absence of all of the factors above and has no history of bleed, consider starting Patient on Novel Oral Anticoagulant and discharging home with close follow up.

## 2021-04-27 NOTE — ED NOTES
Report given to [facility] GIANG transport for transfer to [facility]

## 2021-04-27 NOTE — ED NOTES
TRANSFER - OUT REPORT:    Verbal report given to Mohan Bina (name) on Almaz Kim  being transferred to [facility] ER then (unit) for routine progression of care       Report consisted of patients Situation, Background, Assessment and   Recommendations(SBAR). Information from the following report(s) SBAR, Kardex and Recent Results was reviewed with the receiving nurse. Lines:   Peripheral IV 04/26/21 Right Antecubital (Active)   Site Assessment Clean, dry, & intact 04/26/21 1726   Phlebitis Assessment 0 04/26/21 1726   Infiltration Assessment 0 04/26/21 1726   Dressing Status Clean, dry, & intact 04/26/21 1726   Dressing Type Tape;Transparent 04/26/21 1726   Hub Color/Line Status Pink;Flushed;Patent 04/26/21 1726   Action Taken Blood drawn 04/26/21 1726        Opportunity for questions and clarification was provided. Patient transported with:  [facility] Transfer.

## 2021-05-04 ENCOUNTER — DOCUMENTATION ONLY (OUTPATIENT)
Dept: ORTHOPEDIC SURGERY | Age: 56
End: 2021-05-04

## 2021-05-10 ENCOUNTER — TELEPHONE (OUTPATIENT)
Dept: ORTHOPEDIC SURGERY | Age: 56
End: 2021-05-10

## 2021-05-10 NOTE — TELEPHONE ENCOUNTER
[employer] form faxed as requested. Confirmation received and copy made to scan into chart. Patient notified.

## 2021-05-10 NOTE — TELEPHONE ENCOUNTER
Patient was advised that her [employer] paperwork has been completed and she's hoping we can fax to # on paperwork, says we have in the past.  Please advise patient once done, [phone].

## 2021-05-11 NOTE — PROGRESS NOTES
Wood Leos  1965     HISTORY OF PRESENT ILLNESS  Wood Leos is a 64 y.o. female who presents today for evaluation s/p Left shoulder arthroscopic rotator cuff repair, upper border subscapularis and supraspinatus, glenohumeral joint debridement on 4/12/21. Patient has not been going to PT. Describes pain as a 6/10. Has been taking tramadol for pain. Still has night pain. She has been compliant with her sling and restrictions. She is still having left leg swelling at times. Patient denies any fever, chills, chest pain, shortness of breath or calf pain. There are no new illness or injuries to report since last seen in the office. PHYSICAL EXAM:   Visit Vitals  Temp 98 °F (36.7 °C) (Skin)   Ht 5' 7\" (1.702 m)   Wt (!) 444 lb (201.4 kg)   BMI 69.54 kg/m²      The patient is a well-developed, well-nourished female in no acute distress. The patient is alert and oriented times three. The patient appears to be well groomed. Mood and affect are normal.  ORTHOPEDIC EXAM of left shoulder:  Inspection: swelling not present,  Bruising not present  Incision well healed  Passive glenohumeral abduction 0-40 degrees, 60 PFF, 10 PER  Stability: Stable  Strength: n/a  2+ distal pulses    IMPRESSION:  s/p Left shoulder arthroscopic rotator cuff repair, upper border subscapularis and supraspinatus, glenohumeral joint debridement    PLAN:   Pt doing well post operatively  She is stiff on exam but that is normal at this point. I have recommended she elevate and wear compression stockings to help with swelling at this time. This will likely get better as she gets up and moving. She will follow up with her PCP to further discuss this. D/C sling now. Will start PT and exercises now, order placed today. Stressed to patient that nothing causes an increase in pain. Pt given a refill of pain medication today. Tramadol 50 mg Patient given pain medication for short term acute pain relief.  Goal is to treat patient according to above plan and to ultimately have patient off all pain medications once appropriate. If chronic pain management is required beyond what is expected for current orthopedic problem, will refer patient to pain management.   was reviewed and will be reviewed with every medication refill request.   RTC 4 weeks    [facility]

## 2021-05-12 ENCOUNTER — HOSPITAL ENCOUNTER (EMERGENCY)
Age: 56
Discharge: HOME | End: 2021-05-12
Attending: EMERGENCY MEDICINE
Payer: COMMERCIAL

## 2021-05-12 ENCOUNTER — APPOINTMENT (OUTPATIENT)
Dept: VASCULAR SURGERY | Age: 56
End: 2021-05-12
Attending: EMERGENCY MEDICINE
Payer: COMMERCIAL

## 2021-05-12 VITALS
HEART RATE: 84 BPM | TEMPERATURE: 97.8 F | RESPIRATION RATE: 16 BRPM | OXYGEN SATURATION: 100 % | DIASTOLIC BLOOD PRESSURE: 82 MMHG | SYSTOLIC BLOOD PRESSURE: 160 MMHG

## 2021-05-12 DIAGNOSIS — M79.605 LEFT LEG PAIN: Primary | ICD-10-CM

## 2021-05-12 LAB
ALBUMIN SERPL-MCNC: 3.4 G/DL (ref 3.4–5)
ALBUMIN/GLOB SERPL: 0.8 {RATIO} (ref 0.8–1.7)
ALP SERPL-CCNC: 66 U/L (ref 45–117)
ALT SERPL-CCNC: 16 U/L (ref 13–56)
ANION GAP SERPL CALC-SCNC: 3 MMOL/L (ref 3–18)
APTT PPP: 32.4 SEC (ref 23–36.4)
AST SERPL-CCNC: 10 U/L (ref 10–38)
BASOPHILS # BLD: 0 K/UL (ref 0–0.1)
BASOPHILS NFR BLD: 1 % (ref 0–2)
BILIRUB SERPL-MCNC: 0.3 MG/DL (ref 0.2–1)
BUN SERPL-MCNC: 14 MG/DL (ref 7–18)
BUN/CREAT SERPL: 21 (ref 12–20)
CALCIUM SERPL-MCNC: 9.1 MG/DL (ref 8.5–10.1)
CHLORIDE SERPL-SCNC: 105 MMOL/L (ref 100–111)
CO2 SERPL-SCNC: 32 MMOL/L (ref 21–32)
CREAT SERPL-MCNC: 0.68 MG/DL (ref 0.6–1.3)
D DIMER PPP FEU-MCNC: 1.82 UG/ML(FEU)
DIFFERENTIAL METHOD BLD: ABNORMAL
EOSINOPHIL # BLD: 0.1 K/UL (ref 0–0.4)
EOSINOPHIL NFR BLD: 3 % (ref 0–5)
ERYTHROCYTE [DISTWIDTH] IN BLOOD BY AUTOMATED COUNT: 15.3 % (ref 11.6–14.5)
GLOBULIN SER CALC-MCNC: 4.4 G/DL (ref 2–4)
GLUCOSE SERPL-MCNC: 94 MG/DL (ref 74–99)
HCT VFR BLD AUTO: 36.1 % (ref 35–45)
HGB BLD-MCNC: 11.4 G/DL (ref 12–16)
INR PPP: 1.3 (ref 0.8–1.2)
LYMPHOCYTES # BLD: 1 K/UL (ref 0.9–3.6)
LYMPHOCYTES NFR BLD: 27 % (ref 21–52)
MCH RBC QN AUTO: 25.6 PG (ref 24–34)
MCHC RBC AUTO-ENTMCNC: 31.6 G/DL (ref 31–37)
MCV RBC AUTO: 81.1 FL (ref 74–97)
MONOCYTES # BLD: 0.4 K/UL (ref 0.05–1.2)
MONOCYTES NFR BLD: 10 % (ref 3–10)
NEUTS SEG # BLD: 2.3 K/UL (ref 1.8–8)
NEUTS SEG NFR BLD: 59 % (ref 40–73)
PLATELET # BLD AUTO: 263 K/UL (ref 135–420)
PMV BLD AUTO: 8.7 FL (ref 9.2–11.8)
POTASSIUM SERPL-SCNC: 3.7 MMOL/L (ref 3.5–5.5)
PROT SERPL-MCNC: 7.8 G/DL (ref 6.4–8.2)
PROTHROMBIN TIME: 15.5 SEC (ref 11.5–15.2)
RBC # BLD AUTO: 4.45 M/UL (ref 4.2–5.3)
SODIUM SERPL-SCNC: 140 MMOL/L (ref 136–145)
WBC # BLD AUTO: 3.9 K/UL (ref 4.6–13.2)

## 2021-05-12 PROCEDURE — 85025 COMPLETE CBC W/AUTO DIFF WBC: CPT

## 2021-05-12 PROCEDURE — 93971 EXTREMITY STUDY: CPT

## 2021-05-12 PROCEDURE — 80053 COMPREHEN METABOLIC PANEL: CPT

## 2021-05-12 PROCEDURE — 99283 EMERGENCY DEPT VISIT LOW MDM: CPT

## 2021-05-12 PROCEDURE — 85610 PROTHROMBIN TIME: CPT

## 2021-05-12 PROCEDURE — 85730 THROMBOPLASTIN TIME PARTIAL: CPT

## 2021-05-12 PROCEDURE — 85379 FIBRIN DEGRADATION QUANT: CPT

## 2021-05-12 NOTE — ED PROVIDER NOTES
EMERGENCY DEPARTMENT HISTORY AND PHYSICAL EXAM    4:16 PM  Date: 2021  Patient Name: Es Treadwell    History of Presenting Illness     Chief Complaint:     History Provided By:     HPI: Es Treadwell is a 64 y.o. female with PMH lymphedema, chronic pain, shoulder surgery on  and pulmonary embolism on Eliquis, presents with complaint of left lower extremity pain. Onset 2.5 weeks ago, constant, sharp, from her left groin to her calf, 10/10 severity, with no alleviating factors, and aggravated with movement. Reports that her left leg is weak, and she thinks that the left leg is more swollen than the right. Patient recently seen in the ED, 2021, with bilateral leg swelling and shortness of breath. At that time her D-dimer was elevated to 10.07, no findings on chest x-ray, CTA could not be completed due to patient habitus, lung perfusion studies suspicious for perfusion defect in the right upper lobe. She was diagnosed with pulmonary embolism and started on Eliquis. Reports that she has been taking Eliquis and this left leg pain is new. Patient denies chest pain, dyspnea, changes in vision, headache or paresthesias.      PCP: Julia Scott MD    Past History     Past Medical History:  Past Medical History:   Diagnosis Date    Adverse effect of anesthesia     \"Epidural\" allergy    Asthma     Chronic pain     Bilateral knees    Ill-defined condition     anemia    Lymphedema        Past Surgical History:  Past Surgical History:   Procedure Laterality Date    HX  SECTION      HX ROTATOR CUFF REPAIR Left 2021       Family History:  Family History   Problem Relation Age of Onset    Cancer Mother     Diabetes Mother     Hypertension Mother     Cancer Father     No Known Problems Brother        Social History:  Social History     Tobacco Use    Smoking status: Never Smoker    Smokeless tobacco: Never Used   Substance Use Topics    Alcohol use: No    Drug use: Never       Allergies: Allergies   Allergen Reactions    Other Medication Itching     Patient states \"Epidural\"     Tetanus And Diphtheria Toxoids Hives and Nausea and Vomiting       Review of Systems   Review of Systems   Constitutional: Negative for chills, fatigue and fever. HENT: Negative for ear pain, hearing loss and sore throat. Eyes: Negative for pain and visual disturbance. Respiratory: Negative for cough, chest tightness and shortness of breath. Cardiovascular: Positive for leg swelling. Negative for chest pain and palpitations. Gastrointestinal: Positive for abdominal pain. Negative for constipation, diarrhea, nausea and vomiting. Genitourinary: Positive for frequency. Negative for difficulty urinating and dysuria. Musculoskeletal: Negative for myalgias. Skin: Negative. Neurological: Positive for weakness. Negative for dizziness, light-headedness, numbness and headaches. Weakness in left lower extremity   Psychiatric/Behavioral: Negative. Physical Exam     Patient Vitals for the past 12 hrs:   Temp Pulse Resp BP SpO2   05/12/21 1900  84 16 (!) 160/82 100 %   05/12/21 1529 97.8 °F (36.6 °C) 85 20 (!) 164/88 100 %       Physical Exam  Constitutional:       General: She is not in acute distress. Appearance: She is obese. She is not toxic-appearing or diaphoretic. HENT:      Nose: Nose normal. No congestion or rhinorrhea. Mouth/Throat:      Mouth: Mucous membranes are moist.      Pharynx: Oropharynx is clear. No oropharyngeal exudate or posterior oropharyngeal erythema. Eyes:      General: No scleral icterus. Extraocular Movements: Extraocular movements intact. Conjunctiva/sclera: Conjunctivae normal.   Cardiovascular:      Rate and Rhythm: Normal rate and regular rhythm. Pulses: Normal pulses. Heart sounds: Normal heart sounds. No murmur. No gallop.     Pulmonary:      Effort: Pulmonary effort is normal. No respiratory distress. Breath sounds: Normal breath sounds. No wheezing, rhonchi or rales. Chest:      Chest wall: No tenderness. Abdominal:      General: Bowel sounds are normal. There is no distension. Tenderness: There is no abdominal tenderness. There is no guarding or rebound. Musculoskeletal:         General: Swelling and tenderness present. No deformity or signs of injury. Right lower leg: Edema present. Left lower leg: Edema present. Comments: Tenderness in left calf   Skin:     General: Skin is warm. Capillary Refill: Capillary refill takes less than 2 seconds. Coloration: Skin is not jaundiced. Findings: No lesion or rash. Neurological:      General: No focal deficit present. Mental Status: She is alert and oriented to person, place, and time. Cranial Nerves: No cranial nerve deficit. Sensory: No sensory deficit. Motor: Weakness present. Comments: 5/5 strength in bilateral upper extremities and right lower extremity  4/5 strength in left lower extremity   Psychiatric:         Mood and Affect: Mood normal.         Behavior: Behavior normal.         Thought Content: Thought content normal.         Judgment: Judgment normal.       Diagnostic Study Results     Labs -  Recent Results (from the past 12 hour(s))   CBC WITH AUTOMATED DIFF    Collection Time: 05/12/21  4:38 PM   Result Value Ref Range    WBC 3.9 (L) 4.6 - 13.2 K/uL    RBC 4.45 4.20 - 5.30 M/uL    HGB 11.4 (L) 12.0 - 16.0 g/dL    HCT 36.1 35.0 - 45.0 %    MCV 81.1 74.0 - 97.0 FL    MCH 25.6 24.0 - 34.0 PG    MCHC 31.6 31.0 - 37.0 g/dL    RDW 15.3 (H) 11.6 - 14.5 %    PLATELET 739 764 - 196 K/uL    MPV 8.7 (L) 9.2 - 11.8 FL    NEUTROPHILS 59 40 - 73 %    LYMPHOCYTES 27 21 - 52 %    MONOCYTES 10 3 - 10 %    EOSINOPHILS 3 0 - 5 %    BASOPHILS 1 0 - 2 %    ABS. NEUTROPHILS 2.3 1.8 - 8.0 K/UL    ABS. LYMPHOCYTES 1.0 0.9 - 3.6 K/UL    ABS. MONOCYTES 0.4 0.05 - 1.2 K/UL    ABS. EOSINOPHILS 0.1 0.0 - 0.4 K/UL    ABS. BASOPHILS 0.0 0.0 - 0.1 K/UL    DF AUTOMATED     METABOLIC PANEL, COMPREHENSIVE    Collection Time: 05/12/21  4:38 PM   Result Value Ref Range    Sodium 140 136 - 145 mmol/L    Potassium 3.7 3.5 - 5.5 mmol/L    Chloride 105 100 - 111 mmol/L    CO2 32 21 - 32 mmol/L    Anion gap 3 3.0 - 18 mmol/L    Glucose 94 74 - 99 mg/dL    BUN 14 7.0 - 18 MG/DL    Creatinine 0.68 0.6 - 1.3 MG/DL    BUN/Creatinine ratio 21 (H) 12 - 20      GFR est AA >60 >60 ml/min/1.73m2    GFR est non-AA >60 >60 ml/min/1.73m2    Calcium 9.1 8.5 - 10.1 MG/DL    Bilirubin, total 0.3 0.2 - 1.0 MG/DL    ALT (SGPT) 16 13 - 56 U/L    AST (SGOT) 10 10 - 38 U/L    Alk. phosphatase 66 45 - 117 U/L    Protein, total 7.8 6.4 - 8.2 g/dL    Albumin 3.4 3.4 - 5.0 g/dL    Globulin 4.4 (H) 2.0 - 4.0 g/dL    A-G Ratio 0.8 0.8 - 1.7     D DIMER    Collection Time: 05/12/21  4:38 PM   Result Value Ref Range    D DIMER 1.82 (H) <0.46 ug/ml(FEU)   PROTHROMBIN TIME + INR    Collection Time: 05/12/21  4:38 PM   Result Value Ref Range    Prothrombin time 15.5 (H) 11.5 - 15.2 sec    INR 1.3 (H) 0.8 - 1.2     PTT    Collection Time: 05/12/21  4:38 PM   Result Value Ref Range    aPTT 32.4 23.0 - 36.4 SEC     Radiologic Studies -   No results found. Medical Decision Making     ED Course: Progress Notes, Reevaluation, and Consults:    4:16 PM Initial assessment performed. The patients presenting problems have been discussed, and they/their family are in agreement with the care plan formulated and outlined with them. I have encouraged them to ask questions as they arise throughout their visit. Provider Notes (Medical Decision Making): 51-year-old female with recent history of a PE on Eliquis, presented to the ED with left lower extremity pain. On exam no findings suggestive of acute DVT including no erythema, swelling, or asymmetry of the left lower extremity. PVLs of the left leg do not show acute DVT.   Patient with chronic bilateral lower extremity pain is currently being followed by her PCP to evaluate for lymphedema. Patient has been referred to vascular surgery but has not had an appointment today. Discussed continue follow-up with PCP to ensure that she has had vascular surgery follow-up. Patient discharged home with return to ED precautions. EKG: Not indicated     Labs as interpreted by me: CBC with no significant findings, although leukopenia (WBC 3.9) noted. CMP wnl. D-dimer elevated at 1.82, although downtrending from previous D-dimer of 10.07 on 4/26. Imaging: PVL left lower extremity showed no evidence of acute DVT    Vital Signs-Reviewed the patient's vital signs. Reviewed pt's pulse ox reading. Records Reviewed: old medical records  -I am the first provider for this patient.  -I reviewed the vital signs, available nursing notes, past medical history, past surgical history, family history and social history. Current Outpatient Medications   Medication Sig Dispense Refill    apixaban (Eliquis DVT-PE Treat 30D Start) 5 mg (74 tabs) starter pack Take 10 mg (two 5 mg tablets) by mouth twice a day for 7 days   Followed by 5 mg (one 5 mg tablet) by mouth twice a day 1 Dose Pack 0    cyclobenzaprine (FLEXERIL) 10 mg tablet Take 1 Tab by mouth two (2) times a day. Indications: muscle spasm 60 Tab 0    multivitamin (ONE A DAY) tablet Take 1 Tab by mouth daily.  cyanocobalamin, vitamin B-12, (Vitamin B-12) 5,000 mcg subl 5,000 mcg by SubLINGual route daily. Dissolvable      acetaminophen (Tylenol Extra Strength) 500 mg tablet Take  by mouth every six (6) hours as needed for Pain. Clinical Impression     Clinical Impression:   1.  Left leg pain      Disposition: discharge home    Nuria Perez MD, PGY1   ED rotation - [facility] Medicine   May 12, 2021

## 2021-05-12 NOTE — ED TRIAGE NOTES
Pt c/o left leg pain. Has chronic bilateral leg edema. States left leg is more swollen.  Pain radiates from left calf up to left thigh

## 2021-05-13 NOTE — ROUTINE PROCESS
Bigg White is a 64 y.o. female that was discharged in stable. Pt was accompanied by self. Pt is driving. The patients diagnosis, condition and treatment were explained to  patient and aftercare instructions were given. The patient verbalized understanding. Patient armband removed and shredded.

## 2021-05-14 ENCOUNTER — OFFICE VISIT (OUTPATIENT)
Dept: ORTHOPEDIC SURGERY | Age: 56
End: 2021-05-14
Payer: COMMERCIAL

## 2021-05-14 VITALS — TEMPERATURE: 98 F | BODY MASS INDEX: 45.99 KG/M2 | HEIGHT: 67 IN | WEIGHT: 293 LBS

## 2021-05-14 DIAGNOSIS — M75.122 NONTRAUMATIC COMPLETE TEAR OF LEFT ROTATOR CUFF: Primary | ICD-10-CM

## 2021-05-14 DIAGNOSIS — Z98.890 STATUS POST ARTHROSCOPY OF LEFT SHOULDER: ICD-10-CM

## 2021-05-14 DIAGNOSIS — G89.18 POST-OP PAIN: ICD-10-CM

## 2021-05-14 DIAGNOSIS — S46.102A INJURY OF TENDON OF LONG HEAD OF LEFT BICEPS, INITIAL ENCOUNTER: ICD-10-CM

## 2021-05-14 PROCEDURE — 99024 POSTOP FOLLOW-UP VISIT: CPT | Performed by: ORTHOPAEDIC SURGERY

## 2021-05-14 RX ORDER — TRAMADOL HYDROCHLORIDE 50 MG/1
50 TABLET ORAL
Qty: 40 TAB | Refills: 0 | Status: SHIPPED | OUTPATIENT
Start: 2021-05-14 | End: 2021-05-27

## 2021-05-17 ENCOUNTER — OFFICE VISIT (OUTPATIENT)
Dept: CARDIOLOGY CLINIC | Age: 56
End: 2021-05-17
Payer: COMMERCIAL

## 2021-05-17 VITALS
OXYGEN SATURATION: 95 % | HEIGHT: 67 IN | BODY MASS INDEX: 45.99 KG/M2 | WEIGHT: 293 LBS | DIASTOLIC BLOOD PRESSURE: 80 MMHG | SYSTOLIC BLOOD PRESSURE: 132 MMHG | HEART RATE: 88 BPM

## 2021-05-17 DIAGNOSIS — G47.30 SLEEP APNEA, UNSPECIFIED TYPE: ICD-10-CM

## 2021-05-17 DIAGNOSIS — I27.20 PULMONARY HYPERTENSION (HCC): ICD-10-CM

## 2021-05-17 DIAGNOSIS — R60.0 LEG EDEMA: ICD-10-CM

## 2021-05-17 DIAGNOSIS — R06.09 DOE (DYSPNEA ON EXERTION): Primary | ICD-10-CM

## 2021-05-17 PROCEDURE — 99204 OFFICE O/P NEW MOD 45 MIN: CPT | Performed by: INTERNAL MEDICINE

## 2021-05-17 PROCEDURE — 93000 ELECTROCARDIOGRAM COMPLETE: CPT | Performed by: INTERNAL MEDICINE

## 2021-05-17 RX ORDER — FUROSEMIDE 20 MG/1
20 TABLET ORAL AS NEEDED
Qty: 30 TAB | Refills: 1 | Status: SHIPPED | OUTPATIENT
Start: 2021-05-17 | End: 2021-06-10

## 2021-05-17 NOTE — LETTER
5/17/2021 Patient: Roberto Esparza YOB: 1965 Date of Visit: 5/17/2021 Meri Benitez MD 
[address] [address] [address] [facility] [address] Dear Meri Benitez MD, Thank you for referring Ms. Lisa Fair to [address] [place] [address] for evaluation. My notes for this consultation are attached. If you have questions, please do not hesitate to call me. I look forward to following your patient along with you. Sincerely, Farhat Vegas MD

## 2021-05-17 NOTE — PROGRESS NOTES
[facility]    Ms. Vera Fine is 51-year-old female with morbid obesity, lymphedema, degenerative joint disease    Patient is here today for initial cardiac evaluation. She denies any prior history of MI or CHF. Patient was sent to me for the evaluation and management for edema. Patient tells me that she has lower extremity edema bilaterally on and off for long. The time. She has used Lasix in the past.  Patient recently had a shoulder surgery and after that she had an episode of shortness of breath and some lower extremity swelling. She went to emergency department  She was started on Eliquis for presumed PE and DVT. She has appointment with vascular surgery team regarding this matter as well. Patient denies any chest pain or chest tightness to be concern of angina at this time. She has some dyspnea however it has been stable with moderate exertion. She is still able to perform activity of daily living and activities at work without any angina or heart failure symptoms. She is limited because of her knee pain. Lately patient has been having lower extremity edema especially on the left leg. She feels like her leg is heavy. She is going to see vascular surgery team  Recent lower extremity vascular study did not show any obvious DVT. Patient tells me that she is snoring and she also feels daytime fatigue and tiredness. Denies any nausea, vomiting, abdominal pain, fever, chills, sputum production. No hematuria or other bleeding complaints    Past Medical History:   Diagnosis Date    Adverse effect of anesthesia     \"Epidural\" allergy    Asthma     Chronic pain     Bilateral knees    Ill-defined condition     anemia    Lymphedema        Review of Systems:  Cardiac symptoms as noted above in HPI. All others negative.   Denies fatigue, malaise, skin rash, joint pain, blurring vision, photophobia, neck pain, hemoptysis, chronic cough, nausea, vomiting, hematuria, burning micturition, BRBPR, chronic headaches. Current Outpatient Medications   Medication Sig    traMADoL (ULTRAM) 50 mg tablet Take 1 Tab by mouth every eight (8) hours as needed for Pain for up to 13 days. Max Daily Amount: 150 mg.    apixaban (Eliquis DVT-PE Treat 30D Start) 5 mg (74 tabs) starter pack Take 10 mg (two 5 mg tablets) by mouth twice a day for 7 days   Followed by 5 mg (one 5 mg tablet) by mouth twice a day    cyclobenzaprine (FLEXERIL) 10 mg tablet Take 1 Tab by mouth two (2) times a day. Indications: muscle spasm    multivitamin (ONE A DAY) tablet Take 1 Tab by mouth daily.  cyanocobalamin, vitamin B-12, (Vitamin B-12) 5,000 mcg subl 5,000 mcg by SubLINGual route daily. Dissolvable    acetaminophen (Tylenol Extra Strength) 500 mg tablet Take  by mouth every six (6) hours as needed for Pain. No current facility-administered medications for this visit. Past Surgical History:   Procedure Laterality Date    HX  SECTION      HX ROTATOR CUFF REPAIR Left 2021       Allergies and Sensitivities:  Allergies   Allergen Reactions    Other Medication Itching     Patient states \"Epidural\"     Tetanus And Diphtheria Toxoids Hives and Nausea and Vomiting       Family History:  Family History   Problem Relation Age of Onset    Cancer Mother     Diabetes Mother     Hypertension Mother     Cancer Father     No Known Problems Brother        Social History:  Social History     Tobacco Use    Smoking status: Never Smoker    Smokeless tobacco: Never Used   Substance Use Topics    Alcohol use: No    Drug use: Never     She  reports that she has never smoked. She has never used smokeless tobacco.  She  reports no history of alcohol use.     Physical Exam:  BP Readings from Last 3 Encounters:   21 (!) 160/82   21 129/70   21 124/64         Pulse Readings from Last 3 Encounters:   21 84   21 76   21 94 Wt Readings from Last 3 Encounters:   05/14/21 (!) 201.4 kg (444 lb)   04/26/21 (!) 186 kg (410 lb)   04/21/21 (!) 205.5 kg (453 lb)       Constitutional: Oriented to person, place, and time. HENT: Head: Normocephalic and atraumatic. Eyes: Conjunctivae and extraocular motions are normal.   Neck: No JVD present. Carotid bruit is not appreciated. Cardiovascular: Regular rhythm. No murmur, gallop or rubs appreciated  Lung: Breath sounds normal. No respiratory distress. No ronchi or rales appreciated  Abdominal: No tenderness. No rebound and no guarding. Musculoskeletal: There is bilateral nonpitting edema, left leg has slight pitting component to it. No cynosis  Lymphadenopathy:  No cervical or supraclavicular adenopathy appriciated. Neurological: No gross motor deficit noted. Skin: No visible skin rash noted. No Ear discharge noted  Psychiatric: Normal mood and affect. Good distal pulse    LABS:   @  Lab Results   Component Value Date/Time    WBC 3.9 (L) 05/12/2021 04:38 PM    HGB 11.4 (L) 05/12/2021 04:38 PM    HCT 36.1 05/12/2021 04:38 PM    PLATELET 934 78/58/9067 04:38 PM    MCV 81.1 05/12/2021 04:38 PM     Lab Results   Component Value Date/Time    Sodium 140 05/12/2021 04:38 PM    Potassium 3.7 05/12/2021 04:38 PM    Chloride 105 05/12/2021 04:38 PM    CO2 32 05/12/2021 04:38 PM    Glucose 94 05/12/2021 04:38 PM    BUN 14 05/12/2021 04:38 PM    Creatinine 0.68 05/12/2021 04:38 PM     No flowsheet data found. Lab Results   Component Value Date/Time    ALT (SGPT) 16 05/12/2021 04:38 PM     No results found for: HBA1C, HGBE8, ZON6VNBD, IUF6HIFP  No results found for: TSH, TSH2, TSH3, TSHP, TSHEXT    EKG    ECHO (2020)  Left Ventricle Normal cavity size, wall thickness and systolic function (ejection fraction normal). Wall motion: normal. The estimated EF is 55 - 60%. Visually measured ejection fraction. There is age-appropriate left ventricular diastolic function.    Wall Scoring The left ventricular wall motion is normal.            Left Atrium Mildly dilated left atrium. Left Atrium volume index is 38 mL/m2. Right Ventricle Normal cavity size and global systolic function. Right Atrium Mildly dilated right atrium. Aortic Valve No stenosis and no regurgitation. Probably trileaflet aortic valve. Mitral Valve Normal valve structure and no stenosis. Mild regurgitation. Tricuspid Valve Normal valve structure and no stenosis. Mild regurgitation. Pulmonic Valve Pulmonic valve not well visualized, but normal doppler findings. Aorta Mildly dilated aortic root; diameter is 3.6 cm. Pulmonary Artery Pulmonary arterial systolic pressure (PASP) is 49 mmHg. Pulmonary hypertension found to be mild to moderate. IVC/Hepatic Veins Moderately elevated central venous pressure (10-15 mmHg); IVC diameter is larger than 21 mm and collapses more than 50% with respiration. STRESS TEST (EST, PHARM, NUC, ECHO etc)    CATHETERIZATION    IMPRESSION & PLAN:  Ms. Alta Magana is 59-year-old female    Edema:  Patient has lower extremity edema on and off for long time. She has take Lasix in the past.  She had echocardiogram in 2020 which showed mild to moderate pulmonary hypertension but normal ejection fraction and no major valvular heart disease  On exam today she has nonpitting edema with slight component of pitting edema on the left leg. Recent vascular studies has been unremarkable. She is on Eliquis presumably for PE and DVT. I will defer this management to PCP and vascular team  I will give her Lasix 20 mg as needed use  I will repeat echo to check on EF and pulmonary pressure to make sure she is not developing pulmonary hypertension    I will refer patient to pulmonary sleep apnea clinic to rule out sleep apnea. She is at risk to have underlying sleep apnea especially as she has mild to moderate pulmonary hypertension and edema could be from right-sided heart failure.   Echo has been ordered    Obesity:  Importance of diet and exercise was discussed with patient. Patient is considering gastric bypass surgery as she has exhausted all nonsurgical intervention herself. She is going to discuss this with PCP    This plan was discussed with patient who is in agreement. Thank you for allowing me to participate in patient care. Please feel free to call me if you have any question or concern. Christophre Mackay MD  Please note: This document has been produced using voice recognition software. Unrecognized errors in transcription may be present.

## 2021-05-17 NOTE — PROGRESS NOTES
Wood January presents today for   Chief Complaint   Patient presents with    New Patient     ref by PCP for LE edema and TENORIO    Surgical Clearance     Colonoscopy       Kelin January preferred language for health care discussion is english/other. Is someone accompanying this pt? no    Is the patient using any DME equipment during [address]? no    Depression Screening:  3 most recent PHQ Screens 5/17/2021   Little interest or pleasure in doing things Not at all   Feeling down, depressed, irritable, or hopeless Not at all   Total Score PHQ 2 0       Learning Assessment:  Learning Assessment 5/17/2021   PRIMARY LEARNER Patient   PRIMARY LANGUAGE ENGLISH   LEARNER PREFERENCE PRIMARY DEMONSTRATION   ANSWERED BY patient   RELATIONSHIP SELF       Abuse Screening:  Abuse Screening Questionnaire 5/17/2021   Do you ever feel afraid of your partner? N   Are you in a relationship with someone who physically or mentally threatens you? N   Is it safe for you to go home? Y       Fall Risk  No flowsheet data found. Pt currently taking Anticoagulant therapy? no    Coordination of Care:  1. Have you been to the ER, urgent care clinic since your last visit? Hospitalized since your last visit? no    2. Have you seen or consulted any other health care providers outside of the [address] [place] [address] since your last visit? Include any pap smears or colon screening.  no

## 2021-05-19 ENCOUNTER — HOSPITAL ENCOUNTER (OUTPATIENT)
Dept: GENERAL RADIOLOGY | Age: 56
Discharge: HOME | End: 2021-05-19
Payer: COMMERCIAL

## 2021-05-19 DIAGNOSIS — M25.562 LEFT KNEE PAIN: ICD-10-CM

## 2021-05-19 PROCEDURE — 73562 X-RAY EXAM OF KNEE 3: CPT

## 2021-05-28 ENCOUNTER — HOSPITAL ENCOUNTER (OUTPATIENT)
Dept: PHYSICAL THERAPY | Age: 56
Discharge: HOME | End: 2021-05-28
Attending: ORTHOPAEDIC SURGERY
Payer: COMMERCIAL

## 2021-05-28 PROCEDURE — 97161 PT EVAL LOW COMPLEX 20 MIN: CPT

## 2021-05-28 PROCEDURE — 97535 SELF CARE MNGMENT TRAINING: CPT

## 2021-05-28 PROCEDURE — 97110 THERAPEUTIC EXERCISES: CPT

## 2021-05-28 NOTE — PROGRESS NOTES
[facility] [address] [id number] [facility], [address] [place] [address], [facility] [address]  [phone] fax    Plan of Care/ Statement of Necessity for Physical Therapy Services  Patient name: Jane Alex Start of Care: 2021   Referral source: Noelle Hathaway, [address] : 1965    Medical Diagnosis: Pain in left shoulder [M25.512]  Payor: Toy Jerez / Plan: Jevon Rios PPO / Product Type: PPO /  Onset Date: DOS 2021    Treatment Diagnosis: left shoulder pain   Prior Hospitalization: see medical history Provider#: [id number]   Medications: Verified on Patient summary List    Comorbidities: asthma, lymphedema. Prior Level of Function: Independent with ADls, functional, and work activities with pain in the left shoulder before surgery. The Plan of Care and following information is based on the information from the initial evaluation. Assessment/ key information:   Pt is a 64year old female who presents to therapy today with left shoulder pain s/p upper border subscapularis and supraspinatus repair, GHJ debridement DOS 2021. Pt reports having no complications with the surgery in the left  UE but states she may have had a DVT in her left LE (was treated with blood thinners and has resolved per pt report). Pt presents to therapy today with no sling on the left UE and reports having difficulty with dressing/bathing/ADLs. Pt demonstrated decreased PROM, decreased scapular mobility, muscle tightness, and tenderness to palpation. Pt would benefit from physical therapy to improve the above impairments to help the pt return to performing ADLs, functional and work activities.      Evaluation Complexity History MEDIUM  Complexity : 1-2 comorbidities / personal factors will impact the outcome/ POC ; Examination LOW Complexity : 1-2 Standardized tests and measures addressing body structure, function, activity limitation and / or participation in recreation  ;Presentation LOW Complexity : Stable, uncomplicated  ;Clinical Decision Making MEDIUM Complexity : FOTO score of 26-74  Overall Complexity Rating: LOW   Problem List: pain affecting function, decrease ROM, decrease strength, edema affecting function, decrease ADL/ functional abilitiies, decrease activity tolerance, decrease flexibility/ joint mobility and decrease transfer abilities   Treatment Plan may include any combination of the following: Therapeutic exercise, Therapeutic activities, Neuromuscular re-education, Physical agent/modality, Manual therapy, Patient education, Self Care training, Functional mobility training and Home safety training  Patient / Family readiness to learn indicated by: asking questions, trying to perform skills and interest  Persons(s) to be included in education: patient (P)  Barriers to Learning/Limitations: None  Patient Goal (s): less pain and full range of motion  Patient Self Reported Health Status: fair  Rehabilitation Potential: good    Short Term Goals: To be accomplished in 2 treatments:  1. Pt will report compliance and independence to HEP to help the pt manage their pain and symptoms. Eval: established   Long Term Goals: To be accomplished in 10 treatments:  1. Pt will increase FOTO score to 64 points to improve ability to perform ADLs. Eval: 41 points  2. Pt will report an improvement in at best pain to at least 3/10 to improve ability to tolerate bathing. Eval: 6/10 at best  3. Pt will increase PROM left shoulder flex to at least 120 degs, ABD to 75 degs to improve ability to progress through protocol with increased ease. Eval: flex 50 degs, ABD 20 degs  4. Pt will report being able to don/doff a shirt independently to improve independence with dressing activities. Eval: reports needing assistance from family with donning/doffing a shirt. Frequency / Duration: Patient to be seen 2-3 times per week for 10 treatments.     Patient/ Caregiver education and instruction: Diagnosis, prognosis, self care, activity modification and exercises   [x]  Plan of care has been reviewed with PTA    Nikkie Harvey, PT 5/28/2021 3:03 PM  _____________________________________________________________________  I certify that the above Therapy Services are being furnished while the patient is under my care. I agree with the treatment plan and certify that this therapy is necessary.     Physician's Signature:____________________  Date:__________Time:______    Please sign and return to [facility] [address], [address] [place] [address],[address] [place] [address]  [phone] fax

## 2021-05-28 NOTE — PROGRESS NOTES
PT DAILY TREATMENT NOTE  10-18    Patient Name: Maday Ha  Date:2021  : 1965  [x]  Patient  Verified  Payor: Reji Hernandez / Plan: Darylene Ronde PPO / Product Type: PPO /    In time: 2:23  Out time:2:58  Total Treatment Time (min): 35  Visit #: 1 of 10    Treatment Area: Pain in left shoulder [M25.512]    SUBJECTIVE  Pain Level (0-10 scale): 7  Any medication changes, allergies to medications, adverse drug reactions, diagnosis change, or new procedure performed?: [x] No    [] Yes (see summary sheet for update)  Subjective functional status/changes:   [] No changes reported  See POC    OBJECTIVE    15 min [x]Eval                  []Re-Eval     10 min Therapeutic Exercise:  [] See flow sheet : HEP instruction and demonstration   Rationale: increase ROM and increase strength to improve the patients ability to tolerate ADLs    10 min Self Care/Home Management: []  See flow sheet : pt education regarding anatomy and physiology of the UEs/scapulae and how it relates to the pt's condition, pt education on using ice for 15-20 mins as needed to decrease pain/symptoms, pt education on avoiding activities that increase her pain/symptoms and to avoid AROM of the left shoulder until cleared to do so per protocol (PROM at this time per script). Rationale: increase ROM, increase strength and decrease pain/symptoms  to improve the patients ability to tolerate functional tasks. With   [x] TE   [] TA   [] neuro   [x] Other: Self Care/Home management Patient Education: [x] Review HEP    [] Progressed/Changed HEP based on:   [] positioning   [] body mechanics   [] transfers   [] heat/ice application    [] other:      Other Objective/Functional Measures: See evaluation. Pain Level (0-10 scale) post treatment: 7-8    ASSESSMENT/Changes in Function: Pt given HEP handout to perform. Pt understood exercises in HEP handout.  Pt demonstrated decreased PROM, decreased scapular mobility, muscle tightness, and tenderness to palpation. Pt would benefit from physical therapy to improve the above impairments to help the pt return to performing ADLs, functional and work activities. Patient will continue to benefit from skilled PT services to modify and progress therapeutic interventions, address functional mobility deficits, address ROM deficits, address strength deficits, analyze and address soft tissue restrictions, analyze and cue movement patterns, analyze and modify body mechanics/ergonomics, assess and modify postural abnormalities and instruct in home and community integration to attain remaining goals. [x]  See Plan of Care  []  See progress note/recertification  []  See Discharge Summary         Progress towards goals / Updated goals:  Short Term Goals: To be accomplished in 2 treatments:  1. Pt will report compliance and independence to [facility] to help the pt manage their pain and symptoms. Eval: established   Long Term Goals: To be accomplished in 10 treatments:  1. Pt will increase FOTO score to 64 points to improve ability to perform ADLs. Eval: 41 points  2. Pt will report an improvement in at best pain to at least 3/10 to improve ability to tolerate bathing. Eval: 6/10 at best  3. Pt will increase PROM left shoulder flex to at least 120 degs, ABD to 75 degs to improve ability to progress through protocol with increased ease. Eval: flex 50 degs, ABD 20 degs  4. Pt will report being able to don/doff a shirt independently to improve independence with dressing activities. Eval: reports needing assistance from family with donning/doffing a shirt.      PLAN  [x]  Upgrade activities as tolerated     [x]  Continue plan of care  [x]  Update interventions per flow sheet       []  Discharge due to:_  []  Other:_      Sherrell Bhardwaj, PT 5/28/2021  3:03 PM    Future Appointments   Date Time Provider Deisy Palomo   6/14/2021  1:30 PM DR [facility] BS AMB   6/16/2021  3:15 PM Clevester Stage CHERELLE DURANT [facility] BS AMB   6/22/2021  9:30 AM [facility] BS AMB   8/16/2021 10:00 AM Sue Chandler MD [facility] BS AMB

## 2021-06-02 ENCOUNTER — APPOINTMENT (OUTPATIENT)
Dept: PHYSICAL THERAPY | Age: 56
End: 2021-06-02
Attending: ORTHOPAEDIC SURGERY
Payer: COMMERCIAL

## 2021-06-03 ENCOUNTER — HOSPITAL ENCOUNTER (OUTPATIENT)
Dept: PHYSICAL THERAPY | Age: 56
Discharge: HOME | End: 2021-06-03
Attending: ORTHOPAEDIC SURGERY
Payer: COMMERCIAL

## 2021-06-03 PROCEDURE — 97110 THERAPEUTIC EXERCISES: CPT | Performed by: PHYSICAL THERAPIST

## 2021-06-03 PROCEDURE — 97530 THERAPEUTIC ACTIVITIES: CPT | Performed by: PHYSICAL THERAPIST

## 2021-06-03 PROCEDURE — 97112 NEUROMUSCULAR REEDUCATION: CPT | Performed by: PHYSICAL THERAPIST

## 2021-06-03 PROCEDURE — 97140 MANUAL THERAPY 1/> REGIONS: CPT | Performed by: PHYSICAL THERAPIST

## 2021-06-03 NOTE — PROGRESS NOTES
PT DAILY TREATMENT NOTE     Patient Name: Kyree Nunez  Date:6/3/2021  : 1965  [x]  Patient  Verified  Payor: Eduardo Parra / Plan: Ritika León PPO / Product Type: PPO /    In time:1:41P  Out time:2:40P  Total Treatment Time (min): 59min  Visit #: 2 of 10    Treatment Area: Pain in left shoulder [M25.512]    SUBJECTIVE  Pain Level (0-10 scale): 7/10  Any medication changes, allergies to medications, adverse drug reactions, diagnosis change, or new procedure performed?: [x] No    [] Yes (see summary sheet for update)  Subjective functional status/changes:   [] No changes reported  Patient reports her Left knee is hurting today, in fact her whole left side is hurting. Did take the pain pills today before therapy. OBJECTIVE    Modality rationale: decrease edema and decrease inflammation to improve the patients ability to decrease exercise-induced muscle soreness.    Min Type Additional Details    [] Estim:  []Unatt       []IFC  []Premod                        []Other:  []w/ice   []w/heat  Position:  Location:    [] Estim: []Att    []TENS instruct  []NMES                    []Other:  []w/US   []w/ice   []w/heat  Position:  Location:    []  Traction: [] Cervical       []Lumbar                       [] Prone          []Supine                       []Intermittent   []Continuous Lbs:  [] before manual  [] after manual    []  Ultrasound: []Continuous   [] Pulsed                           []1MHz   []3MHz W/cm2:  Location:    []  Iontophoresis with dexamethasone         Location: [] Take home patch   [] In clinic   16 [x]  Ice     []  heat  []  Ice massage  []  Laser   []  Anodyne Position: supine with leg propped  Location: Left shoulder    []  Laser with stim  []  Other:  Position:  Location:    []  Vasopneumatic Device  Pre-treatment girth:  Post-treatment girth:  Measured at (location):  Pressure:       [] lo [] med [] hi   Temperature: [] lo [] med [] hi   [] Skin assessment post-treatment: []intact []redness- no adverse reaction    []redness  adverse reaction:     13 min Therapeutic Exercise:  [x] See flow sheet :   Rationale: increase ROM and increase strength to improve the patients ability to increase A/ROM and decrease pain with movement. 12 min Therapeutic Activity:  [x]  See flow sheet :   Rationale: increase strength and improve coordination  to improve the patients ability to Tolerate basic ADLs and household-related tasks without pain. 10 min Neuromuscular Re-education:  [x]  See flow sheet :   Rationale: improve coordination, improve balance and increase proprioception  to improve the patients ability to the patients ability to perform functional tasks such as overhead reach when appropriat    8 min Manual Therapy:  Grade 1-2 mobs to Left [address] joint into A/P and inf/post directions. The manual therapy interventions were performed at a separate and distinct time from the therapeutic activities interventions. Rationale: increase ROM and increase tissue extensibility to improve return to functional lifting when appropriate. With   [x] TE   [x] TA   [x] neuro   [] other: Patient Education: [x] Review HEP    [x] Progressed/Changed HEP based on:   [x] positioning   [] body mechanics   [] transfers   [] heat/ice application    [] other:      Other Objective/Functional Measures: extremely gaurded - P/ROM left shoulder to 60 degrees only     Pain Level (0-10 scale) post treatment: 0/10    ASSESSMENT/Changes in Function: Patient is progressing slowly towards goals of decreased pain and increased functional movement however limited by body habitus and high sensitivity to pain. Patient education on the stress reaction of pain causing more pain. Ended with ice that calmed everything down.      Patient will continue to benefit from skilled PT services to modify and progress therapeutic interventions, address functional mobility deficits, address ROM deficits, address strength deficits, analyze and address soft tissue restrictions, analyze and cue movement patterns, analyze and modify body mechanics/ergonomics, assess and modify postural abnormalities, address imbalance/dizziness and instruct in home and community integration to attain remaining goals. [x]  See Plan of Care  []  See progress note/recertification  []  See Discharge Summary         Progress towards goals / Updated goals:  Short Term Goals: To be accomplished in 2 treatments:  1. Pt will report compliance and independence to [facility] to help the pt manage their pain and symptoms. Eval: established  Current: progressing, reports compliance   Long Term Goals: To be accomplished in 10 treatments:  1. Pt will increase FOTO score to 64 points to improve ability to perform ADLs. Eval: 41 points  2. Pt will report an improvement in at best pain to at least 3/10 to improve ability to tolerate bathing. Eval: 6/10 at best  3. Pt will increase PROM left shoulder flex to at least 120 degs, ABD to 75 degs to improve ability to progress through protocol with increased ease. Eval: flex 50 degs, ABD 20 degs  4. Pt will report being able to don/doff a shirt independently to improve independence with dressing activities. Eval: reports needing assistance from family with donning/doffing a shirt.      PLAN  [x]  Upgrade activities as tolerated     []  Continue plan of care  []  Update interventions per flow sheet       []  Discharge due to:_  []  Other:_      Selam Garcia, PT 6/3/2021  5:07 PM    Future Appointments   Date Time Provider [address]isy [address]   6/7/2021 12:00 PM Blessing Ballard, PT [facility]   6/9/2021  1:30 PM Joesph Morales PTA [facility]   6/11/2021  1:30 PM Bessie Bonilla, PT [facility]   6/14/2021  1:30 PM DR [facility] BS AMB   6/14/2021  3:45 PM Blessing Ballard, PT [facility]   6/16/2021 12:00 PM Joesph Morales PTA [facility]   6/16/2021  3:15 PM CHERELLE Oliveros [facility] BS AMB 6/18/2021  1:30 PM Demarco Jensen, PT [facility]   6/21/2021  1:30 PM Marycarmen Serrano, PT [facility]   6/22/2021  9:30 AM [facility] GE [address] [facility] BS AMB   6/23/2021  1:30 PM Demarco Jensen, PT [facility]   8/16/2021 10:00 AM Jimbo Fish MD [facility] BS AMB

## 2021-06-07 ENCOUNTER — HOSPITAL ENCOUNTER (OUTPATIENT)
Dept: PHYSICAL THERAPY | Age: 56
Discharge: HOME | End: 2021-06-07
Attending: ORTHOPAEDIC SURGERY
Payer: COMMERCIAL

## 2021-06-07 PROCEDURE — 97112 NEUROMUSCULAR REEDUCATION: CPT | Performed by: PHYSICAL THERAPIST

## 2021-06-07 PROCEDURE — 97110 THERAPEUTIC EXERCISES: CPT | Performed by: PHYSICAL THERAPIST

## 2021-06-07 PROCEDURE — 97530 THERAPEUTIC ACTIVITIES: CPT | Performed by: PHYSICAL THERAPIST

## 2021-06-07 PROCEDURE — 97140 MANUAL THERAPY 1/> REGIONS: CPT | Performed by: PHYSICAL THERAPIST

## 2021-06-07 NOTE — PROGRESS NOTES
PT DAILY TREATMENT NOTE     Patient Name: Enedina Lee  Date:2021  : 1965  [x]  Patient  Verified  Payor: Kellie Chin / Plan: Sammy Ashford PPO / Product Type: PPO /    In time:1:34P  Out time:2:38P  Total Treatment Time (min): 64min  Visit #: 3 of 10    Treatment Area: Pain in left shoulder [M25.512]    SUBJECTIVE  Pain Level (0-10 scale): 10/10  Any medication changes, allergies to medications, adverse drug reactions, diagnosis change, or new procedure performed?: [x] No    [] Yes (see summary sheet for update)  Subjective functional status/changes:   [] No changes reported  Upon discussion with patient, she denies needing to go to the ED based on her elevated pain level and also reports it is NOT the worse pain she has ever felt, but it does help. Also states she hasn't taken any pain medication today due to running late and just not sleeping well last night. OBJECTIVE         Modality rationale: decrease edema and decrease inflammation to improve the patients ability to decrease exercise-induced muscle soreness. Min Type Additional Details      []? Estim:  []? Unatt       []? IFC  []? Premod                        []?Other:  []?w/ice   []?w/heat  Position:  Location:      []? Estim: []? Att    []? TENS instruct  []? NMES                    []?Other:  []?w/US   []?w/ice   []?w/heat  Position:  Location:      []? Traction: []? Cervical       []? Lumbar                       []? Prone          []? Supine                       []?Intermittent   []? Continuous Lbs:  []? before manual  []? after manual      []? Ultrasound: []? Continuous   []? Pulsed                           []? 1MHz   []? 3MHz W/cm2:  Location:      []? Iontophoresis with dexamethasone         Location: []? Take home patch   []? In clinic    20 [x]? Ice 10 min pre and 10 min post in conjunction with patient education    []?  heat  []? Ice massage  []? Laser   []? Anodyne Position: sitting up  Location: Left shoulder      []? Laser with stim  []? Other:  Position:  Location:      []? Vasopneumatic Device  Pre-treatment girth:  Post-treatment girth:  Measured at (location):  Pressure:       []? lo []? med []? hi   Temperature: []? lo []? med []? hi    []? Skin assessment post-treatment:  []?intact []? redness- no adverse reaction    []? redness  adverse reaction:      25 min Therapeutic Exercise:  [x]? See flow sheet :   Rationale: increase ROM and increase strength to improve the patients ability to increase A/ROM and decrease pain with movement.     14 min Therapeutic Activity:  [x]? See flow sheet :   Rationale: increase strength and improve coordination  to improve the patients ability to Tolerate basic ADLs and household-related tasks without pain. 10 min Neuromuscular Re-education:  [x]? See flow sheet :   Rationale: improve coordination, improve balance and increase proprioception  to improve the patients ability to the patients ability to perform functional tasks such as overhead reach when appropriat     15 min Manual Therapy:  Grade 1-2 mobs to Left [address] joint into A/P and inf/post directions. The manual therapy interventions were performed at a separate and distinct time from the therapeutic activities interventions. Rationale: increase ROM and increase tissue extensibility to improve return to functional lifting when appropriate. With   [x]? TE   [x]? TA   [x]? neuro   []? other: Patient Education: [x]? Review HEP    [x]? Progressed/Changed HEP based on:   [x]? positioning   []? body mechanics   []? transfers   []? heat/ice application    []? other:      Other Objective/Functional Measures: AA/ROM Left shoulder flexion: 90 degrees with low treatment table stretch    Pain Level (0-10 scale) post treatment: 5/10    ASSESSMENT/Changes in Function: Patient is progressing slowly towards goals. Elevated pain levels likely due to poor sleep and not taking medications today.  Educated on the direct relationship between sleep and pain as well as different techniques to bring comfort and support to her Left shoulder during sleep. Patient will continue to benefit from skilled PT services to modify and progress therapeutic interventions, address functional mobility deficits, address ROM deficits, address strength deficits, analyze and address soft tissue restrictions, analyze and cue movement patterns, analyze and modify body mechanics/ergonomics, assess and modify postural abnormalities, address imbalance/dizziness and instruct in home and community integration to attain remaining goals. [x]  See Plan of Care  []  See progress note/recertification  []  See Discharge Summary         Progress towards goals / Updated goals:  Short Term Goals: To be accomplished in 2 treatments:  1. Pt will report compliance and independence to HEP to help the pt manage their pain and symptoms.             Eval: established  Current: MET - did them all over the weekend   [address] [facility] be accomplished in 10 treatments:  1. Pt will increase FOTO score to 64 points to improve ability to perform ADLs. Eval: 41 points  Current: reports no change yet   2. Pt will report an improvement in at best pain to at least 3/10 to improve ability to tolerate bathing. Eval: 6/10 at best  Current: 7/10 continues as constant pain even with pain medications - progressing  3. Pt will increase PROM left shoulder flex to at least 120 degs, ABD to 75 degs to improve ability to progress through protocol with increased ease. Eval: flex 50 degs, ABD 20 degs  4. Pt will report being able to don/doff a shirt independently to improve independence with dressing activities.   Eval: reports needing assistance from family with donning/doffing a shirt.     PLAN  [x]  Upgrade activities as tolerated     []  Continue plan of care  []  Update interventions per flow sheet       []  Discharge due to:_  []  Other:_      Verenice Nunn, PT 6/7/2021  2:05 PM    Future Appointments   Date Time Provider Deisy Palomo   6/9/2021  1:30 PM García rGegory, PTA [facility]   6/11/2021  1:30 PM Bruce Braun, PT [facility]   6/14/2021  1:30 PM [facility] AMB   6/14/2021  3:45 PM Felton Lobato, PT [facility]   6/16/2021 12:00 PM García Gregory, PTA [facility]   6/16/2021  3:15 PM CHERELLE Bernal [facility] BS AMB   6/18/2021  1:30 PM Felton Lobato, PT [facility]   6/21/2021  1:30 PM Bruce Braun, PT [facility]   6/22/2021  9:30 AM [facility] ECHO GE [address] [facility] BS AMB   6/23/2021  1:30 PM Felton Lobato, PT [facility]   8/16/2021 10:00 AM Lina Del Valle MD [facility] BS AMB

## 2021-06-09 ENCOUNTER — HOSPITAL ENCOUNTER (OUTPATIENT)
Dept: PHYSICAL THERAPY | Age: 56
Discharge: HOME | End: 2021-06-09
Attending: ORTHOPAEDIC SURGERY
Payer: COMMERCIAL

## 2021-06-09 PROCEDURE — 97530 THERAPEUTIC ACTIVITIES: CPT

## 2021-06-09 PROCEDURE — 97110 THERAPEUTIC EXERCISES: CPT

## 2021-06-09 PROCEDURE — 97014 ELECTRIC STIMULATION THERAPY: CPT

## 2021-06-09 PROCEDURE — 97140 MANUAL THERAPY 1/> REGIONS: CPT

## 2021-06-09 PROCEDURE — 97112 NEUROMUSCULAR REEDUCATION: CPT

## 2021-06-09 NOTE — PROGRESS NOTES
PT DAILY TREATMENT NOTE     Patient Name: Stanislav Amado  Date:2021  : 1965  [x]  Patient  Verified  Payor: [employer] / Plan: Eligio Button PPO / Product Type: PPO /    In time: 1:34 Out time:2:38  Total Treatment Time (min): 64  Visit #: 4 of 10    Treatment Area: Pain in left shoulder [M25.512]    SUBJECTIVE  Pain Level (0-10 scale): 8  Any medication changes, allergies to medications, adverse drug reactions, diagnosis change, or new procedure performed?: [x] No    [] Yes (see summary sheet for update)  Subjective functional status/changes:   [] No changes reported  Patient reports increased pain today with no specific or apparent cause; \"some days are just better than others and today isn't one of them. \" She states she is only able to sleep once she has taken pain meds; she is sleeping on her right side. OBJECTIVE    Modality rationale: decrease inflammation and decrease pain to improve the patients ability to  tolerate exercises and return to daily activity with ease.    Min Type Additional Details   15 [x] Estim:  [x]Unatt       [x]IFC  []Premod                        []Other:  [x]w/ice   []w/heat  Position: seated on plinth (HOB up FOB down)  Location: left shoulder    [] Estim: []Att    []TENS instruct  []NMES                    []Other:  []w/US   []w/ice   []w/heat  Position:  Location:    []  Traction: [] Cervical       []Lumbar                       [] Prone          []Supine                       []Intermittent   []Continuous Lbs:  [] before manual  [] after manual    []  Ultrasound: []Continuous   [] Pulsed                           []1MHz   []3MHz W/cm2:  Location:    []  Iontophoresis with dexamethasone         Location: [] Take home patch   [] In clinic   5' (start) [x]  Ice     []  heat  []  Ice massage  []  Laser   []  Anodyne Position: seated on plinth (HOB up FOB down)  Location: left shoulder    []  Laser with stim  []  Other:  Position:  Location:    []  Vasopneumatic Device  Pre-treatment girth:  Post-treatment girth:  Measured at (location):  Pressure:       [] lo [] med [] hi   Temperature: [] lo [] med [] hi   [] Skin assessment post-treatment:  []intact []redness- no adverse reaction    []redness  adverse reaction:     15 min Therapeutic Exercise:  [x] See flow sheet :   Rationale: increase ROM and increase strength to improve the patients ability to perform ADLs with ease    9 min Therapeutic Activity:  [x]  See flow sheet :   Rationale: increase strength, improve coordination and increase proprioception  to improve the patients ability to  perform daily tasks and return to PLOF     10 min Neuromuscular Re-education:  [x]  See flow sheet :   Rationale: improve coordination and increase proprioception  to improve the patients ability to stabilize, use proper body mechanics, and promote proper postural awareness    15 min Manual Therapy:  STM to rhomboid, upper and mid trap; gentle PROM within available range; [address] mobs grade 1-2 AP and inf; scap mobs/clocks as tolerated   The manual therapy interventions were performed at a separate and distinct time from the therapeutic activities interventions.   Rationale: decrease pain, increase ROM, increase tissue extensibility and decrease trigger points to stabilize, use proper body mechanics, and promote proper postural awareness        With   [x] TE   [x] TA   [x] neuro   [] other: Patient Education: [x] Review HEP    [] Progressed/Changed HEP based on:   [x] positioning   [x] body mechanics   [] transfers   [] heat/ice application    [] other:      Other Objective/Functional Measures:   Plinth positioned in chair position for seated exercises  Application of CP at beginning to ease pain during pt ed and pain assessment  Added SB rollouts in 3 directions     Pain Level (0-10 scale) post treatment: 5    ASSESSMENT/Changes in Function: Patient continues to remain guarded with PROM and AROM, she demonstrates some fear avoidance and is TTP. She is left limiting with activity that will inhibit progress; will continue to encourage movement and progressive strengthening/ROM activity. Palpable trigger points in traps and rhomboid, as well as tight bicep tendon. Patient will continue to benefit from skilled PT services to modify and progress therapeutic interventions, address functional mobility deficits, address ROM deficits, address strength deficits, analyze and address soft tissue restrictions, analyze and cue movement patterns, analyze and modify body mechanics/ergonomics and assess and modify postural abnormalities to attain remaining goals. [x]  See Plan of Care  []  See progress note/recertification  []  See Discharge Summary         Progress towards goals / Updated goals:  Short Term Goals: To be accomplished in 2 treatments:  1. Pt will report compliance and independence to HEP to help the pt manage their pain and symptoms.             Eval: established  Current: MET - did them all over the weekend   [address] [facility] be accomplished in 10 treatments:  1. Pt will increase FOTO score to 64 points to improve ability to perform ADLs. Eval: 41 points  Current: reports no change yet   2. Pt will report an improvement in at best pain to at least 3/10 to improve ability to tolerate bathing. Eval: 6/10 at best  Current: 7/10 continues as constant pain even with pain medications - progressing  3. Pt will increase PROM left shoulder flex to at least 120 degs, ABD to 75 degs to improve ability to progress through protocol with increased ease. Eval: flex 50 degs, ABD 20 degs  4. Pt will report being able to don/doff a shirt independently to improve independence with dressing activities.   Eval: reports needing assistance from family with donning/doffing a shirt.     PLAN  [x]  Upgrade activities as tolerated     [x]  Continue plan of care  []  Update interventions per flow sheet       []  Discharge due to:_  []  Other:_        Terkam L Jose Mcgowan, PTA 6/9/2021  1:23 PM    Future Appointments   Date Time Provider Deisy Palomo   6/9/2021  1:30 PM Neeru Sexton, PTA [facility]   6/11/2021  1:30 PM Sukhjinder Wilson, PT [facility]   6/14/2021  1:30 PM DR HUNTLEY VEIN AND VASCULAR [facility] BS AMB   6/14/2021  3:45 PM Gladys Dodson, PT [facility]   6/16/2021 12:00 PM Neeru Sexton, PTA [facility]   6/16/2021  3:15 PM CHERELLE Kim VS BS AMB   6/18/2021  1:30 PM Gladys Dodson, PT [facility]   6/21/2021  1:30 PM Sukhjinder Wilson, PT [facility]   6/22/2021  9:30 AM [facility] ECHO GE 95 [facility] BS AMB   6/23/2021  1:30 PM Gladys Dodson, PT [facility]   8/16/2021 10:00 AM Carmen Park MD [facility] BS AMB

## 2021-06-10 RX ORDER — FUROSEMIDE 20 MG/1
20 TABLET ORAL AS NEEDED
Qty: 30 TABLET | Refills: 1 | Status: SHIPPED | OUTPATIENT
Start: 2021-06-10 | End: 2021-07-22

## 2021-06-11 ENCOUNTER — HOSPITAL ENCOUNTER (OUTPATIENT)
Dept: PHYSICAL THERAPY | Age: 56
Discharge: HOME | End: 2021-06-11
Attending: ORTHOPAEDIC SURGERY
Payer: COMMERCIAL

## 2021-06-11 PROCEDURE — 97110 THERAPEUTIC EXERCISES: CPT | Performed by: PHYSICAL THERAPIST

## 2021-06-11 PROCEDURE — 97112 NEUROMUSCULAR REEDUCATION: CPT | Performed by: PHYSICAL THERAPIST

## 2021-06-11 PROCEDURE — 97530 THERAPEUTIC ACTIVITIES: CPT | Performed by: PHYSICAL THERAPIST

## 2021-06-11 NOTE — PROGRESS NOTES
PT DAILY TREATMENT NOTE     Patient Name: Trinda Lennox  Date:2021  : 1965  [x]  Patient  Verified  Payor: Jayla Weller / Plan: Clayton Vaughn PPO / Product Type: PPO /    In time:1:30P  Out time:2:30P  Total Treatment Time (min): 60min  Visit #: 5 of 10    Treatment Area: Pain in left shoulder [M25.512]    SUBJECTIVE  Pain Level (0-10 scale): -7/10  Any medication changes, allergies to medications, adverse drug reactions, diagnosis change, or new procedure performed?: [x] No    [] Yes (see summary sheet for update)  Subjective functional status/changes:   [] No changes reported  Patient states her pain relief after therapy sessions is lasting about an hour. OBJECTIVE  Modality rationale: decrease inflammation and decrease pain to improve the patients ability to  tolerate exercises and return to daily activity with ease. Min Type Additional Details    15 [x]? Estim:  [x]? Unatt       [x]? IFC  []? Premod                        []?Other:  [x]?w/ice   []?w/heat  Position: seated on plinth (HOB up FOB down)  Location: left shoulder      []? Estim: []? Att    []? TENS instruct  []? NMES                    []?Other:  []?w/US   []?w/ice   []?w/heat  Position:  Location:      []? Traction: []? Cervical       []? Lumbar                       []? Prone          []? Supine                       []?Intermittent   []? Continuous Lbs:  []? before manual  []? after manual      []? Ultrasound: []? Continuous   []? Pulsed                           []? 1MHz   []? 3MHz W/cm2:  Location:      []? Iontophoresis with dexamethasone         Location: []? Take home patch   []? In clinic    10 (start)  15 min post [x]? Ice     []?  heat  []? Ice massage  []? Laser   []? Anodyne Position: seated on plinth Location: left shoulder      []? Laser with stim  []? Other:  Position:  Location:      []? Vasopneumatic Device  Pre-treatment girth:  Post-treatment girth:  Measured at (location):  Pressure:       []? lo []? med []? hi   Temperature: []? lo []? med []? hi    []? Skin assessment post-treatment:  []?intact []? redness- no adverse reaction    []? redness  adverse reaction:     15 min Therapeutic Exercise:  [x]? ? See flow sheet :   Rationale: increase ROM and increase strength to improve the patients ability to increase A/ROM and decrease pain with movement.     14 min Therapeutic Activity:  [x]? ?  See flow sheet :   Rationale: increase strength and improve coordination  to improve the patients ability to Tolerate basic ADLs and household-related tasks without pain.      16 min Neuromuscular Re-education:  [x]? ?  See flow sheet :   Rationale: improve coordination, improve balance and increase proprioception  to improve the patients ability to the patients ability to perform functional tasks such as overhead reach when appropriat     With   [x]? ? TE   [x]? ? TA   [x]? ? neuro   []?? other: Patient Education: [x]? ? Review HEP    [x]? ? Progressed/Changed HEP based on:   [x]? ? positioning   []? ? body mechanics   []? ? transfers   []? ? heat/ice application    []? ? other:       Other Objective/Functional Measures: keloid scarring noted - patient education provided     Pain Level (0-10 scale) post treatment: 5/10    ASSESSMENT/Changes in Function: Patient is continuing to progress slowly. Receptive to scar massage to decrease sensitivity and improve overall function. Patient will continue to benefit from skilled PT services to modify and progress therapeutic interventions, address functional mobility deficits, address ROM deficits, address strength deficits, analyze and address soft tissue restrictions, analyze and cue movement patterns, analyze and modify body mechanics/ergonomics, assess and modify postural abnormalities, address imbalance/dizziness and instruct in home and community integration to attain remaining goals.      [x]  See Plan of Care  []  See progress note/recertification  []  See Discharge Summary Progress towards goals / Updated goals:  Short Term Goals: To be accomplished in 2 treatments:  1. Pt will report compliance and independence to [facility] to help the pt manage their pain and symptoms.             Eval: established  Current: MET - did them all over the weekend   [address] [facility] [address] be accomplished in 10 treatments:  1. Pt will increase FOTO score to 64 points to improve ability to perform ADLs. Eval: 41 points  Current: reports no change yet   2. Pt will report an improvement in at best pain to at least 3/10 to improve ability to tolerate bathing. Eval: 6/10 at best  Current: 5/10 at end of sessions - progressing  3. Pt will increase PROM left shoulder flex to at least 120 degs, ABD to 75 degs to improve ability to progress through protocol with increased ease. Eval: flex 50 degs, ABD 20 degs  4. Pt will report being able to don/doff a shirt independently to improve independence with dressing activities.   Eval: reports needing assistance from family with donning/doffing a shirt.     PLAN  [x]  Upgrade activities as tolerated     []  Continue plan of care  []  Update interventions per flow sheet       []  Discharge due to:_  []  Other:_      Debora Panda, PT 6/11/2021  4:10 PM    Future Appointments   Date Time Provider Deisy Lanei   6/14/2021  1:30 PM Val Vega MD [facility] BS AMB   6/14/2021  3:45 PM Haider Alejandra, PT [facility]   6/16/2021 12:00 PM Baron Rasheed PTA [facility]   6/16/2021  3:15 PM CHERELLE Tobar [facility] BS AMB   6/18/2021  1:30 PM Heriberto García, PT [facility]   6/21/2021  1:30 PM Selene Nunez, PT [facility]   6/22/2021  9:30 AM [facility] ECHO GE 95 [facility] BS AMB   6/23/2021  1:30 PM Haider Alejandra, PT [facility]   8/16/2021 10:00 AM Katie Ceja MD [facility] BS AMB

## 2021-06-14 ENCOUNTER — OFFICE VISIT (OUTPATIENT)
Dept: VASCULAR SURGERY | Age: 56
End: 2021-06-14
Payer: COMMERCIAL

## 2021-06-14 ENCOUNTER — HOSPITAL ENCOUNTER (OUTPATIENT)
Dept: PHYSICAL THERAPY | Age: 56
Discharge: HOME | End: 2021-06-14
Attending: ORTHOPAEDIC SURGERY
Payer: COMMERCIAL

## 2021-06-14 VITALS
WEIGHT: 293 LBS | SYSTOLIC BLOOD PRESSURE: 124 MMHG | RESPIRATION RATE: 22 BRPM | HEIGHT: 67 IN | HEART RATE: 76 BPM | BODY MASS INDEX: 45.99 KG/M2 | DIASTOLIC BLOOD PRESSURE: 82 MMHG | OXYGEN SATURATION: 100 %

## 2021-06-14 DIAGNOSIS — E66.01 OBESITY, MORBID (HCC): Primary | ICD-10-CM

## 2021-06-14 PROCEDURE — 97140 MANUAL THERAPY 1/> REGIONS: CPT

## 2021-06-14 PROCEDURE — 97110 THERAPEUTIC EXERCISES: CPT

## 2021-06-14 PROCEDURE — 99204 OFFICE O/P NEW MOD 45 MIN: CPT | Performed by: SURGERY

## 2021-06-14 PROCEDURE — 97014 ELECTRIC STIMULATION THERAPY: CPT

## 2021-06-14 RX ORDER — TRAMADOL HYDROCHLORIDE 50 MG/1
50 TABLET ORAL
COMMUNITY
End: 2021-08-16

## 2021-06-14 NOTE — PROGRESS NOTES
PT DAILY TREATMENT NOTE     Patient Name: Sammy Jensen  Date:2021  : 1965  [x]  Patient  Verified  Payor: Shane Simon / Plan: Etelvina Valenzuela PPO / Product Type: PPO /    In time: 3:34  Out time:4:36  Total Treatment Time (min): 62  Visit #: 6 of 10    Treatment Area: Pain in left shoulder [M25.512]    SUBJECTIVE  Pain Level (0-10 scale): 8  Any medication changes, allergies to medications, adverse drug reactions, diagnosis change, or new procedure performed?: [x] No    [] Yes (see summary sheet for update)  Subjective functional status/changes:   [] No changes reported  Pt reports she noticed a heavy feeling in the left shoulder this morning. OBJECTIVE    Modality rationale: decrease edema, decrease inflammation and decrease pain to improve the patients ability to tolerate functional tasks.     Min Type Additional Details   15 [x] Estim:  [x]Unatt       [x]IFC  []Premod                        []Other:  [x]w/ice   []w/heat  Position: sitting on plinth with HOB elevated   Location: left shoulder    [] Estim: []Att    []TENS instruct  []NMES                    []Other:  []w/US   []w/ice   []w/heat  Position:  Location:    []  Traction: [] Cervical       []Lumbar                       [] Prone          []Supine                       []Intermittent   []Continuous Lbs:  [] before manual  [] after manual    []  Ultrasound: []Continuous   [] Pulsed                           []1MHz   []3MHz W/cm2:  Location:    []  Iontophoresis with dexamethasone         Location: [] Take home patch   [] In clinic    []  Ice     []  heat  []  Ice massage  []  Laser   []  Anodyne Position:  Location:    []  Laser with stim  []  Other:  Position:  Location:    []  Vasopneumatic Device  Pre-treatment girth:  Post-treatment girth:  Measured at (location):  Pressure:       [] lo [] med [] hi   Temperature: [] lo [] med [] hi   [] Skin assessment post-treatment:  []intact []redness- no adverse reaction    []redness  adverse reaction:     32 min Therapeutic Exercise:  [x] See flow sheet : exercises, PROM measurements. Rationale: increase ROM and increase strength to improve the patients ability to tolerate ADLs    15 min Manual Therapy: In supine: PROM with gentle overpressure and oscillations into left shoulder flex/ABD/ER/IR; in sitting: DTM/TPR to left infraspinatus/supraspinatus and left scap mobs in all planes. The manual therapy interventions were performed at a separate and distinct time from the therapeutic activities interventions. Rationale: decrease pain, increase ROM, increase tissue extensibility, decrease edema , decrease trigger points and increase postural awareness to improve ease of mobility. With   [x] TE   [] TA   [] neuro   [] other: Patient Education: [x] Review HEP    [] Progressed/Changed HEP based on:   [x] positioning   [x] body mechanics   [] transfers   [] heat/ice application    [] other:      Other Objective/Functional Measures: PROM left shoulder flex 90 degs, ABD 30 degs, IR to stomach in neutral, ER to neutral in neutral (pain with all ranges). Pain Level (0-10 scale) post treatment: \"numb\" and cold from the ice    ASSESSMENT/Changes in Function: Pt did not report a [pain number post session but stated it was \"numb\" and cold from the ice. Pt continues to demonstrate very limited and painful PROM of the left shoulder. Pt was able to relax her left shoulder slightly with PROM and therapist was able to achieve more PROM. Pt has a PA appointment on 6/16/2021 and educated pt to mention her elevated pain levels to the PA. Continue POC as tolerated per protocol to improve pain and ROM.      Patient will continue to benefit from skilled PT services to modify and progress therapeutic interventions, address functional mobility deficits, address ROM deficits, address strength deficits, analyze and address soft tissue restrictions, analyze and cue movement patterns, analyze and modify body mechanics/ergonomics, assess and modify postural abnormalities and instruct in home and community integration to attain remaining goals. []  See Plan of Care  []  See progress note/recertification  []  See Discharge Summary         Progress towards goals / Updated goals:  Short Term Goals: To be accomplished in 2 treatments:  1. Pt will report compliance and independence to HEP to help the pt manage their pain and symptoms.             Eval: established  Current: MET - did them all over the weekend   [address] [facility] [address] be accomplished in 10 treatments:  1. Pt will increase FOTO score to 64 points to improve ability to perform ADLs. Eval: 41 points  Current: reports no change yet   2. Pt will report an improvement in at best pain to at least 3/10 to improve ability to tolerate bathing. Eval: 6/10 at best  Current: 5/10 at end of sessions - progressing  3. Pt will increase PROM left shoulder flex to at least 120 degs, ABD to 75 degs to improve ability to progress through protocol with increased ease. Eval: flex 50 degs, ABD 20 degs  Current: PROM left shoulder flex 90 degs, ABD 30 degs, IR to stomach in neutral, ER to neutral in neutral (pain with all ranges) 6/14/2021  4. Pt will report being able to don/doff a shirt independently to improve independence with dressing activities.   Eval: reports needing assistance from family with donning/doffing a shirt.   Current: not met, continues to have pain with dressing 6/14/2021    PLAN  [x]  Upgrade activities as tolerated     [x]  Continue plan of care  [x]  Update interventions per flow sheet       []  Discharge due to:_  []  Other:_      Adarsh Up PT 6/14/2021  3:50 PM    Future Appointments   Date Time Provider Deisy Palomo   6/14/2021  3:45 PM Claudio Nunes PT [facility]   6/16/2021 12:00 PM Mikayla Amador PTA [facility]   6/16/2021  3:15 PM CHERELLE Grijalva [facility] BS AMB   6/18/2021  1:30 PM dAiti Kraft PT [facility]   6/21/2021 1:30 PM Ash Vizcaino, PT [facility]   6/22/2021  9:30 AM [facility] ECHO GE 95 [facility] BS AMB   6/23/2021  1:30 PM Monico Carlos, PT [facility]   8/16/2021 10:00 AM José Miguel Taylor MD [facility] BS AMB

## 2021-06-14 NOTE — PROGRESS NOTES
Jane Alex    Chief Complaint   Patient presents with    New Patient    Leg Pain    Swelling       History and Physical    Jane Alex is a 64 y.o. female with super morbid obesity and concern of possible lower extremity edema causing pain. When discussing the pain further, the patient indicates that her pain is bilateral on the anterior lower knee area. The pain is sharp and excruciating when she is standing and gets worse with any walking. It is better when she is resting, sitting or laying down. This pain started after shoulder surgery. It started mainly on the left side but since then has progressed to both sides hurting just as much. The patient is 5 7 and has a BMI of 68.6. She weighs 438 pounds. She was concerned about lymphedema but does not have any history of dorsal foot swelling or toe swelling. No history of ulcerations. Her skin is actually soft with no evidence of lipodermatosclerosis. She is concerned about an overhanging portion of her distal thigh which she was concerned could have indicated lymphedema. This area is adipose tissue. Though her legs are large, she does not have any significant swelling today. She does take Lasix 40 mg daily when she is at home. She is having more more trouble ambulating and is now using a cane when she ambulates. I have discussed with her that her pain is musculoskeletal and not from edema. She does not have enough edema to cause pain. Unfortunately the patient is overweight to the point of almost becoming an invalid. We have discussed bariatric surgery and the patient is interested in going for an evaluation. I will refer her to bariatric surgery. She does not have evidence of lymphedema and would not benefit from lymphedema treatment currently.             Past Medical History:   Diagnosis Date    Adverse effect of anesthesia     \"Epidural\" allergy    Anemia     anemia    Asthma     Lymphedema     R leg    Obesity     Obesity     Pulmonary emboli [facility])      Past Surgical History:   Procedure Laterality Date    HX  SECTION      HX ROTATOR CUFF REPAIR Left 2021     Patient Active Problem List   Diagnosis Code    [facility] E66.01     Current Outpatient Medications   Medication Sig Dispense Refill    traMADoL (ULTRAM) 50 mg tablet Take 50 mg by mouth every six (6) hours as needed for Pain.  furosemide (LASIX) 20 mg tablet TAKE 1 TAB BY MOUTH AS NEEDED (EDEMA). 30 Tablet 1    cyclobenzaprine (FLEXERIL) 10 mg tablet Take 1 Tablet by mouth nightly. Indications: muscle spasm 30 Tablet 0    apixaban (Eliquis DVT-PE Treat 30D Start) 5 mg (74 tabs) starter pack Take 10 mg (two 5 mg tablets) by mouth twice a day for 7 days   Followed by 5 mg (one 5 mg tablet) by mouth twice a day 1 Dose Pack 0    multivitamin (ONE A DAY) tablet Take 1 Tab by mouth daily.  cyanocobalamin, vitamin B-12, (Vitamin B-12) 5,000 mcg subl 5,000 mcg by SubLINGual route daily. Dissolvable      acetaminophen (Tylenol Extra Strength) 500 mg tablet Take  by mouth every six (6) hours as needed for Pain.        Allergies   Allergen Reactions    Other Medication Itching     Patient states \"Epidural\"     Tetanus And Diphtheria Toxoids Hives and Nausea and Vomiting     Social History     Socioeconomic History    Marital status:      Spouse name: Not on file    Number of children: Not on file    Years of education: Not on file    Highest education level: Not on file   Occupational History    Not on file   Tobacco Use    Smoking status: Never Smoker    Smokeless tobacco: Never Used   Vaping Use    Vaping Use: Never used   Substance and Sexual Activity    Alcohol use: No    Drug use: Never    Sexual activity: Not on file   Other Topics Concern    Not on file   Social History Narrative    ** Merged History Encounter **          Social Determinants of Health     Financial Resource Strain:     Difficulty of Paying Living Expenses:    Food Insecurity:     Worried About [address] in the Last Year:     [address] [facility] in the Last Year:    Transportation Needs:     Lack of Transportation (Medical):  Lack of Transportation (Non-Medical):    Physical Activity:     Days of Exercise per Week:     Minutes of Exercise per Session:    Stress:     Feeling of Stress :    Social Connections:     Frequency of Communication with Friends and Family:     Frequency of Social Gatherings with Friends and Family:     Attends Sikhism Services:     Active Member of Clubs or Organizations:     Attends Club or Organization Meetings:     Marital Status:    Intimate Partner Violence:     Fear of Current or Ex-Partner:     Emotionally Abused:     Physically Abused:     Sexually Abused:       Family History   Problem Relation Age of Onset    Cancer Mother     Diabetes Mother     Hypertension Mother     Cancer Father     No Known Problems Brother        Physical Exam:    Visit Vitals  /82 (BP 1 Location: Left upper arm, BP Patient Position: Sitting)   Pulse 76   Resp 22   Ht 5' 7\" (1.702 m)   Wt (!) 438 lb (198.7 kg)   SpO2 100%   BMI 68.60 kg/m²        Constitutional:  Patient is well developed, well nourished, and not distressed. HEENT: atraumatic, normocephalic, wearing a mask. Eyes:   Cunjunctivae clear, no scleral icterus  Neck:   No JVD present. There is no Carotid bruit. Cardiovascular:  Normal rate, regular rhythm, normal heart sounds. No murmur heard. Pulses:       Right:      Radial         2+    Dorsalis      2+    Post Tib      2+ Left:        Radial         2+    Dorsalis      2+    Post Tib      2+       Pulmonary/Chest: Effort normal and breath sounds normal.  she has no wheezes or no rales. Abdominal:  Bowel sounds are present. Soft. No tenderness to palpation. Extremities: Normal range of motion. No edema.  Digits no cyanosis or clubbing  Neurological:  she  is alert and oriented x3 . Gait normal. Motor & sensory grossly intact in all 4 limbs. Psych: Appropriate mood and affect. Skin:  Skin is warm and dry. No rash noted. No erythema. No ulcers. Impression and Plan:  Fco Fuller is a 64 y.o. female with super morbid obesity with a BMI of 68. 6. No evidence of lymphedema bilateral lower extremities. Refer to bariatric surgery. We reviewed the plan with the patient and the patient understands. Carey Chavis MD    PLEASE NOTE:  This document has been produced using voice recognition software. Unrecognized errors in transcription may be present.

## 2021-06-14 NOTE — PROGRESS NOTES
1. Have you been to an emergency room or urgent care clinic since your last visit? No    Hospitalized since your last visit? If yes, where, when, and reason for visit? NO  2. Have you seen or consulted any other health care providers outside of the [facility] since your last visit including any procedures, health maintenance items. If yes, where, when and reason for visit?  NO       Lymphedema Leg Measurements are as Followed    Left Ankle 30 cm   Right Ankle 29 cm    Left Calf 44 cm  Right Calf 51 cm    Left Knee 65 cm  Right Knee 63 cm    Left Thigh 100.3 cm  Right Thigh 107.0 cm

## 2021-06-15 NOTE — PROGRESS NOTES
Rehana Francis  1965     HISTORY OF PRESENT ILLNESS  Rehana Francis is a 64 y.o. female who presents today for evaluation s/p Left shoulder arthroscopic rotator cuff repair, upper border subscapularis and supraspinatus, glenohumeral joint debridement on 4/12/21. Patient has been going to PT. Describes pain as a 6/10. Has been taking tramadol for pain. Still has night pain. She has been compliant with her exercises and restrictions. She reports doing her exercises 2-3 times a day and going to PT 3 times a week. Patient denies any fever, chills, chest pain, shortness of breath or calf pain. There are no new illness or injuries to report since last seen in the office. PHYSICAL EXAM:   Visit Vitals  Pulse 97   Temp 97.5 °F (36.4 °C) (Temporal)   Resp 16   Ht 5' 7\" (1.702 m)   Wt (!) 423 lb (191.9 kg)   SpO2 97%   BMI 66.25 kg/m²      The patient is a well-developed, well-nourished female in no acute distress. The patient is alert and oriented times three. The patient appears to be well groomed. Mood and affect are normal.  ORTHOPEDIC EXAM of left shoulder:  Inspection: swelling not present,  Bruising not present  Incision well healed  Passive glenohumeral abduction 0-40 degrees, 90 PFF, 20 PER  Stability: Stable  Strength: n/a  2+ distal pulses    IMPRESSION:  s/p Left shoulder arthroscopic rotator cuff repair, upper border subscapularis and supraspinatus, glenohumeral joint debridement    PLAN:   Pt doing well post operatively  She is stiff on exam, I would like to see her further along. This is likely causing her increased pain. Will continue PT and exercises now, order placed today. Stressed to patient that nothing causes an increase in pain. Pt not given a refill of pain medication today. Given a note to remain out of work due to her job not having light duty.   RTC 4 weeks    PREETHI Stuart [facility]

## 2021-06-16 ENCOUNTER — HOSPITAL ENCOUNTER (OUTPATIENT)
Dept: PHYSICAL THERAPY | Age: 56
Discharge: HOME | End: 2021-06-16
Attending: ORTHOPAEDIC SURGERY
Payer: COMMERCIAL

## 2021-06-16 ENCOUNTER — OFFICE VISIT (OUTPATIENT)
Dept: ORTHOPEDIC SURGERY | Age: 56
End: 2021-06-16
Payer: COMMERCIAL

## 2021-06-16 VITALS
HEIGHT: 67 IN | OXYGEN SATURATION: 97 % | BODY MASS INDEX: 45.99 KG/M2 | HEART RATE: 97 BPM | RESPIRATION RATE: 16 BRPM | TEMPERATURE: 97.5 F | WEIGHT: 293 LBS

## 2021-06-16 DIAGNOSIS — S46.102A INJURY OF TENDON OF LONG HEAD OF LEFT BICEPS, INITIAL ENCOUNTER: ICD-10-CM

## 2021-06-16 DIAGNOSIS — M75.122 NONTRAUMATIC COMPLETE TEAR OF LEFT ROTATOR CUFF: Primary | ICD-10-CM

## 2021-06-16 DIAGNOSIS — Z98.890 STATUS POST ARTHROSCOPY OF LEFT SHOULDER: ICD-10-CM

## 2021-06-16 PROCEDURE — 97110 THERAPEUTIC EXERCISES: CPT

## 2021-06-16 PROCEDURE — 99024 POSTOP FOLLOW-UP VISIT: CPT | Performed by: ORTHOPAEDIC SURGERY

## 2021-06-16 PROCEDURE — 97032 APPL MODALITY 1+ESTIM EA 15: CPT

## 2021-06-16 PROCEDURE — 97140 MANUAL THERAPY 1/> REGIONS: CPT

## 2021-06-16 NOTE — PROGRESS NOTES
PT DAILY TREATMENT NOTE     Patient Name: Amanda Heredia  Date:2021  : 1965  [x]  Patient  Verified  Payor: Danelle Quiñones / Plan: Nasim Delgado PPO / Product Type: PPO /    In time: 12:00  Out time: 12:51   Total Treatment Time (min): 51  Visit #: 7 of 10    Treatment Area: Pain in left shoulder [M25.512]    SUBJECTIVE  Pain Level (0-10 scale): 6  Any medication changes, allergies to medications, adverse drug reactions, diagnosis change, or new procedure performed?: [x] No    [] Yes (see summary sheet for update)  Subjective functional status/changes:   [] No changes reported  Patient reports she sees the PA today; at this time she continues to have the same. She states the estim and ice helps a lot and feels good when she leaves even if it doesn't last long enough. OBJECTIVE    Modality rationale: decrease inflammation and decrease pain to improve the patients ability to  tolerate exercises and return to daily activity with ease.    Min Type Additional Details   15 [x] Estim:  [x]Unatt       [x]IFC  []Premod                        []Other:  [x]w/ice   []w/heat  Position: longsit  Location: left shoulder    [] Estim: []Att    []TENS instruct  []NMES                    []Other:  []w/US   []w/ice   []w/heat  Position:  Location:    []  Traction: [] Cervical       []Lumbar                       [] Prone          []Supine                       []Intermittent   []Continuous Lbs:  [] before manual  [] after manual    []  Ultrasound: []Continuous   [] Pulsed                           []1MHz   []3MHz W/cm2:  Location:    []  Iontophoresis with dexamethasone         Location: [] Take home patch   [] In clinic    []  Ice     []  heat  []  Ice massage  []  Laser   []  Anodyne Position:  Location:    []  Laser with stim  []  Other:  Position:  Location:    []  Vasopneumatic Device  Pre-treatment girth:  Post-treatment girth:  Measured at (location):  Pressure:       [] lo [] med [] hi   Temperature: [] lo [] med [] hi   [] Skin assessment post-treatment:  []intact []redness- no adverse reaction    []redness  adverse reaction:     23 min Therapeutic Exercise:  [x] See flow sheet :   Rationale: increase ROM and increase strength to improve the patients ability to perform ADLs with ease    13 min Manual Therapy:  GENTLE GHJ mobs AP and ACJ and clavicular mobs; STM to bicep and UT   The manual therapy interventions were performed at a separate and distinct time from the therapeutic activities interventions. Rationale: decrease pain, increase ROM, increase tissue extensibility and decrease trigger points to to increase mobility and reduce restriction with ADLs         With   [x] TE   [x] TA   [] neuro   [] other: Patient Education: [x] Review HEP    [] Progressed/Changed HEP based on:   [x] positioning   [x] body mechanics   [] transfers   [] heat/ice application    [] other:      Other Objective/Functional Measures:   No tolerance for scapular mobs;   Multiple trigger points in bicep    Pain Level (0-10 scale) post treatment: 4    ASSESSMENT/Changes in Function: Patient exhibits little to no tolerance for therapeutic interventions. She was able to complete exercises today with c/o pain throughout the shoulder. TTP in UT, bicep, and along medial border of scapula; manual delivered with minimal pressure and unable to achieve effective PROM d/t patient being on high guard. Patient will continue to benefit from skilled PT services to modify and progress therapeutic interventions, address functional mobility deficits, address ROM deficits, address strength deficits, analyze and address soft tissue restrictions, analyze and cue movement patterns, analyze and modify body mechanics/ergonomics and assess and modify postural abnormalities to attain remaining goals.      [x]  See Plan of Care  []  See progress note/recertification  []  See Discharge Summary         Progress towards goals / Updated goals:  Short Term Goals: To be accomplished in 2 treatments:  1. Pt will report compliance and independence to HEP to help the pt manage their pain and symptoms.             Eval: established  Current: MET - did them all over the weekend   [facility] accomplished in 10 treatments:  1. Pt will increase FOTO score to 64 points to improve ability to perform ADLs. Eval: 41 points  Current: reports no change yet   2. Pt will report an improvement in at best pain to at least 3/10 to improve ability to tolerate bathing. Eval: 6/10 at best  Current: 5/10 at end of sessions - progressing  3. Pt will increase PROM left shoulder flex to at least 120 degs, ABD to 75 degs to improve ability to progress through protocol with increased ease. Eval: flex 50 degs, ABD 20 degs  Current: PROM left shoulder flex 90 degs, ABD 30 degs, IR to stomach in neutral, ER to neutral in neutral (pain with all ranges) 6/14/2021  4. Pt will report being able to don/doff a shirt independently to improve independence with dressing activities.   Eval: reports needing assistance from family with donning/doffing a shirt.   Current: not met, continues to have pain with dressing 6/14/2021    PLAN  [x]  Upgrade activities as tolerated     [x]  Continue plan of care  []  Update interventions per flow sheet       []  Discharge due to:_  []  Other:_      Hilda Guerrero PTA 6/16/2021  9:08 AM    Future Appointments   Date Time Provider Deisy Lanei   6/16/2021 12:00 PM Gregg Harmon PTA [facility]   6/16/2021  3:15 PM CHERELLE Kelly [facility] BS AMB   6/18/2021  1:30 PM Mustapha Guerrero, PT [facility]   6/21/2021  1:30 PM Elie Cornelius PT [facility]   6/22/2021  9:30 AM [facility] BS AMB   6/23/2021  1:30 PM Yesenia Lane, PT [facility]   8/16/2021 10:00 AM MD [facility] BS AMB

## 2021-06-17 RX ORDER — CYCLOBENZAPRINE HCL 10 MG
10 TABLET ORAL
Qty: 30 TABLET | Refills: 0 | Status: SHIPPED | OUTPATIENT
Start: 2021-06-17

## 2021-06-18 ENCOUNTER — HOSPITAL ENCOUNTER (OUTPATIENT)
Dept: PHYSICAL THERAPY | Age: 56
Discharge: HOME | End: 2021-06-18
Attending: ORTHOPAEDIC SURGERY
Payer: COMMERCIAL

## 2021-06-18 PROCEDURE — 97110 THERAPEUTIC EXERCISES: CPT

## 2021-06-18 PROCEDURE — 97140 MANUAL THERAPY 1/> REGIONS: CPT

## 2021-06-18 PROCEDURE — 97014 ELECTRIC STIMULATION THERAPY: CPT

## 2021-06-18 NOTE — PROGRESS NOTES
PT DAILY TREATMENT NOTE     Patient Name: Samira Duran  Date:2021  : 1965  [x]  Patient  Verified  Payor: Yobani Zavala / Plan: Allen Baca PPO / Product Type: PPO /    In time:130  Out time:228  Total Treatment Time (min): 58  Visit #: 8 of 10         Treatment Area: Pain in left shoulder [M25.512]    SUBJECTIVE  Pain Level (0-10 scale): 6  Any medication changes, allergies to medications, adverse drug reactions, diagnosis change, or new procedure performed?: [x] No    [] Yes (see summary sheet for update)  Subjective functional status/changes:   [] No changes reported  \"I forgot the new paper from the doctor. I will bring it in on Monday. \"    OBJECTIVE    Modality rationale: decrease pain and increase tissue extensibility to improve the patients ability to tolerate ADLs and activities   Min Type Additional Details   15 [x] Estim:  [x]Unatt       [x]IFC  []Premod                        []Other:  [x]w/ice   []w/heat  Position:reclined  Location:left shoulder    [] Estim: []Att    []TENS instruct  []NMES                    []Other:  []w/US   []w/ice   []w/heat  Position:  Location:    []  Traction: [] Cervical       []Lumbar                       [] Prone          []Supine                       []Intermittent   []Continuous Lbs:  [] before manual  [] after manual    []  Ultrasound: []Continuous   [] Pulsed                           []1MHz   []3MHz W/cm2:  Location:    []  Iontophoresis with dexamethasone         Location: [] Take home patch   [] In clinic    []  Ice     []  heat  []  Ice massage  []  Laser   []  Anodyne Position:  Location:    []  Laser with stim  []  Other:  Position:  Location:    []  Vasopneumatic Device    []  Right     []  Left  Pre-treatment girth:  Post-treatment girth:  Measured at (location):  Pressure:       [] lo [] med [] hi   Temperature: [] lo [] med [] hi   [] Skin assessment post-treatment:  []intact []redness- no adverse reaction    []redness  adverse reaction:          30 min Therapeutic Exercise:  [x] See flow sheet :   Rationale: increase ROM, increase strength and improve coordination to improve the patients ability to tolerate ADLS and activities       13 min Manual Therapy:  Gentle ROM all planes left shoulder as tolerated in reclined position, gentle LAD and oscillations as tolerated   The manual therapy interventions were performed at a separate and distinct time from the therapeutic activities interventions. Rationale: decrease pain, increase ROM and increase tissue extensibility to tolerate ADLS and activities           With   [] TE   [] TA   [] neuro   [] other: Patient Education: [x] Review HEP    [] Progressed/Changed HEP based on:   [] positioning   [] body mechanics   [] transfers   [] heat/ice application    [] other:      Other Objective/Functional Measures: VC execises and tech with rest periods as needed for pain. Exercises initiated by exercise tech     Pain Level (0-10 scale) post treatment: <6 \"a little better\"    ASSESSMENT/Changes in Function: decrease tolerance to manual due to pain , painful end feels. Reports she saw MD and has new order. Discussed POC and that order was to possibly advance to AAROM phase. Patient will continue to benefit from skilled PT services to modify and progress therapeutic interventions, address ROM deficits, address strength deficits, analyze and address soft tissue restrictions, analyze and cue movement patterns, analyze and modify body mechanics/ergonomics, assess and modify postural abnormalities and instruct in home and community integration to attain remaining goals. [x]  See Plan of Care  []  See progress note/recertification  []  See Discharge Summary         Progress towards goals / Updated goals:   Short Term Goals: To be accomplished in 2 treatments:  1. Pt will report compliance and independence to HEP to help the pt manage their pain and symptoms.             Eval: established  Current: MET - did them all over the weekend   [address] [facility] be accomplished in 10 treatments:  1. Pt will increase FOTO score to 64 points to improve ability to perform ADLs. Eval: 41 points  Current: reports no change yet   2. Pt will report an improvement in at best pain to at least 3/10 to improve ability to tolerate bathing. Eval: 6/10 at best  Current: 5/10 at end of sessions - progressing  3. Pt will increase PROM left shoulder flex to at least 120 degs, ABD to 75 degs to improve ability to progress through protocol with increased ease. Eval: flex 50 degs, ABD 20 degs  Current: PROM left shoulder flex 90 degs, ABD 30 degs, IR to stomach in neutral, ER to neutral in neutral (pain with all ranges) 6/14/2021  4. Pt will report being able to don/doff a shirt independently to improve independence with dressing activities.   Eval: reports needing assistance from family with donning/doffing a shirt.   Current: not met, continues to have pain with dressing 6/14/2021       PLAN  [x]  Upgrade activities as tolerated     [x]  Continue plan of care  []  Update interventions per flow sheet       []  Discharge due to:_  []  Other:_      En Ye, PT 6/18/2021  1:44 PM    Future Appointments   Date Time Provider Deisy Palomo   6/21/2021  1:30 PM Kirby Fischer, PT [facility]   6/22/2021  9:30 AM [facility] BS AMB   6/23/2021  1:30 PM Poly Friday, PTA [facility]   7/16/2021  2:15 PM CHERELLE Wade [facility] BS AMB   8/16/2021 10:00 AM Trev Adams MD [facility] BS AMB

## 2021-06-21 ENCOUNTER — APPOINTMENT (OUTPATIENT)
Dept: PHYSICAL THERAPY | Age: 56
End: 2021-06-21
Attending: ORTHOPAEDIC SURGERY
Payer: COMMERCIAL

## 2021-06-22 LAB
ECHO LA AREA 4C: 22.01 CM2
ECHO LA VOL 2C: 89.42 ML (ref 22–52)
ECHO LA VOL 4C: 66.33 ML (ref 22–52)
ECHO LA VOL BP: 88.96 ML (ref 22–52)
ECHO LA VOL/BSA BIPLANE: 32 ML/M2 (ref 16–28)
ECHO LA VOLUME INDEX A2C: 32.17 ML/M2 (ref 16–28)
ECHO LA VOLUME INDEX A4C: 23.86 ML/M2 (ref 16–28)
ECHO LV E' LATERAL VELOCITY: 6.61 CM/S
ECHO LV E' SEPTAL VELOCITY: 6.22 CM/S
ECHO LV INTERNAL DIMENSION DIASTOLIC: 4.79 CM (ref 3.9–5.3)
ECHO LV INTERNAL DIMENSION SYSTOLIC: 3.37 CM
ECHO LV IVSD: 1.12 CM (ref 0.6–0.9)
ECHO LV MASS 2D: 201.9 G (ref 67–162)
ECHO LV MASS INDEX 2D: 72.6 G/M2 (ref 43–95)
ECHO LV POSTERIOR WALL DIASTOLIC: 1.15 CM (ref 0.6–0.9)
ECHO LVOT PEAK GRADIENT: 4.89 MMHG
ECHO LVOT PEAK VELOCITY: 110.55 CM/S
ECHO LVOT VTI: 21.29 CM
ECHO MV A VELOCITY: 59.5 CM/S
ECHO MV E DECELERATION TIME (DT): 168.06 MS
ECHO MV E VELOCITY: 42.01 CM/S
ECHO MV E/A RATIO: 0.71
ECHO MV E/E' LATERAL: 6.36
ECHO MV E/E' RATIO (AVERAGED): 6.55
ECHO MV E/E' SEPTAL: 6.75
ECHO PVEIN A DURATION: 116.03 MS
ECHO PVEIN A VELOCITY: 41.14 CM/S
ECHO RV TAPSE: 2.38 CM (ref 1.5–2)
ECHO TV REGURGITANT MAX VELOCITY: 276.25 CM/S
ECHO TV REGURGITANT PEAK GRADIENT: 30.53 MMHG
LVOT MG: 2.84 MMHG

## 2021-06-23 ENCOUNTER — HOSPITAL ENCOUNTER (OUTPATIENT)
Dept: PHYSICAL THERAPY | Age: 56
Discharge: HOME | End: 2021-06-23
Attending: ORTHOPAEDIC SURGERY
Payer: COMMERCIAL

## 2021-06-23 PROCEDURE — 97530 THERAPEUTIC ACTIVITIES: CPT

## 2021-06-23 PROCEDURE — 97112 NEUROMUSCULAR REEDUCATION: CPT

## 2021-06-23 PROCEDURE — 97140 MANUAL THERAPY 1/> REGIONS: CPT

## 2021-06-23 PROCEDURE — 97110 THERAPEUTIC EXERCISES: CPT

## 2021-06-23 NOTE — PROGRESS NOTES
PT DAILY TREATMENT NOTE     Patient Name: Anel Diaz  Date:2021  : 1965  [x]  Patient  Verified  Payor: Yolanda Melendez / Plan: Ned Randall PPO / Product Type: PPO /    In time: 1:30  Out time:2:24  Total Treatment Time (min): 54  Visit #: 2 of 10    Treatment Area: Pain in left shoulder [M25.512]    SUBJECTIVE  Pain Level (0-10 scale): 5  Any medication changes, allergies to medications, adverse drug reactions, diagnosis change, or new procedure performed?: [x] No    [] Yes (see summary sheet for update)  Subjective functional status/changes:   [] No changes reported  Patient reports she is doing ok, the pain was really bad the other day so she didn't come in. OBJECTIVE    Modality rationale: decrease inflammation and decrease pain to improve the patients ability to tolerate exercises and return to daily activity with ease.    Min Type Additional Details    [] Estim:  []Unatt       []IFC  []Premod                        []Other:  []w/ice   []w/heat  Position:  Location:    [] Estim: []Att    []TENS instruct  []NMES                    []Other:  []w/US   []w/ice   []w/heat  Position:  Location:    []  Traction: [] Cervical       []Lumbar                       [] Prone          []Supine                       []Intermittent   []Continuous Lbs:  [] before manual  [] after manual    []  Ultrasound: []Continuous   [] Pulsed                           []1MHz   []3MHz W/cm2:  Location:    []  Iontophoresis with dexamethasone         Location: [] Take home patch   [] In clinic   10 []  Ice     [x]  heat  []  Ice massage  []  Laser   []  Anodyne Position: reclined  Location: left shoulder    []  Laser with stim  []  Other:  Position:  Location:    []  Vasopneumatic Device    []  Right     []  Left  Pre-treatment girth:  Post-treatment girth:  Measured at (location):  Pressure:       [] lo [] med [] hi   Temperature: [] lo [] med [] hi   [] Skin assessment post-treatment:  []intact []redness- no adverse reaction    []redness  adverse reaction:     18 min Therapeutic Exercise:  [x] See flow sheet :   Rationale: increase ROM and increase strength to improve the patients ability to perform ADLs with ease    8 min Therapeutic Activity:  [x]  See flow sheet :   Rationale: increase strength, improve coordination and increase proprioception  to improve the patients ability to  perform daily tasks and return to PLOF     10 min Neuromuscular Re-education:  [x]  See flow sheet :   Rationale: improve coordination and increase proprioception  to improve the patients ability to stabilize, use proper body mechanics, and promote proper postural awareness    8 min Manual Therapy:  Gentle PROM through available range in flexion and abduction in reclined position. STM of left UT, ant and middle delt, into bicep. The manual therapy interventions were performed at a separate and distinct time from the therapeutic activities interventions. Rationale: decrease pain, increase ROM, increase tissue extensibility and decrease trigger points to to increase mobility and reduce restriction with ADLs       With   [x] TE   [x] TA   [x] neuro   [] other: Patient Education: [x] Review HEP    [] Progressed/Changed HEP based on:   [x] positioning   [x] body mechanics   [] transfers   [] heat/ice application    [] other:      Other Objective/Functional Measures:   Pt provided new order, continue with current protocol and no strengthening until 7/12/21     Pain Level (0-10 scale) post treatment: 4    ASSESSMENT/Changes in Function: Patient arrived with slightly decreased pain, she continues to have fluctuating pain day to day. Very TTP within the anterior deltoid and mildly into UT and mid delt. She presents with greater ease in PROM into flexion than abduction, however increased symptoms with lowering in all planes.  Patient reports she is able to complete most of her dressing ADLs, however continues to need assistance with donning her bra and shirt. Patient will continue to benefit from skilled PT services to modify and progress therapeutic interventions, address functional mobility deficits, address ROM deficits, address strength deficits, analyze and address soft tissue restrictions, analyze and cue movement patterns, analyze and modify body mechanics/ergonomics and assess and modify postural abnormalities to attain remaining goals. [x]  See Plan of Care  []  See progress note/recertification  []  See Discharge Summary         Progress towards goals / Updated goals:   Short Term Goals: To be accomplished in 2 treatments:  1. Pt will report compliance and independence to [facility] to help the pt manage their pain and symptoms.             Eval: established  Current: MET - did them all over the weekend   [address] [facility] [address] be accomplished in 10 treatments:  1. Pt will increase FOTO score to 64 points to improve ability to perform ADLs. Eval: 41 points  Current: reports no change yet   2. Pt will report an improvement in at best pain to at least 3/10 to improve ability to tolerate bathing. Eval: 6/10 at best  Current: 5/10 at end of sessions - progressing  3. Pt will increase PROM left shoulder flex to at least 120 degs, ABD to 75 degs to improve ability to progress through protocol with increased ease. Eval: flex 50 degs, ABD 20 degs  Current: PROM left shoulder flex 90 degs, ABD 30 degs, IR to stomach in neutral, ER to neutral in neutral (pain with all ranges) 6/14/2021  4. Pt will report being able to don/doff a shirt independently to improve independence with dressing activities.   Eval: reports needing assistance from family with donning/doffing a shirt.   Current: not met, continues to have pain with dressing 6/14/2021    PLAN  [x]  Upgrade activities as tolerated     [x]  Continue plan of care  []  Update interventions per flow sheet       []  Discharge due to:_  []  Other:_      Pam Wells PTA 6/23/2021  10:27 AM    Future Appointments   Date Time Provider Deisy Stacia   6/23/2021  1:30 PM Cole Naranjo [facility]   7/16/2021  2:15 PM CHERELLE Grijalva [facility] BS AMB   8/16/2021 10:00 AM Felisha Dyer MD [facility] BS AMB

## 2021-06-28 ENCOUNTER — HOSPITAL ENCOUNTER (OUTPATIENT)
Dept: PHYSICAL THERAPY | Age: 56
Discharge: HOME | End: 2021-06-28
Attending: ORTHOPAEDIC SURGERY
Payer: COMMERCIAL

## 2021-06-28 PROCEDURE — 97014 ELECTRIC STIMULATION THERAPY: CPT

## 2021-06-28 PROCEDURE — 97530 THERAPEUTIC ACTIVITIES: CPT

## 2021-06-28 PROCEDURE — 97140 MANUAL THERAPY 1/> REGIONS: CPT

## 2021-06-28 PROCEDURE — 97110 THERAPEUTIC EXERCISES: CPT

## 2021-06-28 NOTE — PROGRESS NOTES
PT DAILY TREATMENT NOTE     Patient Name: Jarvis Mata  Date:2021  : 1965  [x]  Patient  Verified  Payor: Sury Hsieh / Plan: Dina Aguirre PPO / Product Type: PPO /    In time:130  Out time:225  Total Treatment Time (min): 5  Visit #: 3 of 10    Treatment Area: Pain in left shoulder [M25.512]    SUBJECTIVE  Pain Level (0-10 scale): 4  Any medication changes, allergies to medications, adverse drug reactions, diagnosis change, or new procedure performed?: [x] No    [] Yes (see summary sheet for update)  Subjective functional status/changes:   [] No changes reported  Patient reports her shoulder is a little better today but she's been having dizzy spells all day.     OBJECTIVE    Modality rationale: decrease inflammation and decrease pain to improve the patients ability to reduce post session soreness   Min Type Additional Details   15 [x] Estim:  [x]Unatt       [x]IFC  []Premod                        []Other:  [x]w/ice   []w/heat  Position: seated   Location: left shoulder    [] Estim: []Att    []TENS instruct  []NMES                    []Other:  []w/US   []w/ice   []w/heat  Position:  Location:    []  Traction: [] Cervical       []Lumbar                       [] Prone          []Supine                       []Intermittent   []Continuous Lbs:  [] before manual  [] after manual    []  Ultrasound: []Continuous   [] Pulsed                           []1MHz   []3MHz W/cm2:  Location:    []  Iontophoresis with dexamethasone         Location: [] Take home patch   [] In clinic    []  Ice     []  heat  []  Ice massage  []  Laser   []  Anodyne Position:  Location:    []  Laser with stim  []  Other:  Position:  Location:    []  Vasopneumatic Device    []  Right     []  Left  Pre-treatment girth:  Post-treatment girth:  Measured at (location):  Pressure:       [] lo [] med [] hi   Temperature: [] lo [] med [] hi   [x] Skin assessment post-treatment:  [x]intact []redness- no adverse reaction    []redness  adverse reaction:     20 min Therapeutic Exercise:  [] See flow sheet :   Rationale: increase ROM and increase strength to improve the patients ability to increase activity tolerance    12 min Therapeutic Activity:  [x]  See flow sheet : FOTO, goals assessment   Rationale: increase ROM, increase strength and improve coordination  to improve the patients ability to progress with therapy     8 min Manual Therapy:  Gentle PROM within patient's tolerance; STM to left UT   The manual therapy interventions were performed at a separate and distinct time from the therapeutic activities interventions. Rationale: decrease pain, increase ROM and increase tissue extensibility to improve functional mobility of the UE          With   [] TE   [] TA   [] neuro   [] other: Patient Education: [x] Review HEP    [] Progressed/Changed HEP based on:   [] positioning   [] body mechanics   [] transfers   [] heat/ice application    [] other:      Other Objective/Functional Measures: assessed goals     Pain Level (0-10 scale) post treatment: 5    ASSESSMENT/Changes in Function: Patient is making slow progress toward goals but continues to be very guarded with PROM and hypersensitive to light touch/STM of the shoulder girdle. She reports improved ability to dress but still needs assistance donning/doffing her bra. She would benefit from continued therapy to progress ROM and strengthening per protocol. Patient will continue to benefit from skilled PT services to modify and progress therapeutic interventions, address functional mobility deficits, address ROM deficits, address strength deficits, analyze and address soft tissue restrictions, analyze and cue movement patterns, analyze and modify body mechanics/ergonomics, assess and modify postural abnormalities, address imbalance/dizziness and instruct in home and community integration to attain remaining goals.      []  See Plan of Care  []  See progress note/recertification  []  See Discharge Summary         Progress towards goals / Updated goals:   Short Term Goals: To be accomplished in 2 treatments:  1. Pt will report compliance and independence to HEP to help the pt manage their pain and symptoms.             Eval: established  MET - did them all over the weekend    [facility] [address] be accomplished in 10 treatments:  1. Pt will increase FOTO score to 64 points to improve ability to perform ADLs. Eval: 41 points  Progressing- 45  2. Pt will report an improvement in at best pain to at least 3/10 to improve ability to tolerate bathing. Eval: 6/10 at best  Progressin/10 at best  3. Pt will increase PROM left shoulder flex to at least 120 degs, ABD to 75 degs to improve ability to progress through protocol with increased ease. Eval: flex 50 degs, ABD 20 degs  Progressing: PROM left shoulder flex 90 degs, ABD 30 degs, IR to stomach in neutral, ER to neutral in neutral (pain with all ranges except IR) 2021  4. Pt will report being able to don/doff a shirt independently to improve independence with dressing activities.   Eval: reports needing assistance from family with donning/doffing a shirt.   Progressing: continues to have pain with dressing but reports increased ability to don/doff shirt 2021    Functional Gains: pain improving, easier to don/doff shirt  Functional Deficits: don/doff bra  % improvement: no % given   Pain   Average: 5-6/10       Best: 4/10     Worst: 8/10  Patient Goal: \"To be able to put my bra on without help\"       PLAN  []  Upgrade activities as tolerated     [x]  Continue plan of care  []  Update interventions per flow sheet       []  Discharge due to:_  []  Other:_      Singh Barry PTA 2021  1:06 PM    Future Appointments   Date Time Provider Deisy Palomo   2021  1:30 PM Trinidad Aiken [facility]   2021 12:00 PM Nyla Rosales PT [facility]   2021 12:00 PM Sarah Orona PTA [facility]   2021  2:15 PM Narda Sara Lee, PT [facility]   7/12/2021  1:30 PM Deyanira Del Rio, PT [facility]   7/14/2021  1:30 PM Felisha Recio, PTA [facility]   7/16/2021 11:15 AM Felisha Recio, PTA [facility]   7/16/2021  2:15 PM CHERELLE Mcdaniel [facility] BS AMB   7/19/2021  1:30 PM Deyanira Del Rio, PT [facility]   7/21/2021  1:30 PM Felisha Recio, PTA [facility]   7/23/2021  1:30 PM Felisha Recio, PTA [facility]   8/16/2021 10:00 AM Emily Marshall MD [facility] BS AMB

## 2021-06-29 NOTE — PROGRESS NOTES
[facility] [address] [facility]  [address] [facility] [facility], [facility] [address]  [phone] fax    Progress Note  Patient name: Te Schulte Start of Care: 2021   Referral source: LoganNick Kemp : 1965               Medical Diagnosis: Pain in left shoulder [M25.512]  Payor: Larry Sender / Plan: Mihai Woo PPO / Product Type: PPO /  Onset Date: DOS 2021               Treatment Diagnosis: left shoulder pain   Prior Hospitalization: see medical history Provider#: [id number]   Medications: Verified on Patient summary List    Comorbidities: asthma, lymphedema. Prior Level of Function: Independent with ADls, functional, and work activities with pain in the left shoulder before surgery. Visits from Start of Care: 10    Missed Visits: 1    Established Goals:         Excellent           Good         Limited           None  [x] Increased ROM   []  []  [x]  []  [] Increased Strength  []  []  []  []  [x] Increased Mobility  []  []  [x]  []   [x] Decreased Pain   []  []  [x]  []  [] Decreased Swelling  []  []  []  []    Key Functional Changes:   Functional Gains: pain improving, easier to don/doff shirt  Functional Deficits: don/doff bra  % improvement: no % given   Pain   Average: 5-6/10                  Best: 4/10                Worst: 8/10  Patient Goal: \"To be able to put my bra on without help\"     Current goals:  Short Term Goals: To be accomplished in 2 treatments:  1. Pt will report compliance and independence to HEP to help the pt manage their pain and symptoms.             Eval: established  MET - did them all over the weekend    [facility] be accomplished in 10 treatments:  1. Pt will increase FOTO score to 64 points to improve ability to perform ADLs. Eval: 41 points  Progressing- 45  2. Pt will report an improvement in at best pain to at least 3/10 to improve ability to tolerate bathing. Eval: 6/10 at best  Progressin/10 at best  3. Pt will increase PROM left shoulder flex to at least 120 degs, ABD to 75 degs to improve ability to progress through protocol with increased ease. Eval: flex 50 degs, ABD 20 degs  Progressing: PROM left shoulder flex 90 degs, ABD 30 degs, IR to stomach in neutral, ER to neutral in neutral (pain with all ranges except IR) 6/28/2021  4. Pt will report being able to don/doff a shirt independently to improve independence with dressing activities. Eval: reports needing assistance from family with donning/doffing a shirt.   Progressing: continues to have pain with dressing but reports increased ability to don/doff shirt 6/28/2021    Updated Goals: to be achieved in 5 weeks:  Continue with unmet goals above. ASSESSMENT/RECOMMENDATIONS:  Pt is making slow progress toward goals above. She is very guarded with PROM and also demonstrates limited PROM. She continues to report elevated pain levels in the left shoulder region. She reports improved ability to dress herself but still needs assistance with donning/doffing her bra. We will plan on continuing therapy per protocol to improve the pt's pain and ROM.    []Continue therapy per initial plan/protocol at a frequency of  2 x per week for 5 weeks  []Continue therapy with the following recommended changes:_____________________      _____________________________________________________________________  []Discontinue therapy progressing towards or have reached established goals  []Discontinue therapy due to lack of appreciable progress towards goals  []Discontinue therapy due to lack of attendance or compliance  []Await Physician's recommendations/decisions regarding therapy  []Other:________________________________________________________________    Thank you for this referral.   Vandana Major, PT 6/29/2021 7:22 PM  NOTE TO PHYSICIAN:  PLEASE COMPLETE THE ORDERS BELOW AND   FAX TO [facility]: [phone]  If you are unable to process this request in 29 hours please contact our office: [phone]    []  I have read the above report and request that my patient continue as recommended. []  I have read the above report and request that my patient continue therapy with the following changes/special instructions:________________________________________  []I have read the above report and request that my patient be discharged from therapy.     Physician's Signature:____________Date:_________TIME:________     CHERELLE Kimble  ** Signature, Date and Time must be completed for valid certification **

## 2021-07-02 ENCOUNTER — HOSPITAL ENCOUNTER (OUTPATIENT)
Dept: PHYSICAL THERAPY | Age: 56
Discharge: HOME | End: 2021-07-02
Attending: ORTHOPAEDIC SURGERY
Payer: COMMERCIAL

## 2021-07-02 PROCEDURE — 97014 ELECTRIC STIMULATION THERAPY: CPT

## 2021-07-02 PROCEDURE — 97110 THERAPEUTIC EXERCISES: CPT

## 2021-07-02 PROCEDURE — 97140 MANUAL THERAPY 1/> REGIONS: CPT

## 2021-07-02 NOTE — PROGRESS NOTES
PT DAILY TREATMENT NOTE     Patient Name: Prema Aguilar  Date:2021  : 1965  [x]  Patient  Verified  Payor: Diana Sheets / Plan: Sissy Herrera PPO / Product Type: PPO /    In time: 1:41   Out time: 2:34  Total Treatment Time (min): 48  Visit #: 2 of 10    Treatment Area: Pain in left shoulder [M25.512]    SUBJECTIVE  Pain Level (0-10 scale): 8  Any medication changes, allergies to medications, adverse drug reactions, diagnosis change, or new procedure performed?: [x] No    [] Yes (see summary sheet for update)  Subjective functional status/changes:   [] No changes reported  Pt states she is really stressed out which is making her pain worse.      OBJECTIVE    Modality rationale: decrease inflammation and decrease pain to improve the patients ability to reduce post session soreness   Min Type Additional Details   15 [x] Estim:  [x]Unatt       [x]IFC  []Premod                        []Other:  [x]w/ice   []w/heat  Position: reclined  Location: left shoulder    [] Estim: []Att    []TENS instruct  []NMES                    []Other:  []w/US   []w/ice   []w/heat  Position:  Location:    []  Traction: [] Cervical       []Lumbar                       [] Prone          []Supine                       []Intermittent   []Continuous Lbs:  [] before manual  [] after manual    []  Ultrasound: []Continuous   [] Pulsed                           []1MHz   []3MHz W/cm2:  Location:    []  Iontophoresis with dexamethasone         Location: [] Take home patch   [] In clinic    []  Ice     []  heat  []  Ice massage  []  Laser   []  Anodyne Position:  Location:    []  Laser with stim  []  Other:  Position:  Location:    []  Vasopneumatic Device    []  Right     []  Left  Pre-treatment girth:  Post-treatment girth:  Measured at (location):  Pressure:       [] lo [] med [] hi   Temperature: [] lo [] med [] hi   [x] Skin assessment post-treatment:  [x]intact []redness- no adverse reaction    []redness  adverse reaction: 23 min Therapeutic Exercise:  [x] See flow sheet :   Rationale: increase ROM and increase strength to improve the patients ability to increase activity tolerance     15 min Manual Therapy: in reclined position: gentle left GHJ distraction, DTM left pec minor, PROM with gentle oscillations and distraction into left shoulder flex/ABD/ER/IR after performing above   The manual therapy interventions were performed at a separate and distinct time from the therapeutic activities interventions. Rationale: decrease pain, increase ROM and increase tissue extensibility to improve ease of mobility. With   [x] TE   [] TA   [] neuro   [] other: Patient Education: [x] Review HEP    [] Progressed/Changed HEP based on:   [] positioning   [] body mechanics   [] transfers   [] heat/ice application    [] other:      Other Objective/Functional Measures: PROM ~80-90 degs left shoulder flex during manual interventions. Tenderness to the left pec minor with manual interventions. Pain Level (0-10 scale) post treatment: 6    ASSESSMENT/Changes in Function: Reported improvement in pain post session today. Pt had elevated pain with most activities today. She states she is stressed out today because of issues with her home and thinks this is why she is in more pain. Discussed with pt today trying chest press with cane at home for HEP to improve ROM (per protocol). Therapist messaged PA today to confirm that the pt is in the PROM phase this far out of surgery (script from 6/16/2021 from PA states she is still PROM). Continue POC as tolerated per protocol to improve the pt's pain and ROM.      Patient will continue to benefit from skilled PT services to modify and progress therapeutic interventions, address functional mobility deficits, address ROM deficits, address strength deficits, analyze and address soft tissue restrictions, analyze and cue movement patterns, analyze and modify body mechanics/ergonomics, assess and modify postural abnormalities, address imbalance/dizziness and instruct in home and community integration to attain remaining goals. []  See Plan of Care  []  See progress note/recertification  []  See Discharge Summary         Progress towards goals / Updated goals:  Updated Goals: to be achieved in 5 weeks:  1. Pt will increase FOTO score to 64 points to improve ability to perform ADLs. PN: 45  2. Pt will report an improvement in at best pain to at least 3/10 to improve ability to tolerate bathing. PN: 4/10 at best  Current: at met 8/10 today 7/2/2021  3. Pt will increase PROM left shoulder flex to at least 120 degs, ABD to 75 degs to improve ability to progress through protocol with increased ease. PN: PROM left shoulder flex 90 degs, ABD 30 degs, IR to stomach in neutral, ER to neutral in neutral (pain with all ranges except IR)  4. Pt will report being able to don/doff a shirt independently to improve independence with dressing activities.   PN: continues to have pain with dressing but reports increased ability to don/doff shirt    PLAN  [x]  Upgrade activities as tolerated     [x]  Continue plan of care  [x]  Update interventions per flow sheet       []  Discharge due to:_  []  Other:_      Glendy Mansfield, PT 7/2/2021  1:40 PM    Future Appointments   Date Time Provider Deisy Palomo   7/7/2021 12:00 PM Velta Colonel, PTA [facility] [address]   7/9/2021  2:15 PM Felipa Vincent, PT [facility] [address]   7/12/2021  1:30 PM Aric Thomson, PT [facility] [address]   7/14/2021  1:30 PM Velta Colonel, PTA [facility] [address]   7/16/2021 11:15 AM Velta Colonel, PTA [facility] [address]   7/16/2021  2:15 PM CHERELLE Matthew [facility] BS AMB   7/19/2021  1:30 PM Meryl Salazar, PTA [facility] [address]   7/21/2021  1:30 PM Velta Colonel, PTA [facility] [address]   7/23/2021  1:30 PM Velta Colonel, PTA [facility] [address]   8/16/2021 10:00 AM Cm Thurston MD [facility] BS AMB

## 2021-07-06 ENCOUNTER — DOCUMENTATION ONLY (OUTPATIENT)
Dept: ORTHOPEDIC SURGERY | Age: 56
End: 2021-07-06

## 2021-07-06 ENCOUNTER — TELEPHONE (OUTPATIENT)
Dept: ORTHOPEDIC SURGERY | Age: 56
End: 2021-07-06

## 2021-07-06 NOTE — TELEPHONE ENCOUNTER
Patient called to see if we received the form from Bob. I relayed we have according to note in chart. She states her disability will end tomorrow 07/07 and is asking if there is anyway this can be done as soon as possible. Please advise.      Patient [phone]

## 2021-07-06 NOTE — PROGRESS NOTES
[facility], faxed and confirmation received. Copy made for scanning. Original placed in forms file at . Patient notified.

## 2021-07-07 ENCOUNTER — HOSPITAL ENCOUNTER (OUTPATIENT)
Dept: PHYSICAL THERAPY | Age: 56
Discharge: HOME | End: 2021-07-07
Attending: ORTHOPAEDIC SURGERY
Payer: COMMERCIAL

## 2021-07-07 PROCEDURE — 97014 ELECTRIC STIMULATION THERAPY: CPT

## 2021-07-07 PROCEDURE — 97140 MANUAL THERAPY 1/> REGIONS: CPT

## 2021-07-07 PROCEDURE — 97110 THERAPEUTIC EXERCISES: CPT

## 2021-07-07 NOTE — PROGRESS NOTES
PT DAILY TREATMENT NOTE     Patient Name: Tuan Suazo  Date:2021  : 1965  [x]  Patient  Verified  Payor: Lajoyce Boast / Plan: Marleny Butler PPO / Product Type: PPO /    In time:1:30  Out time:2:26  Total Treatment Time (min): 56  Visit #: 3 of 10    Treatment Area: Pain in left shoulder [M25.512]    SUBJECTIVE  Pain Level (0-10 scale):    Any medication changes, allergies to medications, adverse drug reactions, diagnosis change, or new procedure performed?: [x] No    [] Yes (see summary sheet for update)  Subjective functional status/changes:   [] No changes reported  Pt states pain is not as bad today    OBJECTIVE    Modality rationale: decrease pain to improve the patients ability to perform daily tasks   Min Type Additional Details   15 [x] Estim:  [x]Unatt       [x]IFC  []Premod                        []Other:  [x]w/ice   []w/heat  Position: reclined  Location: left shoulder    [] Estim: []Att    []TENS instruct  []NMES                    []Other:  []w/US   []w/ice   []w/heat  Position:  Location:    []  Traction: [] Cervical       []Lumbar                       [] Prone          []Supine                       []Intermittent   []Continuous Lbs:  [] before manual  [] after manual    []  Ultrasound: []Continuous   [] Pulsed                           []1MHz   []3MHz W/cm2:  Location:    []  Iontophoresis with dexamethasone         Location: [] Take home patch   [] In clinic    []  Ice     []  heat  []  Ice massage  []  Laser   []  Anodyne Position:  Location:    []  Laser with stim  []  Other:  Position:  Location:    []  Vasopneumatic Device    []  Right     []  Left  Pre-treatment girth:  Post-treatment girth:  Measured at (location):  Pressure:       [] lo [] med [] hi   Temperature: [] lo [] med [] hi   [] Skin assessment post-treatment:  []intact []redness- no adverse reaction    []redness  adverse reaction:       26 min Therapeutic Exercise:  [x] See flow sheet :   Rationale: increase ROM and increase strength to improve the patients ability to perform ADL    15 min Manual Therapy:  STM to left UT, lev scap, infra, and ant delt. Gentle GH distraction and oscillations with PROM   The manual therapy interventions were performed at a separate and distinct time from the therapeutic activities interventions. Rationale: decrease pain, increase ROM and increase tissue extensibility to improve functional mobility             With   [] TE   [] TA   [] neuro   [] other: Patient Education: [x] Review HEP    [] Progressed/Changed HEP based on:   [] positioning   [] body mechanics   [] transfers   [] heat/ice application    [] other:      Other Objective/Functional Measures:   Exercise progression per staff message      Pain Level (0-10 scale) post treatment: 5    ASSESSMENT/Changes in Function: Demo'd improved PROM flex today although not formally measured but cont'd pain with ecc lowering. Able to perform wand flex without difficulty but pain with ecc lowering. TTP @ infra and ant delt. Cont progressing AAROM as tolerated per protocol. Patient will continue to benefit from skilled PT services to modify and progress therapeutic interventions, address functional mobility deficits, address ROM deficits, address strength deficits, analyze and address soft tissue restrictions, analyze and cue movement patterns, analyze and modify body mechanics/ergonomics and assess and modify postural abnormalities to attain remaining goals. []  See Plan of Care  []  See progress note/recertification  []  See Discharge Summary         Progress towards goals / Updated goals:  Updated Goals: to be achieved in 5 weeks:  1. Pt will increase FOTO score to 64 points to improve ability to perform ADLs. PN: 45  2. Pt will report an improvement in at best pain to at least 3/10 to improve ability to tolerate bathing. PN: 4/10 at best  Current: at met 8/10 today 7/2/2021  3.  Pt will increase PROM left shoulder flex to at least 120 degs, ABD to 75 degs to improve ability to progress through protocol with increased ease. PN: PROM left shoulder flex 90 degs, ABD 30 degs, IR to stomach in neutral, ER to neutral in neutral (pain with all ranges except IR)  4. Pt will report being able to don/doff a shirt independently to improve independence with dressing activities.   PN: continues to have pain with dressing but reports increased ability to don/doff shirt       PLAN  []  Upgrade activities as tolerated     [x]  Continue plan of care  []  Update interventions per flow sheet       []  Discharge due to:_  []  Other:_      Mannie Clarke, TINY 7/7/2021  1:18 PM    Future Appointments   Date Time Provider Deisy Palomo   7/7/2021  1:30 PM Marimar Vidales [facility]   7/9/2021  2:15 PM Germain Musa, PT [facility]   7/12/2021  1:30 PM Lynne Vasquez, PT [facility]   7/14/2021  1:30 PM Ana Perales, PTA [facility]   7/16/2021 11:15 AM Ana Perales, PTA [facility]   7/16/2021  2:15 PM CHERELLE Quezada [facility] BS AMB   7/19/2021  1:30 PM Cranford Claude, PTA [facility]   7/21/2021  1:30 PM Ana Perales, PTA [facility]   7/23/2021  1:30 PM Ana Perales, PTA [facility]   8/16/2021 10:00 AM Faviola Brothers MD [facility] BS AMB

## 2021-07-09 ENCOUNTER — HOSPITAL ENCOUNTER (OUTPATIENT)
Dept: PHYSICAL THERAPY | Age: 56
Discharge: HOME | End: 2021-07-09
Attending: ORTHOPAEDIC SURGERY
Payer: COMMERCIAL

## 2021-07-09 PROCEDURE — 97014 ELECTRIC STIMULATION THERAPY: CPT

## 2021-07-09 PROCEDURE — 97110 THERAPEUTIC EXERCISES: CPT

## 2021-07-09 PROCEDURE — 97140 MANUAL THERAPY 1/> REGIONS: CPT

## 2021-07-09 NOTE — PROGRESS NOTES
PT DAILY TREATMENT NOTE     Patient Name: Ko Saxena  Date:2021  : 1965  [x]  Patient  Verified  Payor: Constantino Grad / Plan: Louie Gaucher PPO / Product Type: PPO /    In time: 2:18  Out time: 3:07  Total Treatment Time (min): 49  Visit #: 4 of 10    Treatment Area: Pain in left shoulder [M25.512]    SUBJECTIVE  Pain Level (0-10 scale): 4  Any medication changes, allergies to medications, adverse drug reactions, diagnosis change, or new procedure performed?: [x] No    [] Yes (see summary sheet for update)  Subjective functional status/changes:   [] No changes reported  Pt reports her shoulder is not hurting her too bad today.      OBJECTIVE    Modality rationale: decrease pain to improve the patients ability to perform daily tasks   Min Type Additional Details   15 [x] Estim:  [x]Unatt       [x]IFC  []Premod                        []Other:  [x]w/ice   []w/heat  Position: reclined  Location: left shoulder    [] Estim: []Att    []TENS instruct  []NMES                    []Other:  []w/US   []w/ice   []w/heat  Position:  Location:    []  Traction: [] Cervical       []Lumbar                       [] Prone          []Supine                       []Intermittent   []Continuous Lbs:  [] before manual  [] after manual    []  Ultrasound: []Continuous   [] Pulsed                           []1MHz   []3MHz W/cm2:  Location:    []  Iontophoresis with dexamethasone         Location: [] Take home patch   [] In clinic    []  Ice     []  heat  []  Ice massage  []  Laser   []  Anodyne Position:  Location:    []  Laser with stim  []  Other:  Position:  Location:    []  Vasopneumatic Device    []  Right     []  Left  Pre-treatment girth:  Post-treatment girth:  Measured at (location):  Pressure:       [] lo [] med [] hi   Temperature: [] lo [] med [] hi   [x] Skin assessment post-treatment:  [x]intact []redness- no adverse reaction    []redness  adverse reaction:     19 min Therapeutic Exercise:  [x] See flow sheet :   Rationale: increase ROM and increase strength to improve the patients ability to perform ADL    15 min Manual Therapy: in reclined position: gentle left GHJ distraction, grade 2-3 inferior and posterior left GHJ mobs, PROM with gentle oscillations and distraction into left shoulder flex/ABD/ER/IR after performing above   The manual therapy interventions were performed at a separate and distinct time from the therapeutic activities interventions. Rationale: decrease pain, increase ROM and increase tissue extensibility to improve functional mobility           With   [x] TE   [] TA   [] neuro   [] other: Patient Education: [x] Review HEP    [] Progressed/Changed HEP based on:   [] positioning   [] body mechanics   [] transfers   [] heat/ice application    [] other:      Other Objective/Functional Measures: Added pulleys to improve ROM in the left shoulder. Pain Level (0-10 scale) post treatment: 5    ASSESSMENT/Changes in Function: Reported mild increase in pain post session today. [facility] but she has increased pain around  degs flex PROM. She is still limited with ROM because of increased pain on the superior left scapula. Pt challenged with flexion pulleys secondary to pain as well. Continue POC as tolerated to improve pain and mobility. Patient will continue to benefit from skilled PT services to modify and progress therapeutic interventions, address functional mobility deficits, address ROM deficits, address strength deficits, analyze and address soft tissue restrictions, analyze and cue movement patterns, analyze and modify body mechanics/ergonomics and assess and modify postural abnormalities to attain remaining goals. []  See Plan of Care  []  See progress note/recertification  []  See Discharge Summary         Progress towards goals / Updated goals:  Updated Goals: to be achieved in 5 weeks:  1.  Pt will increase FOTO score to 64 points to improve ability to perform ADLs. PN: 45  Current: assess at MD note 7/9/2021  2. Pt will report an improvement in at best pain to at least 3/10 to improve ability to tolerate bathing. PN: 4/10 at best  Current: progressing, 5/10 today 7/9/2021  3. Pt will increase PROM left shoulder flex to at least 120 degs, ABD to 75 degs to improve ability to progress through protocol with increased ease. PN: PROM left shoulder flex 90 degs, ABD 30 degs, IR to stomach in neutral, ER to neutral in neutral (pain with all ranges except IR)  Current: progressing, Therapist able to attain [facility] PROM left shoulder flex today but she has increased pain around  degs flex PROM 7/9/2021. 4. Pt will report being able to don/doff a shirt independently to improve independence with dressing activities.   PN: continues to have pain with dressing but reports increased ability to don/doff shirt     PLAN  [x]  Upgrade activities as tolerated     [x]  Continue plan of care  [x]  Update interventions per flow sheet       []  Discharge due to:_  []  Other:_      Pushpa Marie, PT 7/9/2021  2:37 PM    Future Appointments   Date Time Provider Deisy Stacia   7/12/2021  1:30 PM Darryl Martinez PT [facility]   7/14/2021  1:30 PM Britt Bishop, PTA [facility]   7/16/2021 11:15 AM Britt Bishop, PTA [facility]   7/16/2021  2:15 PM CHERELLE Tirado [facility] BS AMB   7/19/2021  1:30 PM Christelle Peralta, PTA [facility]   7/21/2021  1:30 PM Britt Bishop, PTA [facility]   7/23/2021  1:30 PM Britt Bishop, PTA [facility]   8/16/2021 10:00 AM Hu Jerome MD [facility] BS AMB

## 2021-07-12 ENCOUNTER — HOSPITAL ENCOUNTER (OUTPATIENT)
Dept: PHYSICAL THERAPY | Age: 56
Discharge: HOME | End: 2021-07-12
Attending: ORTHOPAEDIC SURGERY
Payer: COMMERCIAL

## 2021-07-12 PROCEDURE — 97014 ELECTRIC STIMULATION THERAPY: CPT | Performed by: PHYSICAL THERAPIST

## 2021-07-12 PROCEDURE — 97112 NEUROMUSCULAR REEDUCATION: CPT | Performed by: PHYSICAL THERAPIST

## 2021-07-12 PROCEDURE — 97530 THERAPEUTIC ACTIVITIES: CPT | Performed by: PHYSICAL THERAPIST

## 2021-07-12 PROCEDURE — 97110 THERAPEUTIC EXERCISES: CPT | Performed by: PHYSICAL THERAPIST

## 2021-07-12 NOTE — PROGRESS NOTES
PT DAILY TREATMENT NOTE     Patient Name: Mahi Leon  Date:2021  : 1965  [x]  Patient  Verified  Payor: Jeanne Win / Plan: Lina Colón PPO / Product Type: PPO /    In time:133  Out time:2:25P  Total Treatment Time (min): 52min  Visit #: 5 of 10    Medicare/BCBS Only   Total Timed Codes (min):  40 1:1 Treatment Time:  40       Treatment Area: Pain in left shoulder [M25.512]    SUBJECTIVE  Pain Level (0-10 scale): 5/10  Any medication changes, allergies to medications, adverse drug reactions, diagnosis change, or new procedure performed?: [x] No    [] Yes (see summary sheet for update)  Subjective functional status/changes:   [] No changes reported  Patient states she is doing better taking her arm over head but is still having increased pain when bringing her arm down. OBJECTIVE  Modality rationale: decrease pain to improve the patients ability to perform daily tasks   Min Type Additional Details    15 [x]? Estim:  [x]? Unatt       [x]? IFC  []? Premod                        []?Other:  [x]?w/ice   []?w/heat  Position: reclined  Location: left shoulder      []? Estim: []? Att    []? TENS instruct  []? NMES                    []?Other:  []?w/US   []?w/ice   []?w/heat  Position:  Location:      []? Traction: []? Cervical       []? Lumbar                       []? Prone          []? Supine                       []?Intermittent   []? Continuous Lbs:  []? before manual  []? after manual      []? Ultrasound: []? Continuous   []? Pulsed                           []? 1MHz   []? 3MHz W/cm2:  Location:      []? Iontophoresis with dexamethasone         Location: []? Take home patch   []? In clinic      []? Ice     []?  heat  []? Ice massage  []? Laser   []? Anodyne Position:  Location:      []? Laser with stim  []? Other:  Position:  Location:      []? Vasopneumatic Device    []? Right     []? Left  Pre-treatment girth:  Post-treatment girth:  Measured at (location):  Pressure:       []? lo []? med []? hi   Temperature: []? lo []? med []? hi    [x]? Skin assessment post-treatment:  [x]? intact []? redness- no adverse reaction    []? redness  adverse reaction:        15 min Therapeutic Exercise:  [x]? ?? See flow sheet :   Rationale: increase ROM and increase strength to improve the patients ability to increase A/ROM and decrease pain with movement.     14 min Therapeutic Activity:  [x]? ??  See flow sheet :   Rationale: increase strength and improve coordination  to improve the patients ability to Tolerate basic ADLs and household-related tasks without pain.      16 min Neuromuscular Re-education:  [x]? ??  See flow sheet :   Rationale: improve coordination, improve balance and increase proprioception  to improve the patients ability to the patients ability to perform functional tasks such as overhead reach when appropriat     With   [x]? ?? TE   [x]? ?? TA   [x]? ?? neuro   []? ?? other: Patient Education: [x]??? Review HEP    [x]? ?? Progressed/Changed HEP based on:   [x]? ?? positioning   []? ?? body mechanics   []? ?? transfers   []? ?? heat/ice application    []? ?? other:       Other Objective/Functional Measures: AA/ROM of Left shoulder up to 150 degrees     Pain Level (0-10 scale) post treatment: 4/10    ASSESSMENT/Changes in Function: Patient is progressing slowly towards goals of increased activity tolerance and decreased pain. Patient education today on scar massage and the importance of doing this for decreased sensitivity to surgical site and surrounding tissue.      Patient will continue to benefit from skilled PT services to modify and progress therapeutic interventions, address functional mobility deficits, address ROM deficits, address strength deficits, analyze and address soft tissue restrictions, analyze and cue movement patterns, analyze and modify body mechanics/ergonomics, assess and modify postural abnormalities, address imbalance/dizziness and instruct in home and community integration to attain remaining goals. [x]  See Plan of Care  []  See progress note/recertification  []  See Discharge Summary         Progress towards goals / Updated goals:  Updated Goals: to be achieved in 5 weeks:  1. Pt will increase FOTO score to 64 points to improve ability to perform ADLs. PN: 45  Current: assess at MD note   2. Pt will report an improvement in at best pain to at least 3/10 to improve ability to tolerate bathing. PN: 4/10 at best  Current: progressing, 4/10 today at end of session   3. Pt will increase PROM left shoulder flex to at least 120 degs, ABD to 75 degs to improve ability to progress through protocol with increased ease. PN: PROM left shoulder flex 90 degs, ABD 30 degs, IR to stomach in neutral, ER to neutral in neutral (pain with all ranges except IR)  Current: progressing, [facility] today but she has increased pain around  degs flex PROM 7/9/2021. 4. Pt will report being able to don/doff a shirt independently to improve independence with dressing activities.   PN: continues to have pain with dressing but reports increased ability to don/doff shirt    PLAN  [x]  Upgrade activities as tolerated     []  Continue plan of care  []  Update interventions per flow sheet       []  Discharge due to:_  []  Other:_      Gavi Yoo, PT 7/12/2021  1:53 PM    Future Appointments   Date Time Provider Deisy Palomo   7/14/2021  1:30 PM Sophia Rod PTA [facility]   7/16/2021 12:00 PM [facility] PT [facility] 1 [facility]   7/16/2021  2:15 PM CHERELLE Petty [facility] BS AMB   7/19/2021  1:30 PM oJshua Hamilton PTA [facility]   7/21/2021  1:30 PM Sophia Rod PTA [facility]   7/23/2021  1:30 PM Sophia Rod PTA [facility]   8/16/2021 10:00 AM Yolanda Mcintyre MD [facility] BS AMB

## 2021-07-14 ENCOUNTER — HOSPITAL ENCOUNTER (OUTPATIENT)
Dept: PHYSICAL THERAPY | Age: 56
Discharge: HOME | End: 2021-07-14
Attending: ORTHOPAEDIC SURGERY
Payer: COMMERCIAL

## 2021-07-14 PROCEDURE — 97530 THERAPEUTIC ACTIVITIES: CPT

## 2021-07-14 PROCEDURE — 97014 ELECTRIC STIMULATION THERAPY: CPT

## 2021-07-14 PROCEDURE — 97110 THERAPEUTIC EXERCISES: CPT

## 2021-07-14 NOTE — PROGRESS NOTES
PT DAILY TREATMENT NOTE     Patient Name: Ko Saxena  Date:2021  : 1965  [x]  Patient  Verified  Payor: Constantino Austin / Plan: Louie Gaucher PPO / Product Type: PPO /    In time: 1:30  Out time: 2:25  Total Treatment Time (min): 55  Visit #: 6 of 10    Treatment Area: Pain in left shoulder [M25.512]    SUBJECTIVE  Pain Level (0-10 scale): 4.5  Any medication changes, allergies to medications, adverse drug reactions, diagnosis change, or new procedure performed?: [x] No    [] Yes (see summary sheet for update)  Subjective functional status/changes:   [] No changes reported  Patient states \"it's not too bad today. .. the pain isn't getting any worse. \"    OBJECTIVE    Modality rationale: decrease inflammation, decrease pain and increase tissue extensibility to improve the patients ability to  tolerate exercises and return to daily activity with ease.    Min Type Additional Details   15 [x] Estim:  [x]Unatt       [x]IFC  []Premod                        []Other:  [x]w/ice   []w/heat  Position: reclined long sit  Location: left shoulder    [] Estim: []Att    []TENS instruct  []NMES                    []Other:  []w/US   []w/ice   []w/heat  Position:  Location:    []  Traction: [] Cervical       []Lumbar                       [] Prone          []Supine                       []Intermittent   []Continuous Lbs:  [] before manual  [] after manual    []  Ultrasound: []Continuous   [] Pulsed                           []1MHz   []3MHz W/cm2:  Location:    []  Iontophoresis with dexamethasone         Location: [] Take home patch   [] In clinic    []  Ice     []  heat  []  Ice massage  []  Laser   []  Anodyne Position:  Location:    []  Laser with stim  []  Other:  Position:  Location:    []  Vasopneumatic Device    []  Right     []  Left  Pre-treatment girth:  Post-treatment girth:  Measured at (location):  Pressure:       [] lo [] med [] hi   Temperature: [] lo [] med [] hi   [] Skin assessment post-treatment:  []intact []redness- no adverse reaction    []redness  adverse reaction:     25 min Therapeutic Exercise:  [x] See flow sheet :   Rationale: increase ROM and increase strength to improve the patients ability to perform ADLs with ease    15 min Therapeutic Activity:  [x]  See flow sheet :   Rationale: increase strength, improve coordination and increase proprioception  to improve the patients ability to  perform daily tasks and return to PLOF        With   [x] TE   [x] TA   [] neuro   [] other: Patient Education: [x] Review HEP    [] Progressed/Changed HEP based on:   [x] positioning   [x] body mechanics   [] transfers   [] heat/ice application    [] other:      Other Objective/Functional Measures:   Pulleys completed in increments, as a rest break was needed. Added full cans x3 (flex, scap, abd) in seated position; able to complete flex and scap today     Pain Level (0-10 scale) post treatment: 0    ASSESSMENT/Changes in Function: Patient is making slow and gradual progress with therapy; AAROM through flexion in supine has greatly improved. Pt tolerance for activity continues to be limited. Patient will continue to benefit from skilled PT services to modify and progress therapeutic interventions, address functional mobility deficits, address ROM deficits, address strength deficits, analyze and address soft tissue restrictions, analyze and cue movement patterns, analyze and modify body mechanics/ergonomics and assess and modify postural abnormalities to attain remaining goals. []  See Plan of Care  [x]  See progress note/recertification  []  See Discharge Summary         Progress towards goals / Updated goals:  Updated Goals: to be achieved in 5 weeks:  1. Pt will increase FOTO score to 64 points to improve ability to perform ADLs. PN: 45  Current: assess at MD note   2. Pt will report an improvement in at best pain to at least 3/10 to improve ability to tolerate bathing.   PN: 4/10 at best  Current: progressing, 4/10 today at end of session   3. Pt will increase PROM left shoulder flex to at least 120 degs, ABD to 75 degs to improve ability to progress through protocol with increased ease. PN: PROM left shoulder flex 90 degs, ABD 30 degs, IR to stomach in neutral, ER to neutral in neutral (pain with all ranges except IR)  Current: progressing, Therapist able to attain [facility] PROM left shoulder flex today but she has increased pain around  degs flex PROM 7/9/2021. 4. Pt will report being able to don/doff a shirt independently to improve independence with dressing activities.   PN: continues to have pain with dressing but reports increased ability to don/doff shirt    PLAN  [x]  Upgrade activities as tolerated     [x]  Continue plan of care  []  Update interventions per flow sheet       []  Discharge due to:_  []  Other:_      Ced Vivas PTA 7/14/2021  1:40 PM    Future Appointments   Date Time Provider Deisy Stacia   7/16/2021 12:00 PM Richard Bal PT [facility]   7/16/2021  2:15 PM CHERELLE Matson [facility] BS AMB   7/19/2021  1:30 PM Fidel Chavarria PTA [facility]   7/21/2021  1:30 PM Ginny Vinson PTA [facility]   7/23/2021  1:30 PM Ginny Vinson PTA [facility]   8/16/2021 10:00 AM Victor Manuel Toth MD [facility] BS AMB

## 2021-07-14 NOTE — PROGRESS NOTES
Rafa Arroyo  1965     HISTORY OF PRESENT ILLNESS  Rafa Arroyo is a 64 y.o. female who presents today for evaluation s/p Left shoulder arthroscopic rotator cuff repair, upper border subscapularis and supraspinatus, glenohumeral joint debridement on 4/12/21. Patient has been going to PT. Describes pain as a 8/10 due to just finishing PT. Normally her pain is around 4-5/10. Has been taking tylenol for pain. She has been compliant with her exercises and restrictions. She reports doing her exercises 2-3 times a day and going to PT 3 times a week. She has crepitus with lifting her arm above 90 degrees. Patient denies any fever, chills, chest pain, shortness of breath or calf pain. There are no new illness or injuries to report since last seen in the office. PHYSICAL EXAM:   Visit Vitals  Pulse (!) 107   Temp 97.3 °F (36.3 °C) (Temporal)   Ht 5' 7\" (1.702 m)   Wt (!) 426 lb (193.2 kg)   SpO2 98%   BMI 66.72 kg/m²      The patient is a well-developed, well-nourished female in no acute distress. The patient is alert and oriented times three. The patient appears to be well groomed. Mood and affect are normal.  ORTHOPEDIC EXAM of left shoulder:  Inspection: swelling not present,  Bruising not present  Incision well healed  Passive glenohumeral abduction 0-80 degrees, 180 PFF, 130 AFF, 70 PER  Stability: Stable  Strength: n/a  2+ distal pulses    XR: 4 views of the left shoulder 7/16/21 do not show any acute bony abnormalities. IMPRESSION:  s/p Left shoulder arthroscopic rotator cuff repair, upper border subscapularis and supraspinatus, glenohumeral joint debridement    PLAN:   Pt doing well post operatively  Her ROM has improved since her last visit. Will continue PT and exercises now, order placed today. She will begin gentle strengthening. The crepitus she feels is likely scar tissue breaking up and we will continue to watch this for now.   Stressed to patient that nothing causes an increase in pain. Pt not given a refill of pain medication today. Given a note to remain out of work due to her job not having light duty.   RTC 6 weeks    [facility]

## 2021-07-16 ENCOUNTER — HOSPITAL ENCOUNTER (OUTPATIENT)
Dept: PHYSICAL THERAPY | Age: 56
Discharge: HOME | End: 2021-07-16
Attending: ORTHOPAEDIC SURGERY
Payer: COMMERCIAL

## 2021-07-16 ENCOUNTER — TELEPHONE (OUTPATIENT)
Dept: ORTHOPEDIC SURGERY | Age: 56
End: 2021-07-16

## 2021-07-16 ENCOUNTER — OFFICE VISIT (OUTPATIENT)
Dept: ORTHOPEDIC SURGERY | Age: 56
End: 2021-07-16
Payer: COMMERCIAL

## 2021-07-16 VITALS
BODY MASS INDEX: 45.99 KG/M2 | TEMPERATURE: 97.3 F | HEART RATE: 107 BPM | OXYGEN SATURATION: 98 % | HEIGHT: 67 IN | WEIGHT: 293 LBS

## 2021-07-16 DIAGNOSIS — S46.102A INJURY OF TENDON OF LONG HEAD OF LEFT BICEPS, INITIAL ENCOUNTER: ICD-10-CM

## 2021-07-16 DIAGNOSIS — Z98.890 STATUS POST ARTHROSCOPY OF LEFT SHOULDER: ICD-10-CM

## 2021-07-16 DIAGNOSIS — M75.122 NONTRAUMATIC COMPLETE TEAR OF LEFT ROTATOR CUFF: Primary | ICD-10-CM

## 2021-07-16 PROCEDURE — 99213 OFFICE O/P EST LOW 20 MIN: CPT | Performed by: ORTHOPAEDIC SURGERY

## 2021-07-16 PROCEDURE — 97110 THERAPEUTIC EXERCISES: CPT

## 2021-07-16 PROCEDURE — 97530 THERAPEUTIC ACTIVITIES: CPT

## 2021-07-16 PROCEDURE — 97014 ELECTRIC STIMULATION THERAPY: CPT

## 2021-07-16 PROCEDURE — 73030 X-RAY EXAM OF SHOULDER: CPT | Performed by: ORTHOPAEDIC SURGERY

## 2021-07-16 NOTE — LETTER
NOTIFICATION RETURN TO WORK / SCHOOL    7/16/2021 3:18 PM    Ms. Yamila Garces  [address] [facility] [address]      To Whom It May Concern:    Yamila Garces is currently under the care of [address] [place] [address]. She will remain out of work for 6 weeks and will be reevaluated at that time. If there are questions or concerns please have the patient contact our office.         Sincerely,      CHERELLE Duncan

## 2021-07-16 NOTE — PROGRESS NOTES
PT DAILY TREATMENT NOTE     Patient Name: Davonte Slaughter  Date:2021  : 1965  [x]  Patient  Verified  Payor: Krysten Zhou / Plan: Edenilson Nixon PPO / Product Type: PPO /    In time: 11:15  Out time: 12:05  Total Treatment Time (min): 50  Visit #: 7 of 10    Treatment Area: Pain in left shoulder [M25.512]    SUBJECTIVE  Pain Level (0-10 scale): 4  Any medication changes, allergies to medications, adverse drug reactions, diagnosis change, or new procedure performed?: [x] No    [] Yes (see summary sheet for update)  Subjective functional status/changes:   [] No changes reported  Patient reports a little pain, not too bad. States when the ice wore off last time, it went back up. OBJECTIVE    Modality rationale: decrease inflammation and decrease pain to improve the patients ability to  tolerate exercises and return to daily activity with ease.    Min Type Additional Details   15 [x] Estim:  [x]Unatt       [x]IFC  []Premod                        []Other:  [x]w/ice   []w/heat  Position: long sit  Location: left shoulder    [] Estim: []Att    []TENS instruct  []NMES                    []Other:  []w/US   []w/ice   []w/heat  Position:  Location:    []  Traction: [] Cervical       []Lumbar                       [] Prone          []Supine                       []Intermittent   []Continuous Lbs:  [] before manual  [] after manual    []  Ultrasound: []Continuous   [] Pulsed                           []1MHz   []3MHz W/cm2:  Location:    []  Iontophoresis with dexamethasone         Location: [] Take home patch   [] In clinic    []  Ice     []  heat  []  Ice massage  []  Laser   []  Anodyne Position:  Location:    []  Laser with stim  []  Other:  Position:  Location:    []  Vasopneumatic Device    []  Right     []  Left  Pre-treatment girth:  Post-treatment girth:  Measured at (location):  Pressure:       [] lo [] med [] hi   Temperature: [] lo [] med [] hi   [] Skin assessment post-treatment:  []intact []redness- no adverse reaction    []redness  adverse reaction:     25 min Therapeutic Exercise:  [x] See flow sheet :   Rationale: increase ROM and increase strength to improve the patients ability to perform ADLs with ease    10 min Therapeutic Activity:  [x]  See flow sheet :   Rationale: increase strength, improve coordination and increase proprioception  to improve the patients ability to  perform daily tasks and return to PLOF          With   [x] TE   [x] TA   [x] neuro   [] other: Patient Education: [x] Review HEP    [] Progressed/Changed HEP based on:   [x] positioning   [x] body mechanics   [] transfers   [] heat/ice application    [] other:      Other Objective/Functional Measures:   Tech assist  Held full can exercises as pain was too high   Challenged with OH wand flexion in supine     Pain Level (0-10 scale) post treatment: 6    ASSESSMENT/Changes in Function: Patient demonstrated low tolerance for therapy today, she arrived with moderate pain and had increase in symptoms during and post session. AAROM decreased today secondary to pain and stiffness. Patient will continue to benefit from skilled PT services to modify and progress therapeutic interventions, address functional mobility deficits, address ROM deficits, address strength deficits, analyze and address soft tissue restrictions, analyze and cue movement patterns, analyze and modify body mechanics/ergonomics and assess and modify postural abnormalities to attain remaining goals. []  See Plan of Care  []  See progress note/recertification  []  See Discharge Summary         Progress towards goals / Updated goals:  Updated Goals: to be achieved in 5 weeks:  1. Pt will increase FOTO score to 64 points to improve ability to perform ADLs. PN: 45  Current: assess at MD note   2. Pt will report an improvement in at best pain to at least 3/10 to improve ability to tolerate bathing.   PN: 4/10 at best  Current: progressing, 4/10 today at end of session   3. Pt will increase PROM left shoulder flex to at least 120 degs, ABD to 75 degs to improve ability to progress through protocol with increased ease. PN: PROM left shoulder flex 90 degs, ABD 30 degs, IR to stomach in neutral, ER to neutral in neutral (pain with all ranges except IR)  Current: progressing, Therapist able to attain [facility] PROM left shoulder flex today but she has increased pain around  degs flex PROM 7/9/2021. 4. Pt will report being able to don/doff a shirt independently to improve independence with dressing activities.   PN: continues to have pain with dressing but reports increased ability to don/doff shirt    PLAN  [x]  Upgrade activities as tolerated     [x]  Continue plan of care  []  Update interventions per flow sheet       []  Discharge due to:_  []  Other:_      Elza Nicolas PTA 7/16/2021  11:19 AM    Future Appointments   Date Time Provider Deisy Palomo   7/16/2021  2:15 PM CHERELLE Natarajan [facility] BS AMB   7/19/2021  1:30 PM Bolivar Roberts PTA [facility]   7/21/2021  1:30 PM Rasheed Frederick PTA [facility]   7/23/2021  1:30 PM Rasheed Frederick PTA [facility]   8/16/2021 10:00 AM Gerda Fraga MD [facility] BS AMB

## 2021-07-16 NOTE — TELEPHONE ENCOUNTER
Called to inform patient her paperwork from [employer] has been completed and fax.  Paperwork is placed in the scan bin to be scan in

## 2021-07-19 ENCOUNTER — HOSPITAL ENCOUNTER (OUTPATIENT)
Dept: PHYSICAL THERAPY | Age: 56
Discharge: HOME | End: 2021-07-19
Attending: ORTHOPAEDIC SURGERY
Payer: COMMERCIAL

## 2021-07-19 PROCEDURE — 97014 ELECTRIC STIMULATION THERAPY: CPT

## 2021-07-19 PROCEDURE — 97530 THERAPEUTIC ACTIVITIES: CPT

## 2021-07-19 PROCEDURE — 97110 THERAPEUTIC EXERCISES: CPT

## 2021-07-19 NOTE — PROGRESS NOTES
PT DAILY TREATMENT NOTE     Patient Name: Cortez Palomo  Date:2021  : 1965  [x]  Patient  Verified  Payor: Darin Zamora / Plan: Cl Holder PPO / Product Type: PPO /    In time:133  Out time:223  Total Treatment Time (min): 50  Visit #: 8 of 10      Treatment Area: Pain in left shoulder [M25.512]    SUBJECTIVE  Pain Level (0-10 scale): 5  Any medication changes, allergies to medications, adverse drug reactions, diagnosis change, or new procedure performed?: [x] No    [] Yes (see summary sheet for update)  Subjective functional status/changes:   [] No changes reported  Patient reports she's a little achy today, maybe because of the weather.     OBJECTIVE    Modality rationale: decrease inflammation and decrease pain to improve the patients ability to reduce post session soreness   Min Type Additional Details   15 [x] Estim:  [x]Unatt       [x]IFC  []Premod                        []Other:  [x]w/ice   []w/heat  Position: seated  Location: left shoulder    [] Estim: []Att    []TENS instruct  []NMES                    []Other:  []w/US   []w/ice   []w/heat  Position:  Location:    []  Traction: [] Cervical       []Lumbar                       [] Prone          []Supine                       []Intermittent   []Continuous Lbs:  [] before manual  [] after manual    []  Ultrasound: []Continuous   [] Pulsed                           []1MHz   []3MHz W/cm2:  Location:    []  Iontophoresis with dexamethasone         Location: [] Take home patch   [] In clinic    []  Ice     []  heat  []  Ice massage  []  Laser   []  Anodyne Position:  Location:    []  Laser with stim  []  Other:  Position:  Location:    []  Vasopneumatic Device    []  Right     []  Left  Pre-treatment girth:  Post-treatment girth:  Measured at (location):  Pressure:       [] lo [] med [] hi   Temperature: [] lo [] med [] hi   [x] Skin assessment post-treatment:  [x]intact []redness- no adverse reaction    []redness  adverse reaction: 25 min Therapeutic Exercise:  [x] See flow sheet :   Rationale: increase ROM and increase strength to improve the patients ability to increase activity tolerance    10 min Therapeutic Activity:  [x]  See flow sheet : functional reaching   Rationale: increase ROM, increase strength and improve coordination  to improve the patients ability to perform OH reaching and self care           With   [] TE   [] TA   [] neuro   [] other: Patient Education: [x] Review HEP    [] Progressed/Changed HEP based on:   [] positioning   [] body mechanics   [] transfers   [] heat/ice application    [] other:      Other Objective/Functional Measures:      Pain Level (0-10 scale) post treatment: 4    ASSESSMENT/Changes in Function: Patient demonstrated good tolerance to exercise today despite increased soreness. She is challenged with AROM completing limited reps of open can exercise in scaption due to pain and fatigue. She reported slight decrease in pain following session. Patient will continue to benefit from skilled PT services to modify and progress therapeutic interventions, address functional mobility deficits, address ROM deficits, address strength deficits, analyze and address soft tissue restrictions, analyze and cue movement patterns, analyze and modify body mechanics/ergonomics, assess and modify postural abnormalities, address imbalance/dizziness and instruct in home and community integration to attain remaining goals. []  See Plan of Care  []  See progress note/recertification  []  See Discharge Summary         Progress towards goals / Updated goals:  Updated Goals: to be achieved in 5 weeks:  1. Pt will increase FOTO score to 64 points to improve ability to perform ADLs. PN: 45  Current: assess at MD note   2. Pt will report an improvement in at best pain to at least 3/10 to improve ability to tolerate bathing. PN: 4/10 at best  Current: progressing, 4/10 today at end of session   3.  Pt will increase PROM left shoulder flex to at least 120 degs, ABD to 75 degs to improve ability to progress through protocol with increased ease. PN: PROM left shoulder flex 90 degs, ABD 30 degs, IR to stomach in neutral, ER to neutral in neutral (pain with all ranges except IR)  Current: progressing, Therapist able to attain [facility] PROM left shoulder flex today but she has increased pain around  degs flex PROM 7/9/2021. 4. Pt will report being able to don/doff a shirt independently to improve independence with dressing activities.   PN: continues to have pain with dressing but reports increased ability to don/doff shirt    PLAN  [x]  Upgrade activities as tolerated     [x]  Continue plan of care  []  Update interventions per flow sheet       []  Discharge due to:_  []  Other:_      Trini Ashford PTA 7/19/2021  1:33 PM    Future Appointments   Date Time Provider Deisy Stacia   7/21/2021  1:30 PM Haven Ndiaye PTA [facility]   7/23/2021  1:30 PM Haven Ndiaye PTA [facility]   8/16/2021 10:00 AM Daniel Cook MD [facility] BS AMB   8/24/2021 10:00 AM Joshua Camara MD [facility] BS AMB

## 2021-07-20 NOTE — TELEPHONE ENCOUNTER
Patient called stating Lupe alas has not received the office notes from 7/16/2021 and this is needed to complete her disability paperwork. Patient provided the fax number [phone]       Patient is requesting a call back at [phone] once completed.

## 2021-07-21 ENCOUNTER — APPOINTMENT (OUTPATIENT)
Dept: PHYSICAL THERAPY | Age: 56
End: 2021-07-21
Attending: ORTHOPAEDIC SURGERY
Payer: COMMERCIAL

## 2021-07-22 RX ORDER — ALBUTEROL SULFATE 90 UG/1
AEROSOL, METERED RESPIRATORY (INHALATION) AS NEEDED
COMMUNITY

## 2021-07-22 NOTE — PERIOP NOTES
PRE-SURGICAL INSTRUCTIONS        Patient's Name:  Tuan Suazo      Today's Date:  7/22/2021              Surgery Date:  7/30/2021                1. Do NOT eat or drink anything, including candy, gum, or ice chips after midnight on 7/30, unless you have specific instructions from your surgeon or anesthesia provider to do so.  2. You may brush your teeth before coming to the hospital.  3. No smoking 24 hours prior to the day of surgery. 4. No alcohol 24 hours prior to the day of surgery. 5. No recreational drugs for one week prior to the day of surgery. 6. Leave all valuables, including money/purse, at home. 7. Remove all jewelry, nail polish, acrylic nails, and makeup (including mascara); no lotions powders, deodorant, or perfume/cologne/after shave on the skin. 8. Glasses/contact lenses and dentures may be worn to the hospital.  They will be removed prior to surgery. 9. Call your doctor if symptoms of a cold or illness develop within 24-48 hours prior to your surgery. 10.  AN ADULT MUST DRIVE YOU HOME AFTER OUTPATIENT SURGERY. 11.  If you are having an outpatient procedure, please make arrangements for a responsible adult to be with you for 24 hours after your surgery. 12.  NO VISITORS in the hospital at this time for outpatient procedures. Exceptions may be made for surgical admissions, per nursing unit guidelines      Special Instructions:      Bring list of CURRENT medications. Bring inhaler. Bring any pertinent legal medical records. Take these medications the morning of surgery with a sip of water:  Per office    Follow physician instructions about stopping anticoagulants. Complete bowel prep per MD instructions. On the day of surgery, come in the main entrance of [facility]. Let the  at the desk know you are there for surgery. A staff member will come escort you to the surgical area on the second floor.     If you have any questions or concerns, please do not hesitate to call:     (Prior to the day of surgery) [facility] department:  [phone]   (Day of surgery) Pre-Op department:  [phone]    These surgical instructions were reviewed with the patient during the PAT phone call.

## 2021-07-23 ENCOUNTER — HOSPITAL ENCOUNTER (OUTPATIENT)
Dept: PHYSICAL THERAPY | Age: 56
Discharge: HOME | End: 2021-07-23
Attending: ORTHOPAEDIC SURGERY
Payer: COMMERCIAL

## 2021-07-23 ENCOUNTER — HOSPITAL ENCOUNTER (OUTPATIENT)
Dept: GENERAL RADIOLOGY | Age: 56
Discharge: HOME | End: 2021-07-23
Payer: COMMERCIAL

## 2021-07-23 DIAGNOSIS — M19.90 OSTEOARTHRITIS: ICD-10-CM

## 2021-07-23 DIAGNOSIS — M25.552 LEFT HIP PAIN: ICD-10-CM

## 2021-07-23 PROCEDURE — 73502 X-RAY EXAM HIP UNI 2-3 VIEWS: CPT

## 2021-07-23 PROCEDURE — 97530 THERAPEUTIC ACTIVITIES: CPT

## 2021-07-23 PROCEDURE — 97110 THERAPEUTIC EXERCISES: CPT

## 2021-07-23 PROCEDURE — 73562 X-RAY EXAM OF KNEE 3: CPT

## 2021-07-23 NOTE — PROGRESS NOTES
PT DAILY TREATMENT NOTE     Patient Name: Catherine Mccall  Date:2021  : 1965  [x]  Patient  Verified  Payor: Dieudonne Garcia / Plan: Sp Reasons PPO / Product Type: PPO /    In time: 1:30  Out time: 2:21  Total Treatment Time (min): 51  Visit #: 9 of 10    Treatment Area: Pain in left shoulder [M25.512]    SUBJECTIVE  Pain Level (0-10 scale): 4  Any medication changes, allergies to medications, adverse drug reactions, diagnosis change, or new procedure performed?: [x] No    [] Yes (see summary sheet for update)  Subjective functional status/changes:   [] No changes reported  Patient reports she \"just has pain at that constant 4\"   Reports the PA is writing a script for additional appts    OBJECTIVE     28 min Therapeutic Exercise:  [x] See flow sheet :   Rationale: increase ROM and increase strength to improve the patients ability to perform ADLs with ease    8 min Therapeutic Activity:  [x]  See flow sheet :   Rationale: improve coordination and increase proprioception  to improve the patients ability to  perform daily tasks and return to PLOF        With   [x] TE   [x] TA   [] neuro   [] other: Patient Education: [x] Review HEP    [] Progressed/Changed HEP based on:   [] positioning   [] body mechanics   [] transfers   [] heat/ice application    [] other:      Other Objective/Functional Measures:   Increased range on finger ladder to 26  Progressed web to green and gripper to 40#     Pain Level (0-10 scale) post treatment: 6    ASSESSMENT/Changes in Function: Patient is making gradual advancement towards strength goals. She is tolerating exercise progressions with minor fatigue, however able to complete well challenged. AROM is improving through shoulder flexion, abduction, and scaption. Pt is hoping to return to work at the end of Aug. Experienced some increase in pain post session from upgraded exercises; advised pt to ice before bed.     Patient will continue to benefit from skilled PT services to modify and progress therapeutic interventions, address functional mobility deficits, address ROM deficits, address strength deficits, analyze and address soft tissue restrictions, analyze and cue movement patterns, analyze and modify body mechanics/ergonomics and assess and modify postural abnormalities to attain remaining goals. []  See Plan of Care  [x]  See progress note/recertification  []  See Discharge Summary         Progress towards goals / Updated goals:  Updated Goals: to be achieved in 5 weeks:  1. Pt will increase FOTO score to 64 points to improve ability to perform ADLs. PN: 45  Current: assess at MD note   2. Pt will report an improvement in at best pain to at least 3/10 to improve ability to tolerate bathing. PN: 4/10 at best  Current: progressing, 4/10 today at end of session   3. Pt will increase PROM left shoulder flex to at least 120 degs, ABD to 75 degs to improve ability to progress through protocol with increased ease. PN: PROM left shoulder flex 90 degs, ABD 30 degs, IR to stomach in neutral, ER to neutral in neutral (pain with all ranges except IR)  Current: progressing, Therapist able to attain [facility] PROM left shoulder flex today but she has increased pain around  degs flex PROM 7/9/2021. 4. Pt will report being able to don/doff a shirt independently to improve independence with dressing activities.   PN: continues to have pain with dressing but reports increased ability to don/doff shirt    PLAN  [x]  Upgrade activities as tolerated     [x]  Continue plan of care  []  Update interventions per flow sheet       []  Discharge due to:_  []  Other:_      Alexis Portillo PTA 7/23/2021  9:25 AM    Future Appointments   Date Time Provider Deisy Palomo   7/23/2021  1:30 PM [facility]   8/16/2021 10:00 AM Cherise Klein MD [facility] BS AMB   8/24/2021 10:00 AM Sowmya Galvan MD [facility] BS AMB

## 2021-07-26 ENCOUNTER — HOSPITAL ENCOUNTER (OUTPATIENT)
Dept: PREADMISSION TESTING | Age: 56
Discharge: HOME | End: 2021-07-26
Payer: COMMERCIAL

## 2021-07-26 ENCOUNTER — TRANSCRIBE ORDER (OUTPATIENT)
Dept: REGISTRATION | Age: 56
End: 2021-07-26

## 2021-07-26 DIAGNOSIS — Z01.818 OTHER SPECIFIED PRE-OPERATIVE EXAMINATION: Primary | ICD-10-CM

## 2021-07-26 DIAGNOSIS — Z01.818 OTHER SPECIFIED PRE-OPERATIVE EXAMINATION: ICD-10-CM

## 2021-07-26 PROCEDURE — U0003 INFECTIOUS AGENT DETECTION BY NUCLEIC ACID (DNA OR RNA); SEVERE ACUTE RESPIRATORY SYNDROME CORONAVIRUS 2 (SARS-COV-2) (CORONAVIRUS DISEASE [COVID-19]), AMPLIFIED PROBE TECHNIQUE, MAKING USE OF HIGH THROUGHPUT TECHNOLOGIES AS DESCRIBED BY CMS-2020-01-R: HCPCS

## 2021-07-26 PROCEDURE — 36415 COLL VENOUS BLD VENIPUNCTURE: CPT

## 2021-07-27 LAB — SARS-COV-2, COV2NT: NOT DETECTED

## 2021-07-28 ENCOUNTER — HOSPITAL ENCOUNTER (OUTPATIENT)
Dept: PHYSICAL THERAPY | Age: 56
Discharge: HOME | End: 2021-07-28
Attending: ORTHOPAEDIC SURGERY
Payer: COMMERCIAL

## 2021-07-28 PROCEDURE — 97014 ELECTRIC STIMULATION THERAPY: CPT

## 2021-07-28 PROCEDURE — 97530 THERAPEUTIC ACTIVITIES: CPT | Performed by: PHYSICAL THERAPIST

## 2021-07-28 PROCEDURE — 97110 THERAPEUTIC EXERCISES: CPT

## 2021-07-28 PROCEDURE — 97110 THERAPEUTIC EXERCISES: CPT | Performed by: PHYSICAL THERAPIST

## 2021-07-28 PROCEDURE — 97014 ELECTRIC STIMULATION THERAPY: CPT | Performed by: PHYSICAL THERAPIST

## 2021-07-28 PROCEDURE — 97530 THERAPEUTIC ACTIVITIES: CPT

## 2021-07-28 NOTE — PROGRESS NOTES
In [place] [address], [address] [place] [address], [address] [place], [place] [address]y [address],[address]  [phone] fax    Progress Note  Patient name: Maddy Oleary Start of Care: 2021   Referral source: Art Jacob, [address] : 1965               Medical Diagnosis: Pain in left shoulder [M25.512]  Payor: Sandra Alberto / Plan: [employer] PPO / Product Type: PPO /  Onset Date: DOS 2021               Treatment Diagnosis: left shoulder pain   Prior Hospitalization: see medical history Provider#: [id number]   Medications: Verified on Patient summary List    Comorbidities: asthma, lymphedema.    Prior Level of Function: Independent with ADls, functional, and work activities with pain in the left shoulder before surgery.   Visits from Start of Care: 19    Missed Visits: 1    Established Goals:         Excellent           Good         Limited           None  [x] Increased ROM   []  [x]  []  []  [] Increased Strength  []  []  []  []  [x] Increased Mobility  []  [x]  []  []   [x] Decreased Pain   []  [x]  []  []  [] Decreased Swelling  []  []  []  []    Key Functional Changes/Current goals:   1. Pt will increase FOTO score to 64 points to improve ability to perform ADLs. PN: 45  Current: not met, reassess at upcoming sessions 2021  2. Pt will report an improvement in at best pain to at least 3/10 to improve ability to tolerate bathing. PN: 4/10 at best  Current: MET, 3/10 2021  3. Pt will increase PROM left shoulder flex to at least 120 degs, ABD to 75 degs to improve ability to progress through protocol with increased ease. PN: PROM left shoulder flex 90 degs, ABD 30 degs, IR to stomach in neutral, ER to neutral in neutral (pain with all ranges except IR)  Current: left shoulder AROM: flex 85 degs with pain, ABD 70 degs with pain, ER 55 degs, IR 60 degs 2021  4.  Pt will report being able to don/doff a shirt independently to improve independence with dressing activities. PN: continues to have pain with dressing but reports increased ability to don/doff shirt  Current: MET, able to don/doff her shirts 7/28/2021    Updated Goals: to be achieved in 5 weeks:  1. Pt will increase FOTO score to 64 points to improve ability to perform ADLs. PN: reassess at upcoming sessions  2. Pt will increase AROM left shoulder flex to at least 120 degs, ABD to 100 degs, ER to 65 degs, and IR to 70 degs to improve ability to tolerate dressing tasks. PN: left shoulder AROM: flex 85 degs with pain, ABD 70 degs with pain, ER 55 degs, IR 60 degs   3. Pt will be able to reach into a shoulder height shelf with the left UE with mild to no pain or difficulty to improve independence in the home. PN: reports she is unable to reach into a shelf with the left UE.     ASSESSMENT/RECOMMENDATIONS:  Pt reports/demonstrates improvements in ROM, mobility and activity tolerance since the last session. She is cleared by the PA to resume gentle strengthening of the left shoulder and did initiate gentle strengthening exercises. She continues to have limitations with her ROM and overall strength/mobility. We will plan on continuing therapy to improve the pt's ROM and pain.    [x]Continue therapy per initial plan/protocol at a frequency of  2 x per week for 5 weeks  []Continue therapy with the following recommended changes:_____________________      _____________________________________________________________________  []Discontinue therapy progressing towards or have reached established goals  []Discontinue therapy due to lack of appreciable progress towards goals  []Discontinue therapy due to lack of attendance or compliance  []Await Physician's recommendations/decisions regarding therapy  []Other:________________________________________________________________    Thank you for this referral.   Elvia Alston, PT 7/28/2021 1:20 PM  NOTE TO PHYSICIAN:  PLEASE COMPLETE THE ORDERS BELOW AND   FAX TO [facility]: [phone]  If you are unable to process this request in 24 hours please contact our office: [phone]    []  I have read the above report and request that my patient continue as recommended. []  I have read the above report and request that my patient continue therapy with the following changes/special instructions:________________________________________  []I have read the above report and request that my patient be discharged from therapy.     Physician's Signature:____________Date:_________TIME:________     CHERELLE Natarajan  ** Signature, Date and Time must be completed for valid certification **

## 2021-07-29 ENCOUNTER — ANESTHESIA EVENT (OUTPATIENT)
Dept: ENDOSCOPY | Age: 56
End: 2021-07-29
Payer: COMMERCIAL

## 2021-07-30 ENCOUNTER — HOSPITAL ENCOUNTER (OUTPATIENT)
Age: 56
Setting detail: OUTPATIENT SURGERY
Discharge: HOME | End: 2021-07-30
Attending: INTERNAL MEDICINE | Admitting: INTERNAL MEDICINE
Payer: COMMERCIAL

## 2021-07-30 ENCOUNTER — ANESTHESIA (OUTPATIENT)
Dept: ENDOSCOPY | Age: 56
End: 2021-07-30
Payer: COMMERCIAL

## 2021-07-30 VITALS
HEART RATE: 70 BPM | TEMPERATURE: 97.1 F | BODY MASS INDEX: 45.99 KG/M2 | SYSTOLIC BLOOD PRESSURE: 127 MMHG | HEIGHT: 67 IN | RESPIRATION RATE: 16 BRPM | DIASTOLIC BLOOD PRESSURE: 84 MMHG | OXYGEN SATURATION: 100 % | WEIGHT: 293 LBS

## 2021-07-30 PROCEDURE — 2709999900 HC NON-CHARGEABLE SUPPLY: Performed by: INTERNAL MEDICINE

## 2021-07-30 PROCEDURE — 77030013992 HC SNR POLYP ENDOSC BSC -B: Performed by: INTERNAL MEDICINE

## 2021-07-30 PROCEDURE — 77030008565 HC TBNG SUC IRR ERBE -B: Performed by: INTERNAL MEDICINE

## 2021-07-30 PROCEDURE — 74011000250 HC RX REV CODE- 250: Performed by: NURSE ANESTHETIST, CERTIFIED REGISTERED

## 2021-07-30 PROCEDURE — 88305 TISSUE EXAM BY PATHOLOGIST: CPT

## 2021-07-30 PROCEDURE — 76040000007: Performed by: INTERNAL MEDICINE

## 2021-07-30 PROCEDURE — 74011250636 HC RX REV CODE- 250/636: Performed by: NURSE ANESTHETIST, CERTIFIED REGISTERED

## 2021-07-30 PROCEDURE — 00811 ANES LWR INTST NDSC NOS: CPT | Performed by: ANESTHESIOLOGY

## 2021-07-30 PROCEDURE — 77030021593 HC FCPS BIOP ENDOSC BSC -A: Performed by: INTERNAL MEDICINE

## 2021-07-30 PROCEDURE — 74011250637 HC RX REV CODE- 250/637: Performed by: INTERNAL MEDICINE

## 2021-07-30 PROCEDURE — 74011250637 HC RX REV CODE- 250/637: Performed by: NURSE ANESTHETIST, CERTIFIED REGISTERED

## 2021-07-30 PROCEDURE — 00811 ANES LWR INTST NDSC NOS: CPT | Performed by: NURSE ANESTHETIST, CERTIFIED REGISTERED

## 2021-07-30 PROCEDURE — 76060000032 HC ANESTHESIA 0.5 TO 1 HR: Performed by: INTERNAL MEDICINE

## 2021-07-30 RX ORDER — LIDOCAINE HYDROCHLORIDE 20 MG/ML
INJECTION, SOLUTION EPIDURAL; INFILTRATION; INTRACAUDAL; PERINEURAL AS NEEDED
Status: DISCONTINUED | OUTPATIENT
Start: 2021-07-30 | End: 2021-07-30 | Stop reason: HOSPADM

## 2021-07-30 RX ORDER — FAMOTIDINE 20 MG/1
20 TABLET, FILM COATED ORAL ONCE
Status: COMPLETED | OUTPATIENT
Start: 2021-07-30 | End: 2021-07-30

## 2021-07-30 RX ORDER — PROPOFOL 10 MG/ML
INJECTION, EMULSION INTRAVENOUS AS NEEDED
Status: DISCONTINUED | OUTPATIENT
Start: 2021-07-30 | End: 2021-07-30 | Stop reason: HOSPADM

## 2021-07-30 RX ORDER — SODIUM CHLORIDE, SODIUM LACTATE, POTASSIUM CHLORIDE, CALCIUM CHLORIDE 600; 310; 30; 20 MG/100ML; MG/100ML; MG/100ML; MG/100ML
25 INJECTION, SOLUTION INTRAVENOUS CONTINUOUS
Status: DISCONTINUED | OUTPATIENT
Start: 2021-07-30 | End: 2021-07-30 | Stop reason: HOSPADM

## 2021-07-30 RX ORDER — DEXTROMETHORPHAN/PSEUDOEPHED 2.5-7.5/.8
DROPS ORAL AS NEEDED
Status: DISCONTINUED | OUTPATIENT
Start: 2021-07-30 | End: 2021-07-30 | Stop reason: HOSPADM

## 2021-07-30 RX ADMIN — FAMOTIDINE 20 MG: 20 TABLET ORAL at 09:27

## 2021-07-30 RX ADMIN — PROPOFOL 50 MG: 10 INJECTION, EMULSION INTRAVENOUS at 10:10

## 2021-07-30 RX ADMIN — PROPOFOL 50 MG: 10 INJECTION, EMULSION INTRAVENOUS at 10:05

## 2021-07-30 RX ADMIN — PROPOFOL 50 MG: 10 INJECTION, EMULSION INTRAVENOUS at 10:01

## 2021-07-30 RX ADMIN — SODIUM CHLORIDE, SODIUM LACTATE, POTASSIUM CHLORIDE, AND CALCIUM CHLORIDE 25 ML/HR: 600; 310; 30; 20 INJECTION, SOLUTION INTRAVENOUS at 09:26

## 2021-07-30 RX ADMIN — PROPOFOL 50 MG: 10 INJECTION, EMULSION INTRAVENOUS at 10:08

## 2021-07-30 RX ADMIN — PROPOFOL 70 MG: 10 INJECTION, EMULSION INTRAVENOUS at 10:12

## 2021-07-30 RX ADMIN — LIDOCAINE HYDROCHLORIDE 60 MG: 20 INJECTION, SOLUTION EPIDURAL; INFILTRATION; INTRACAUDAL; PERINEURAL at 09:53

## 2021-07-30 RX ADMIN — PROPOFOL 100 MG: 10 INJECTION, EMULSION INTRAVENOUS at 09:53

## 2021-07-30 RX ADMIN — PROPOFOL 50 MG: 10 INJECTION, EMULSION INTRAVENOUS at 09:57

## 2021-07-30 RX ADMIN — PROPOFOL 50 MG: 10 INJECTION, EMULSION INTRAVENOUS at 09:54

## 2021-07-30 NOTE — ANESTHESIA PREPROCEDURE EVALUATION
Relevant Problems   ENDOCRINE   (+) Obesity, morbid ([address]       Anesthetic History   No history of anesthetic complications            Review of Systems / Medical History  Patient summary reviewed and pertinent labs reviewed    Pulmonary            Asthma : well controlled       Neuro/Psych   Within defined limits           Cardiovascular  Within defined limits                Exercise tolerance: >4 METS     GI/Hepatic/Renal  Within defined limits              Endo/Other        Morbid obesity and arthritis     Other Findings              Physical Exam    Airway  Mallampati: I  TM Distance: 4 - 6 cm  Neck ROM: normal range of motion   Mouth opening: Normal     Cardiovascular  Regular rate and rhythm,  S1 and S2 normal,  no murmur, click, rub, or gallop  Rhythm: regular  Rate: normal         Dental    Dentition: Lower dentition intact and Upper dentition intact  Comments: Broken L lower molar   Pulmonary  Breath sounds clear to auscultation               Abdominal  GI exam deferred       Other Findings            Anesthetic Plan    ASA: 3  Anesthesia type: MAC - interscalene block          Induction: Intravenous  Anesthetic plan and risks discussed with: Patient

## 2021-07-30 NOTE — PROGRESS NOTES
Patient may resume Eliquis in 48 hours. Stacey Murphy MD  [facility] [address] - [phone]  [email or web address]

## 2021-07-30 NOTE — H&P
[email or web address]  [phone]      History and Physical    Patient: Jason Gr MRN: [id number]  SSN: [id number]    YOB: 1965  Age: 64 y.o. Sex: female      Subjective:      Jason Gr is a 64 y.o. female who presents for routine, average risk CRCS. Pt denies symptoms or concerns. .     Past Medical History:   Diagnosis Date    Adverse effect of anesthesia     \"Epidural\" allergy    Anemia     anemia    Arthritis     lft knee     Asthma     Lymphedema     R leg    Obesity     Obesity     Pulmonary emboli ([address] 2021    DVTs lower extremities, denies PE     Past Surgical History:   Procedure Laterality Date    HX  SECTION      HX ROTATOR CUFF REPAIR Left 2021    OK ANESTH,SURGERY OF SHOULDER Left 2021      Family History   Problem Relation Age of Onset    Cancer Mother     Diabetes Mother     Hypertension Mother     Cancer Father     No Known Problems Brother      Social History     Tobacco Use    Smoking status: Never Smoker    Smokeless tobacco: Never Used   Substance Use Topics    Alcohol use: No      Prior to Admission medications    Medication Sig Start Date End Date Taking? Authorizing Provider   albuterol (PROVENTIL HFA, VENTOLIN HFA, PROAIR HFA) 90 mcg/actuation inhaler Take  by inhalation as needed for Wheezing. Yes Provider, Historical   cyclobenzaprine (FLEXERIL) 10 mg tablet TAKE 1 TABLET BY MOUTH NIGHTLY. INDICATIONS: MUSCLE SPASM 21  Yes CHERELLE Seo   traMADoL (ULTRAM) 50 mg tablet Take 50 mg by mouth every six (6) hours as needed for Pain. Yes Provider, Historical   apixaban (Eliquis DVT-PE Treat 30D Start) 5 mg (74 tabs) starter pack Take 10 mg (two 5 mg tablets) by mouth twice a day for 7 days   Followed by 5 mg (one 5 mg tablet) by mouth twice a day 21  Yes Good Ron MD   multivitamin (ONE A DAY) tablet Take 1 Tab by mouth daily.    Yes Provider, Historical   cyanocobalamin, vitamin B-12, (Vitamin B-12) 5,000 mcg subl 5,000 mcg by SubLINGual route daily. Dissolvable   Yes Provider, Historical   acetaminophen (Tylenol Extra Strength) 500 mg tablet Take  by mouth every six (6) hours as needed for Pain. Yes Provider, Historical        Allergies   Allergen Reactions    Other Medication Itching     Patient states \"Epidural\"     Tetanus And Diphtheria Toxoids Hives and Nausea and Vomiting       Review of Systems:  A comprehensive review of systems was negative except for that written in the History of Present Illness. Objective:     Vitals:    07/22/21 1121   Weight: (!) 188.2 kg (415 lb)   Height: 5' 7\" (1.702 m)        Physical Exam:  GENERAL: alert, cooperative, no distress, appears stated age  LUNG: clear to auscultation bilaterally  HEART: regular rate and rhythm, S1, S2 normal, no murmur, click, rub or gallop  ABDOMEN: soft, non-tender. Bowel sounds normal. No masses,  no organomegaly  NEUROLOGIC: alert & oriented x 3    Assessment:     1. Colorectal Cancer Screening    Plan:     1. Colonoscopy    Signed By: Aura Srinivasan MD     July 30, 2021      Aura Srinivasan MD  [facility] [address] [phone]  [email or web address]

## 2021-07-30 NOTE — DISCHARGE INSTRUCTIONS
Colonoscopy: What to Expect at [address] [place] [address]  After you have a colonoscopy, you will stay at the clinic for 1 to 2 hours until the medicines wear off. Then you can go home. But you will need to arrange for a ride. Your doctor will tell you when you can eat and do your other usual activities. Your doctor will talk to you about when you will need your next colonoscopy. Your doctor can help you decide how often you need to be checked. This will depend on the results of your test and your risk for colorectal cancer. After the test, you may be bloated or have gas pains. You may need to pass gas. If a biopsy was done or a polyp was removed, you may have streaks of blood in your stool (feces) for a few days. This care sheet gives you a general idea about how long it will take for you to recover. But each person recovers at a different pace. Follow the steps below to get better as quickly as possible. How can you care for yourself at home? Activity  · Rest when you feel tired. · You can do your normal activities when it feels okay to do so. Diet  · Follow your doctor's directions for eating. · Unless your doctor has told you not to, drink plenty of fluids. This helps to replace the fluids that were lost during the colon prep. · Do not drink alcohol. Medicines  · If polyps were removed or a biopsy was done during the test, your doctor may tell you not to take aspirin or other anti-inflammatory medicines for a few days. These include ibuprofen (Advil, Motrin) and naproxen (Aleve). Other instructions  · For your safety, do not drive or operate machinery until the medicine wears off and you can think clearly. Your doctor may tell you not to drive or operate machinery until the day after your test.  · Do not sign legal documents or make major decisions until the medicine wears off and you can think clearly. The anesthesia can make it hard for you to fully understand what you are agreeing to.   Follow-up care is a key part of your treatment and safety. Be sure to make and go to all appointments, and call your doctor if you are having problems. It's also a good idea to know your test results and keep a list of the medicines you take. When should you call for help? Call 911 anytime you think you may need emergency care. For example, call if:  · You passed out (lost consciousness). · You pass maroon or bloody stools. · You have severe belly pain. Call your doctor now or seek immediate medical care if:  · Your stools are black and tarlike. · Your stools have streaks of blood, but you did not have a biopsy or any polyps removed. · You have belly pain, or your belly is swollen and firm. · You vomit. · You have a fever. · You are very dizzy. Watch closely for changes in your health, and be sure to contact your doctor if you have any problems. Where can you learn more? Go to [employer]  Enter E264 in the search box to learn more about \"Colonoscopy: What to Expect at Home. \"   © 0421-4879 Healthwise[employer] Incorporated. Care instructions adapted under license by [employer] (which disclaims liability or warranty for this information). This care instruction is for use with your licensed healthcare professional. If you have questions about a medical condition or this instruction, always ask your healthcare professional. [place] [address] any warranty or liability for your use of this information. Content Version: 41.2.310894; Current as of: November 14, 2014  Patient Education      Diverticulosis: Care Instructions  Your Care Instructions  In diverticulosis, pouches called diverticula form in the wall of the large intestine (colon). The pouches do not cause any pain or other symptoms. Most people who have diverticulosis do not know they have it. But the pouches sometimes bleed, and if they become infected, they can cause pain and other symptoms.  When this happens, it is called diverticulitis. Diverticula form when pressure pushes the wall of the colon outward at certain weak points. A diet that is too low in fiber can cause diverticula. Follow-up care is a key part of your treatment and safety. Be sure to make and go to all appointments, and call your doctor if you are having problems. It's also a good idea to know your test results and keep a list of the medicines you take. How can you care for yourself at home? · Include fruits, leafy green vegetables, beans, and whole grains in your diet each day. These foods are high in fiber. · Take a fiber supplement, such as Citrucel or Metamucil, every day if needed. Read and follow all instructions on the label. · Drink plenty of fluids. If you have kidney, heart, or liver disease and have to limit fluids, talk with your doctor before you increase the amount of fluids you drink. · Get at least 30 minutes of exercise on most days of the week. Walking is a good choice. You also may want to do other activities, such as running, swimming, cycling, or playing tennis or team sports. · Cut out foods that cause gas, pain, or other symptoms. When should you call for help? Call your doctor now or seek immediate medical care if:    · You have belly pain.     · You pass maroon or very bloody stools.     · You have a fever.     · You have nausea and vomiting.     · You have unusual changes in your bowel movements or abdominal swelling.     · You have burning pain when you urinate.     · You have abnormal vaginal discharge.     · You have shoulder pain.     · You have cramping pain that does not get better when you have a bowel movement or pass gas.     · You pass gas or stool from your urethra while urinating. Watch closely for changes in your health, and be sure to contact your doctor if you have any problems. Where can you learn more?   Go to http://www.gray.com/  Enter [id number] in the search box to learn more about \"Diverticulosis: Care Instructions. \"  Current as of: April 15, 2020               Content Version: 12.8  © 2006-2021 [employer]. Care instructions adapted under license by [employer] (which disclaims liability or warranty for this information). If you have questions about a medical condition or this instruction, always ask your healthcare professional. [address] any warranty or liability for your use of this information. Patient Education      High-Fiber Diet: Care Instructions  Your Care Instructions     A high-fiber diet may help you relieve constipation and feel less bloated. Your doctor and dietitian will help you make a high-fiber eating plan based on your personal needs. The plan will include the things you like to eat. It will also make sure that you get 30 grams of fiber a day. Before you make changes to the way you eat, be sure to talk with your doctor or dietitian. Follow-up care is a key part of your treatment and safety. Be sure to make and go to all appointments, and call your doctor if you are having problems. It's also a good idea to know your test results and keep a list of the medicines you take. How can you care for yourself at home? · You can increase how much fiber you get if you eat more of certain foods. These foods include:  ? Whole-grain breads and cereals. ? Fruits, such as pears, apples, and peaches. Eat the skins, peels, and seeds, if you can.  ? Vegetables, such as broccoli, cabbage, spinach, carrots, asparagus, and squash. ? Starchy vegetables. These include potatoes with skins, kidney beans, and lima beans. · Take a fiber supplement every day if your doctor recommends it. Examples are Benefiber, Citrucel, FiberCon, and Metamucil. Ask your doctor how much to take. · Drink plenty of fluids.  If you have kidney, heart, or liver disease and have to limit fluids, talk with your doctor before you increase the amount of fluids you drink.  · Get some exercise every day. Exercise helps stool move through the colon. It also helps prevent constipation. · Keep a food diary. Try to notice and write down what foods cause gas, pain, or other symptoms. Then you can avoid these foods. Where can you learn more? Go to http://www.gray.com/  Enter A441 in the search box to learn more about \"High-Fiber Diet: Care Instructions. \"  Current as of: December 17, 2020               Content Version: 12.8  © 2006-2021 [employer]. Care instructions adapted under license by [employer] (which disclaims liability or warranty for this information). If you have questions about a medical condition or this instruction, always ask your healthcare professional. [place] [address] any warranty or liability for your use of this information. Patient Education      Colon Polyps: Care Instructions  Your Care Instructions     Colon polyps are growths in the colon or the rectum. The cause of most colon polyps is not known, and most people who get them do not have any problems. But a certain kind can turn into cancer. For this reason, regular testing for colon polyps is important for people as they get older. It is also important for anyone who has an increased risk for colon cancer. Polyps are usually found through routine colon cancer screening tests. Although most colon polyps are not cancerous, they are usually removed and then tested for cancer. Screening for colon cancer saves lives because the cancer can usually be cured if it is caught early. If you have a polyp that is the type that can turn into cancer, you may need more tests to examine your entire colon. The doctor will remove any other polyps that he or she finds, and you will be tested more often. Follow-up care is a key part of your treatment and safety.  Be sure to make and go to all appointments, and call your doctor if you are having problems. It's also a good idea to know your test results and keep a list of the medicines you take. How can you care for yourself at home? Regular exams to look for colon polyps are the best way to prevent polyps from turning into colon cancer. These can include stool tests, sigmoidoscopy, colonoscopy, and CT colonography. Talk with your doctor about a testing schedule that is right for you. To prevent polyps  There is no home treatment that can prevent colon polyps. But these steps may help lower your risk for cancer. · Stay active. Being active can help you get to and stay at a healthy weight. Try to exercise on most days of the week. Walking is a good choice. · Eat well. Choose a variety of vegetables, fruits, legumes (such as peas and beans), fish, poultry, and whole grains. · Do not smoke. If you need help quitting, talk to your doctor about stop-smoking programs and medicines. These can increase your chances of quitting for good. · If you drink alcohol, limit how much you drink. Limit alcohol to 2 drinks a day for men and 1 drink a day for women. When should you call for help? Call your doctor now or seek immediate medical care if:    · You have severe belly pain.     · Your stools are maroon or very bloody. Watch closely for changes in your health, and be sure to contact your doctor if:    · You have a fever.     · You have nausea or vomiting.     · You have a change in bowel habits (new constipation or diarrhea).     · Your symptoms get worse or are not improving as expected. Where can you learn more? Go to http://www.EZprints.com.com/  Enter C571 in the search box to learn more about \"Colon Polyps: Care Instructions. \"  Current as of: December 17, 2020               Content Version: 12.8  © 9208-6960 [employer]. Care instructions adapted under license by [employer] (which disclaims liability or warranty for this information).  If you have questions about a medical condition or this instruction, always ask your healthcare professional. [facility] [address] any warranty or liability for your use of this information. Patient Education      Hemorrhoids: Care Instructions  Your Care Instructions     Hemorrhoids are enlarged veins that develop in the anal canal. Bleeding during bowel movements, itching, swelling, and rectal pain are the most common symptoms. They can be uncomfortable at times, but hemorrhoids rarely are a serious problem. You can treat most hemorrhoids with simple changes to your diet and bowel habits. These changes include eating more fiber and not straining to pass stools. Most hemorrhoids do not need surgery or other treatment unless they are very large and painful or bleed a lot. Follow-up care is a key part of your treatment and safety. Be sure to make and go to all appointments, and call your doctor if you are having problems. It's also a good idea to know your test results and keep a list of the medicines you take. How can you care for yourself at home? · Sit in a few inches of warm water (sitz bath) 3 times a day and after bowel movements. The warm water helps with pain and itching. · Put ice on your anal area several times a day for 10 minutes at a time. Put a thin cloth between the ice and your skin. Follow this by placing a warm, wet towel on the area for another 10 to 20 minutes. · Take pain medicines exactly as directed. ? If the doctor gave you a prescription medicine for pain, take it as prescribed. ? If you are not taking a prescription pain medicine, ask your doctor if you can take an over-the-counter medicine. · Keep the anal area clean, but be gentle. Use water and a fragrance-free soap, such as [place], or use baby wipes or medicated pads, such as Tucks. · Wear cotton underwear and loose clothing to decrease moisture in the anal area. · Eat more fiber.  Include foods such as whole-grain breads and cereals, raw vegetables, raw and dried fruits, and beans. · Drink plenty of fluids. If you have kidney, heart, or liver disease and have to limit fluids, talk with your doctor before you increase the amount of fluids you drink. · Use a stool softener that contains bran or psyllium. You can save money by buying bran or psyllium (available in bulk at most health food stores) and sprinkling it on foods or stirring it into fruit juice. Or you can use a product such as Metamucil or Hydrocil. · Practice healthy bowel habits. ? Go to the bathroom as soon as you have the urge. ? Avoid straining to pass stools. Relax and give yourself time to let things happen naturally. ? Do not hold your breath while passing stools. ? Do not read while sitting on the toilet. Get off the toilet as soon as you have finished. · Take your medicines exactly as prescribed. Call your doctor if you think you are having a problem with your medicine. When should you call for help? Call 911 anytime you think you may need emergency care. For example, call if:    · You pass maroon or very bloody stools. Call your doctor now or seek immediate medical care if:    · You have increased pain.     · You have increased bleeding. Watch closely for changes in your health, and be sure to contact your doctor if:    · Your symptoms have not improved after 3 or 4 days. Where can you learn more? Go to http://www.pratt.com/  Enter F228 in the search box to learn more about \"Hemorrhoids: Care Instructions. \"  Current as of: April 15, 2020               Content Version: 12.8  © 2006-2021 [employer]. Care instructions adapted under license by [employer] (which disclaims liability or warranty for this information).  If you have questions about a medical condition or this instruction, always ask your healthcare professional. [address] any warranty or liability for your use of this information. DISCHARGE SUMMARY from Nurse     POST-PROCEDURE INSTRUCTIONS:    Call your Physician if you:  ? Observe any excess bleeding. ? Develop a temperature over 100.5o F.  ? Experience abdominal, shoulder or chest pain. ? Notice any signs of decreased circulation or nerve impairment to an extremity such as a change in color, persistent numbness, tingling, coldness or increase in pain. ? Vomit blood or you have nausea and vomiting lasting longer than 4 hours. ? Are unable to take medications. ? Are unable to urinate within 8 hours after discharge following general anesthesia or intravenous sedation. For the next 24 hours after receiving general anesthesia or intravenous sedation, or while taking prescription Narcotics, limit your activities:  ? Do NOT drive a motor vehicle, operate hazard machinery or power tools, or perform tasks that require coordination. The medication you received during your procedure may have some effect on your mental awareness. ? Do NOT make important personal or business decisions. The medication you received during your procedure may have some effect on your mental awareness. ? Do NOT drink alcoholic beverages. These drinks do not mix well with the medications that have been given to you. ? Upon discharge from the hospital, you must be accompanied by a responsible adult. ? Resume your diet as directed by your physician. ? Resume medications as your physician has prescribed. ? Please give a list of your current medications to your Primary Care Provider. ? Please update this list whenever your medications are discontinued, doses are changed, or new medications (including over-the-counter products) are added. ? Please carry medication information at all times in case of emergency situations. These are general instructions for a healthy lifestyle:    No smoking/ No tobacco products/ Avoid exposure to second hand smoke.    Surgeon General's Warning:  Quitting smoking now greatly reduces serious risk to your health. Obesity, smoking, and a sedentary lifestyle greatly increase your risk for illness.  A healthy diet, regular physical exercise & weight monitoring are important for maintaining a healthy lifestyle   You may be retaining fluid if you have a history of heart failure or if you experience any of the following symptoms:  Weight gain of 3 pounds or more overnight or 5 pounds in a week, increased swelling in our hands or feet or shortness of breath while lying flat in bed. Please call your doctor as soon as you notice any of these symptoms; do not wait until your next office visit. Recognize signs and symptoms of STROKE:  F  -  Face looks uneven  A  -  Arms unable to move or move unevenly  S  -  Speech slurred or non-existent  T  -  Time to call 911 - as soon as signs and symptoms begin - DO NOT go back to bed or wait to see If you get better - TIME IS BRAIN. Colorectal Screening   Colorectal cancer almost always develops from precancerous polyps (abnormal growths) in the colon or rectum. Screening tests can find precancerous polyps, so that they can be removed before they turn into cancer. Screening tests can also find colorectal cancer early, when treatment works best.  Ely Keys Speak with your physician about when you should begin screening and how often you should be tested. Additional Information    If you have questions, please call [phone]. Remember, [email or web address] is NOT to be used for urgent needs. For medical emergencies, dial 911. Educational references and/or instructions provided during this visit included:    See attached. APPOINTMENTS:    Per MD Instruction. Discharge information has been reviewed with the patient. The patient verbalized understanding.

## 2021-07-30 NOTE — ANESTHESIA POSTPROCEDURE EVALUATION
Procedure(s):  COLONOSCOPY w polypectomies. MAC    Anesthesia Post Evaluation      Multimodal analgesia: multimodal analgesia used between 6 hours prior to anesthesia start to PACU discharge  Patient location during evaluation: bedside  Patient participation: complete - patient participated  Level of consciousness: awake  Pain management: adequate  Airway patency: patent  Anesthetic complications: no  Cardiovascular status: stable  Respiratory status: acceptable  Hydration status: acceptable  Post anesthesia nausea and vomiting:  controlled  Final Post Anesthesia Temperature Assessment:  Normothermia (36.0-37.5 degrees C)      INITIAL Post-op Vital signs:   Vitals Value Taken Time   /70 07/30/21 1030   Temp 36.4 °C (97.5 °F) 07/30/21 1020   Pulse 68 07/30/21 1034   Resp 17 07/30/21 1034   SpO2 100 % 07/30/21 1034   Vitals shown include unvalidated device data.

## 2021-07-30 NOTE — PROCEDURES
[email or web address]  [phone]        Brief Procedure Note    Monico Mack  1965  [id number]    Date of Procedure: 7/30/2021    Preoperative diagnosis:   Colon cancer Screening:   V76.51 - Z12.11    Postoperative diagnosis: Transverse polyp, diverticulosis, sigmoid polyp, rectal polyps x2 interenal hemorrhoids    Description of Findings: same    Sedation/Anesthesia: Monitored Anesthesia Care; See Anesthesia Note    Procedure: Procedure(s):  COLONOSCOPY w polypectomies    :  Dr. Taco Garrett MD    Assistant(s): Endoscopy Technician-1: Cele Vega  Endoscopy RN-1: Carmela Caldwell RN    EBL:None    Specimens:   ID Type Source Tests Collected by Time Destination   1 : Transverse polyp Preservative Colon  Milly Leggett MD 7/30/2021 1004 Pathology   2 : sigmoid polyp Preservative Colon  Milly Leggett MD 7/30/2021 1011 Pathology   3 : rectal polyps Preservative Colon  Milly Leggett MD 7/30/2021 1011 Pathology       Findings: See printed and scanned procedure note    Complications: None    Tissue Implant Device: None    Dr. Taco Garrett MD  7/30/2021  10:16 AM    Taco Garrett MD  [facility] [address] [phone]  [email or web address]

## 2021-08-03 ENCOUNTER — TRANSCRIBE ORDER (OUTPATIENT)
Dept: SCHEDULING | Age: 56
End: 2021-08-03

## 2021-08-03 DIAGNOSIS — Z12.31 VISIT FOR SCREENING MAMMOGRAM: Primary | ICD-10-CM

## 2021-08-04 ENCOUNTER — HOSPITAL ENCOUNTER (OUTPATIENT)
Dept: PHYSICAL THERAPY | Age: 56
Discharge: HOME | End: 2021-08-04
Attending: ORTHOPAEDIC SURGERY
Payer: COMMERCIAL

## 2021-08-04 PROCEDURE — 97110 THERAPEUTIC EXERCISES: CPT

## 2021-08-04 PROCEDURE — 97014 ELECTRIC STIMULATION THERAPY: CPT

## 2021-08-04 NOTE — PROGRESS NOTES
PT DAILY TREATMENT NOTE     Patient Name: Jarvis Mata  Date:2021  : 1965  [x]  Patient  Verified  Payor: Sury Hsieh / Plan: Dina Aguirre PPO / Product Type: PPO /    In time: 1:35  Out time: 2:25  Total Treatment Time (min): 50  Visit #: 1 of 10    Treatment Area: Pain in left shoulder [M25.512]    SUBJECTIVE  Pain Level (0-10 scale): 7/10  Any medication changes, allergies to medications, adverse drug reactions, diagnosis change, or new procedure performed?: [x] No    [] Yes (see summary sheet for update)  Subjective functional status/changes:   [] No changes reported  Patient reports increased left shoulder pain today secondary to the rainy weather. Endorses continued compliance with HEP.       OBJECTIVE    Modality rationale: decrease inflammation and decrease pain to improve the patients ability to perform ADLs without being limited by left shoulder pain    Min Type Additional Details   15 [x] Estim:  [x]Unatt       [x]IFC  []Premod                        []Other:  [x]w/ice   []w/heat  Position: reclined  Location: left shoulder    [] Estim: []Att    []TENS instruct  []NMES                    []Other:  []w/US   []w/ice   []w/heat  Position:  Location:    []  Traction: [] Cervical       []Lumbar                       [] Prone          []Supine                       []Intermittent   []Continuous Lbs:  [] before manual  [] after manual    []  Ultrasound: []Continuous   [] Pulsed                           []1MHz   []3MHz W/cm2:  Location:    []  Iontophoresis with dexamethasone         Location: [] Take home patch   [] In clinic    []  Ice     []  heat  []  Ice massage  []  Laser   []  Anodyne Position:  Location:    []  Laser with stim  []  Other:  Position:  Location:    []  Vasopneumatic Device    []  Right     []  Left  Pre-treatment girth:  Post-treatment girth:  Measured at (location):  Pressure:       [] lo [] med [] hi   Temperature: [] lo [] med [] hi   [x] Skin assessment post-treatment:  [x]intact []redness- no adverse reaction    []redness  adverse reaction:       35 min Therapeutic Exercise:  [x] See flow sheet :   Rationale: increase ROM and increase strength  to improve the patients ability to perform ADLs that involve reaching overhead and lifting objects without limitation           With   [x] TE   [] TA   [] neuro   [] other: Patient Education: [x] Review HEP    [] Progressed/Changed HEP based on:   [] positioning   [] body mechanics   [] transfers   [] heat/ice application    [] other:      Other Objective/Functional Measures: Increased pain (to 8/10) with all AROM and AAROM exercises --> therapist therefore held strengthening exercises today    Patient performed wand exercises in supine    Pain Level (0-10 scale) post treatment: 4/10    ASSESSMENT/Changes in Function: Patient with limited participation throughout session secondary to increased left shoulder pain today. Patient attributes the increase in pain to the weather. Therapist therefore had patient perform AROM and AAROM exercises and held the newer strengthening exercises. Patient reported pain increase to 8/10 with AROM and AAROM interventions, but reported symptom improvement following e-stim. Will continue to progress as able. Patient will continue to benefit from skilled PT services to modify and progress therapeutic interventions, address functional mobility deficits, address ROM deficits, address strength deficits, analyze and address soft tissue restrictions, analyze and cue movement patterns, analyze and modify body mechanics/ergonomics, assess and modify postural abnormalities and instruct in home and community integration to attain remaining goals. [x]  See Plan of Care  []  See progress note/recertification  []  See Discharge Summary         Progress towards goals / Updated goals:  1. Pt will increase FOTO score to 64 points to improve ability to perform ADLs.    PN: reassess at upcoming sessions   Current: will assess at next progress note - progressing  2. Pt will increase AROM left shoulder flex to at least 120 degs, ABD to 100 degs, ER to 65 degs, and IR to 70 degs to improve ability to tolerate dressing tasks. PN: left shoulder AROM: flex 85 degs with pain, ABD 70 degs with pain, ER 55 degs, IR 60 degs    Current: will formally assess during next visit when patient is feeling better - progressing  3. Pt will be able to reach into a shoulder height shelf with the left UE with mild to no pain or difficulty to improve independence in the home. PN: reports she is unable to reach into a shelf with the left UE.     Current: continues to report quite a bit of difficulty reaching overhead - progressing    PLAN  [x]  Upgrade activities as tolerated     [x]  Continue plan of care  [x]  Update interventions per flow sheet       []  Discharge due to:_  []  Other:_      Maikel Llanes, PT 8/4/2021  1:35 PM    Future Appointments   Date Time Provider Deisy Palomo   8/6/2021 12:00 PM Hollice Collet, PT [facility]   8/9/2021  9:45 AM Aurora Musa PTA [facility]   8/12/2021  8:30 AM [facility] YAMILET RM 4 3D [facility]   8/12/2021 12:00 PM Hollice Collet, PT [facility]   8/16/2021 10:00 AM Daya Erwin MD [facility] AMB   8/16/2021  1:30 PM Hollice Collet, PT [facility]   8/19/2021  1:30 PM Agustina John PT [facility]   8/23/2021 12:00 PM Hollice Collet, PT [facility]   8/24/2021 10:00 AM Alexia Colón MD [facility] BS AMB   8/26/2021  1:30 PM Don Rae PTA [facility]   8/30/2021  1:30 PM Aurora Velasquez Mt, PT [facility]

## 2021-08-06 ENCOUNTER — APPOINTMENT (OUTPATIENT)
Dept: PHYSICAL THERAPY | Age: 56
End: 2021-08-06
Attending: ORTHOPAEDIC SURGERY
Payer: COMMERCIAL

## 2021-08-09 ENCOUNTER — APPOINTMENT (OUTPATIENT)
Dept: PHYSICAL THERAPY | Age: 56
End: 2021-08-09
Attending: ORTHOPAEDIC SURGERY
Payer: COMMERCIAL

## 2021-08-12 ENCOUNTER — HOSPITAL ENCOUNTER (OUTPATIENT)
Dept: MAMMOGRAPHY | Age: 56
Discharge: HOME | End: 2021-08-12
Attending: FAMILY MEDICINE
Payer: COMMERCIAL

## 2021-08-12 ENCOUNTER — HOSPITAL ENCOUNTER (OUTPATIENT)
Dept: PHYSICAL THERAPY | Age: 56
Discharge: HOME | End: 2021-08-12
Attending: ORTHOPAEDIC SURGERY
Payer: COMMERCIAL

## 2021-08-12 DIAGNOSIS — Z12.31 VISIT FOR SCREENING MAMMOGRAM: ICD-10-CM

## 2021-08-12 PROCEDURE — 77067 SCR MAMMO BI INCL CAD: CPT

## 2021-08-12 PROCEDURE — 97110 THERAPEUTIC EXERCISES: CPT

## 2021-08-12 PROCEDURE — 97140 MANUAL THERAPY 1/> REGIONS: CPT

## 2021-08-12 NOTE — PROGRESS NOTES
PT DAILY TREATMENT NOTE     Patient Name: Jason Gr  Date:2021  : 1965  [x]  Patient  Verified  Payor: [id number] RODRIGO Monique / Plan: Aubrie Bianchi PPO / Product Type: PPO /    In time: 1:31  Out time:2:20  Total Treatment Time (min): 49  Visit #: 2 of 10    Medicare/BCBS Only   Total Timed Codes (min):   1:1 Treatment Time:         Treatment Area: Pain in left shoulder [M25.512]    SUBJECTIVE  Pain Level (0-10 scale): 7  Any medication changes, allergies to medications, adverse drug reactions, diagnosis change, or new procedure performed?: [x] No    [] Yes (see summary sheet for update)  Subjective functional status/changes:   [] No changes reported  \"I took a pain pill before I came. \"    OBJECTIVE    Modality rationale: decrease edema, decrease inflammation, decrease pain, increase tissue extensibility and increase muscle contraction/control to improve the patients ability to perform ADL    Min Type Additional Details    [] Estim:  []Unatt       []IFC  []Premod                        []Other:  []w/ice   []w/heat  Position:  Location:    [] Estim: []Att    []TENS instruct  []NMES                    []Other:  []w/US   []w/ice   []w/heat  Position:  Location:    []  Traction: [] Cervical       []Lumbar                       [] Prone          []Supine                       []Intermittent   []Continuous Lbs:  [] before manual  [] after manual    []  Ultrasound: []Continuous   [] Pulsed                           []1MHz   []3MHz W/cm2:  Location:    []  Iontophoresis with dexamethasone         Location: [] Take home patch   [] In clinic   10 [x]  Ice     []  heat  []  Ice massage  []  Laser   []  Anodyne Position:long sit  Location:left shoulder    []  Laser with stim  []  Other:  Position:  Location:    []  Vasopneumatic Device    []  Right     []  Left  Pre-treatment girth:  Post-treatment girth:  Measured at (location):  Pressure:       [] lo [] med [] hi   Temperature: [] lo [] med [] hi   [x] Skin assessment post-treatment:  [x]intact []redness- no adverse reaction    []redness  adverse reaction:      min []Eval                  []Re-Eval       31 min Therapeutic Exercise:  [x] See flow sheet :   Rationale: increase ROM, increase strength and improve coordination to improve the patients ability to perform ADL      min Therapeutic Activity:  [x]  See flow sheet :   Rationale: increase ROM, increase strength and improve coordination  to improve the patients ability to perform ADL       min Neuromuscular Re-education:  []  See flow sheet :   Rationale: increase ROM, increase strength and improve coordination  to improve the patients ability to perform ADL     8 min Manual Therapy: [address] [facility] mob 3 with distraction all plane semi reclined   The manual therapy interventions were performed at a separate and distinct time from the therapeutic activities interventions. Rationale: decrease pain, increase ROM, increase tissue extensibility, decrease edema , decrease trigger points and increase postural awareness to perform ADL      min Gait Training:  ___ feet with ___ device on level surfaces with ___ level of assist   Rationale: With   [x] TE   [] TA   [] neuro   [] other: Patient Education: [x] Review HEP    [] Progressed/Changed HEP based on:   [] positioning   [] body mechanics   [] transfers   [] heat/ice application    [] other:      Other Objective/Functional Measures:  Wall climb #31    Pain Level (0-10 scale) post treatment: 4-5    ASSESSMENT/Changes in Function: Pt experienced light headed following UBE. Once pt sat dizziness went away. Pt presents with good ROM during manual. Pt reports difficulty with shoulder ABD. Overall pt responded fairly well to each there ex.     Patient will continue to benefit from skilled PT services to address functional mobility deficits, address ROM deficits, address strength deficits, analyze and address soft tissue restrictions, analyze and cue movement patterns, analyze and modify body mechanics/ergonomics, assess and modify postural abnormalities and instruct in home and community integration to attain remaining goals. []  See Plan of Care  [x]  See progress note/recertification  []  See Discharge Summary         Progress towards goals / Updated goals:  1. Pt will increase FOTO score to 64 points to improve ability to perform ADLs. PN: reassess at upcoming sessions              Current: will assess at next progress note - progressing  2.  Pt will increase AROM left shoulder flex to at least 120 degs, ABD to 100 degs, ER to 65 degs, and IR to 70 degs to improve ability to tolerate dressing tasks. PN: left shoulder AROM: flex 85 degs with pain, ABD 70 degs with pain, ER 55 degs, IR 60 degs               Current: will formally assess during next visit when patient is feeling better - progressing  3. Pt will be able to reach into a shoulder height shelf with the left UE with mild to no pain or difficulty to improve independence in the home.                PN: reports she is unable to reach into a shelf with the left UE.               Current: continues to report quite a bit of difficulty reaching overhead - progressing       PLAN  [x]  Upgrade activities as tolerated     []  Continue plan of care  []  Update interventions per flow sheet       []  Discharge due to:_  []  Other:_      Theo Santacruz PTA 8/12/2021  1:35 PM    Future Appointments   Date Time Provider Deisy Palomo   8/16/2021 10:00 AM Yobany Loza MD [facility] BS AMB   8/16/2021  1:30 PM Luzma Sinks, PT [facility]   8/19/2021  1:30 PM Gloria Moctezuma, PT [facility]   8/23/2021 12:00 PM Luzma Sinks, PT [facility]   8/24/2021 10:00 AM Devon Dozier MD [facility] BS AMB   8/26/2021  1:30 PM Chick Ohs, PTA [facility]   8/30/2021  1:30 PM Luzma Sinks, PT [facility]   12/3/2021 11:00 AM Merline Barrios, DO [facility] BS AMB

## 2021-08-16 ENCOUNTER — HOSPITAL ENCOUNTER (OUTPATIENT)
Dept: PHYSICAL THERAPY | Age: 56
Discharge: HOME | End: 2021-08-16
Attending: ORTHOPAEDIC SURGERY
Payer: COMMERCIAL

## 2021-08-16 ENCOUNTER — OFFICE VISIT (OUTPATIENT)
Dept: CARDIOLOGY CLINIC | Age: 56
End: 2021-08-16
Payer: COMMERCIAL

## 2021-08-16 VITALS
WEIGHT: 293 LBS | DIASTOLIC BLOOD PRESSURE: 82 MMHG | HEART RATE: 81 BPM | OXYGEN SATURATION: 98 % | BODY MASS INDEX: 45.99 KG/M2 | SYSTOLIC BLOOD PRESSURE: 124 MMHG | HEIGHT: 67 IN

## 2021-08-16 DIAGNOSIS — R60.0 LEG EDEMA: Primary | ICD-10-CM

## 2021-08-16 PROCEDURE — 97140 MANUAL THERAPY 1/> REGIONS: CPT

## 2021-08-16 PROCEDURE — 97110 THERAPEUTIC EXERCISES: CPT

## 2021-08-16 PROCEDURE — 99213 OFFICE O/P EST LOW 20 MIN: CPT | Performed by: INTERNAL MEDICINE

## 2021-08-16 NOTE — PROGRESS NOTES
[facility]    Ms. Niall Sheriff is 64 y. o.female with morbid obesity, lymphedema, degenerative joint disease    Patient is here today for cardiac evaluation. She denies any prior history of MI or CHF. Since last visit, she denies any hospitalization. With working on her diet, she has been able to reduce her overall edema. She denies any chest pain or chest tightness. Currently she has stopped taking Eliquis under supervision of primary care provider because of episode of bleeding. Denies any nausea, vomiting, abdominal pain, fever, chills, sputum production. No hematuria or other bleeding complaints    Past Medical History:   Diagnosis Date    Adverse effect of anesthesia     \"Epidural\" allergy    Anemia     anemia    Arthritis     lft knee     Asthma     Lymphedema     R leg    Obesity     Obesity     Pulmonary emboli ([address] 05/2021    DVTs lower extremities, denies PE       Review of Systems:  Cardiac symptoms as noted above in HPI. All others negative. Denies fatigue, malaise, skin rash, joint pain, blurring vision, photophobia, neck pain, hemoptysis, chronic cough, nausea, vomiting, hematuria, burning micturition, BRBPR, chronic headaches. Current Outpatient Medications   Medication Sig    albuterol (PROVENTIL HFA, VENTOLIN HFA, PROAIR HFA) 90 mcg/actuation inhaler Take  by inhalation as needed for Wheezing.  cyclobenzaprine (FLEXERIL) 10 mg tablet TAKE 1 TABLET BY MOUTH NIGHTLY. INDICATIONS: MUSCLE SPASM    multivitamin (ONE A DAY) tablet Take 1 Tab by mouth daily.  cyanocobalamin, vitamin B-12, (Vitamin B-12) 5,000 mcg subl 5,000 mcg by SubLINGual route daily. Dissolvable    acetaminophen (Tylenol Extra Strength) 500 mg tablet Take  by mouth every six (6) hours as needed for Pain.     apixaban (Eliquis DVT-PE Treat 30D Start) 5 mg (74 tabs) starter pack Take 10 mg (two 5 mg tablets) by mouth twice a day for 7 days Followed by 5 mg (one 5 mg tablet) by mouth twice a day (Patient not taking: Reported on 8/16/2021)     No current facility-administered medications for this visit. Past Surgical History:   Procedure Laterality Date    COLONOSCOPY N/A 7/30/2021    COLONOSCOPY w polypectomies performed by Chauncy Barthel, MD at [facility] ENDOSCOPY    HX [id number] Left 04/12/2021    [place] ANESTH,SURGERY OF SHOULDER Left 04/12/2021       Allergies and Sensitivities:  Allergies   Allergen Reactions    Other Medication Itching     Patient states \"Epidural\"     Tetanus And Diphtheria Toxoids Hives and Nausea and Vomiting       Family History:  Family History   Problem Relation Age of Onset    Cancer Mother     Diabetes Mother     Hypertension Mother     Cancer Father     No Known Problems Brother        Social History:  Social History     Tobacco Use    Smoking status: Never Smoker    Smokeless tobacco: Never Used   Vaping Use    Vaping Use: Never used   Substance Use Topics    Alcohol use: No    Drug use: Never     She  reports that she has never smoked. She has never used smokeless tobacco.  She  reports no history of alcohol use. Physical Exam:  BP Readings from Last 3 Encounters:   08/16/21 124/82   07/30/21 127/84   06/22/21 124/82         Pulse Readings from Last 3 Encounters:   08/16/21 81   07/30/21 70   07/16/21 (!) 107          Wt Readings from Last 3 Encounters:   08/16/21 (!) 188.7 kg (416 lb)   07/30/21 (!) 187.2 kg (412 lb 9.6 oz)   07/16/21 (!) 193.2 kg (426 lb)       Constitutional: Oriented to person, place, and time. HENT: Head: Normocephalic and atraumatic. Eyes: Conjunctivae and extraocular motions are normal.   Neck: No JVD present. Carotid bruit is not appreciated. Cardiovascular: Regular rhythm. No murmur, gallop or rubs appreciated  Lung: Breath sounds normal. No respiratory distress. No ronchi or rales appreciated  Abdominal: No tenderness.  No rebound and no guarding. Musculoskeletal: There is bilateral nonpitting edema,  No cynosis    LABS:   @  Lab Results   Component Value Date/Time    WBC 3.9 (L) 05/12/2021 04:38 PM    HGB 11.4 (L) 05/12/2021 04:38 PM    HCT 36.1 05/12/2021 04:38 PM    PLATELET 706 53/59/3428 04:38 PM    MCV 81.1 05/12/2021 04:38 PM     Lab Results   Component Value Date/Time    Sodium 140 05/12/2021 04:38 PM    Potassium 3.7 05/12/2021 04:38 PM    Chloride 105 05/12/2021 04:38 PM    CO2 32 05/12/2021 04:38 PM    Glucose 94 05/12/2021 04:38 PM    BUN 14 05/12/2021 04:38 PM    Creatinine 0.68 05/12/2021 04:38 PM     No flowsheet data found. Lab Results   Component Value Date/Time    ALT (SGPT) 16 05/12/2021 04:38 PM     No results found for: HBA1C, LWK5XVPH, LZJ2LRDW, AVO4SOIR  No results found for: TSH, TSH2, TSH3, TSHP, TSHEXT, TSHEXT    EKG    ECHO (2020)  Left Ventricle Normal cavity size, wall thickness and systolic function (ejection fraction normal). Wall motion: normal. The estimated EF is 55 - 60%. Visually measured ejection fraction. There is age-appropriate left ventricular diastolic function. ECHO ADULT COMPLETE 06/22/2021    Left Ventricle Normal cavity size and systolic function (ejection fraction normal). Upper normal wall thickness. Wall motion: normal. The estimated EF is 55 - 60%. Visually measured ejection fraction. There is age-appropriate left ventricular diastolic function. Wall Scoring The left ventricular wall motion is normal.            Left Atrium Left Atrium volume index is 32 mL/m2. Right Ventricle Normal cavity size and global systolic function. Right Atrium Normal cavity size. Aortic Valve No stenosis and no regurgitation. Probably trileaflet aortic valve. Mitral Valve Normal valve structure, no stenosis and no regurgitation. Tricuspid Valve Normal valve structure and no stenosis. Mild regurgitation. Pulmonic Valve Pulmonic valve normal doppler findings.  Normal valve structure, no stenosis and no regurgitation. Aorta Normal aortic root. Pulmonary Artery Pulmonary arterial systolic pressure (PASP) is 34 mmHg. Pulmonary hypertension not suggested by Doppler findings. STRESS TEST (EST, PHARM, NUC, ECHO etc)    CATHETERIZATION    IMPRESSION & PLAN:  Ms. Kelly Gonzalez is 59-year-old female    Edema:  Patient has lower extremity edema on and off for long time. She has take Lasix in the past.  She had echocardiogram in 2020 which showed mild to moderate pulmonary hypertension but normal ejection fraction and no major valvular heart disease  Echo in 06/2021, normal EF. No major valvular heart disease PAPS. 34  No significant edema on exam.  She is working on her dietary modification. She has reduce salt intake and she believes that edema has resolved. Obesity:  Importance of diet and exercise was discussed with patient. Patient is considering gastric bypass surgery as she has exhausted all nonsurgical intervention herself. This plan was discussed with patient who is in agreement. Thank you for allowing me to participate in patient care. Please feel free to call me if you have any question or concern. Samantha Arreola MD  Please note: This document has been produced using voice recognition software. Unrecognized errors in transcription may be present.

## 2021-08-16 NOTE — PROGRESS NOTES
PT DAILY TREATMENT NOTE     Patient Name: Jyoti Skaggs  Date:2021  : 1965  [x]  Patient  Verified  Payor: Theo Gustafson / Plan: Jude Mcgowan PPO / Product Type: PPO /    In time: 1:34  Out time: 2:31  Total Treatment Time (min): 62  Visit #: 3 of 10    Treatment Area: Pain in left shoulder [M25.512]    SUBJECTIVE  Pain Level (0-10 scale): 4/10  Any medication changes, allergies to medications, adverse drug reactions, diagnosis change, or new procedure performed?: [x] No    [] Yes (see summary sheet for update)  Subjective functional status/changes:   [] No changes reported  Patient reports that her shoulder is feeling better today, down from a 7/10 to a 4/10 pain rating    OBJECTIVE    Modality rationale: decrease edema, decrease inflammation and decrease pain to improve the patients ability to perform ADLs without being limited by shoulder pain, and to lessen muscle soreness following PT session    Min Type Additional Details    [] Estim:  []Unatt       []IFC  []Premod                        []Other:  []w/ice   []w/heat  Position:  Location:    [] Estim: []Att    []TENS instruct  []NMES                    []Other:  []w/US   []w/ice   []w/heat  Position:  Location:    []  Traction: [] Cervical       []Lumbar                       [] Prone          []Supine                       []Intermittent   []Continuous Lbs:  [] before manual  [] after manual    []  Ultrasound: []Continuous   [] Pulsed                           []1MHz   []3MHz W/cm2:  Location:    []  Iontophoresis with dexamethasone         Location: [] Take home patch   [] In clinic   10 [x]  Ice post    []  heat  []  Ice massage  []  Laser   []  Anodyne Position: reclined  Location: left shoulder    []  Laser with stim  []  Other:  Position:  Location:    []  Vasopneumatic Device    []  Right     []  Left  Pre-treatment girth:  Post-treatment girth:  Measured at (location):  Pressure:       [] lo [] med [] hi   Temperature: [] lo [] med [] hi   [] Skin assessment post-treatment:  []intact []redness- no adverse reaction    []redness  adverse reaction:     39 min Therapeutic Exercise:  [x] See flow sheet :   Rationale: increase ROM and increase strength to improve the patients ability to perform ADLs that require reaching and lifting without limitation     8 min Manual Therapy:  PROM stretching into flexion and abduction with distraction   Patient position: supine   The manual therapy interventions were performed at a separate and distinct time from the therapeutic activities interventions. Rationale: decrease pain, increase ROM and increase tissue extensibility to improve patient's ability to perform daily activities without being limited by shoulder pain or stiffness          With   [x] TE   [] TA   [] neuro   [] other: Patient Education: [x] Review HEP    [] Progressed/Changed HEP based on:   [] positioning   [] body mechanics   [] transfers   [] heat/ice application    [] other:      Other Objective/Functional Measures: Progressed sidelying ER and abduction as well as supine SA punches to include 2# dumbbells. Added ball stabilization at wall today. Pain Level (0-10 scale) post treatment: 6/10 following therex and manual therapy interventions     ASSESSMENT/Changes in Function: Patient with reports of decreased pain at rest today. Patient tolerated exercise progressions well, however, reported increase in pain from 4/10 to 6/10 following therex and manual interventions. Patient is progressing well towards all goals and will continue to benefit from PT to address strength, ROM, and functional mobility impairments.      Patient will continue to benefit from skilled PT services to modify and progress therapeutic interventions, address functional mobility deficits, address ROM deficits, address strength deficits, analyze and address soft tissue restrictions, analyze and cue movement patterns, analyze and modify body mechanics/ergonomics, assess and modify postural abnormalities and instruct in home and community integration to attain remaining goals. []  See Plan of Care  [x]  See progress note/recertification  []  See Discharge Summary         Progress towards goals / Updated goals:  1. Pt will increase FOTO score to 64 points to improve ability to perform ADLs.             PN: reassess at upcoming sessions              Current: will assess at next progress note - progressing  2.  Pt will increase AROM left shoulder flex to at least 120 degs, ABD to 100 degs, ER to 65 degs, and IR to 70 degs to improve ability to tolerate dressing tasks.               PN: left shoulder AROM: flex 85 degs with pain, ABD 70 degs with pain, ER 55 degs, IR 60 degs               Current: will assess at next visit - progressing  3.  Pt will be able to reach into a shoulder height shelf with the left UE with mild to no pain or difficulty to improve independence in the home.               PN: reports she is unable to reach into a shelf with the left UE.               Current: continues to report quite a bit of difficulty reaching overhead - progressing    PLAN  [x]  Upgrade activities as tolerated     []  Continue plan of care  []  Update interventions per flow sheet       []  Discharge due to:_  []  Other:_      Ileana Siegel, PT 8/16/2021  1:36 PM    Future Appointments   Date Time Provider Deisy Stacia   8/19/2021  1:30 PM En Leslie, PT [facility]   8/23/2021 12:00 PM Perla Ybarra, PT [facility]   8/24/2021 10:00 AM Charito Aguilar MD [facility] BS AMB   8/26/2021  1:30 PM Humberto Berry PTA [facility]   8/30/2021  1:30 PM Perla Ybarra, PT [facility]   8/22/2022  9:00 AM Trey Tapia MD [facility] BS AMB

## 2021-08-19 ENCOUNTER — HOSPITAL ENCOUNTER (OUTPATIENT)
Dept: PHYSICAL THERAPY | Age: 56
Discharge: HOME | End: 2021-08-19
Attending: ORTHOPAEDIC SURGERY
Payer: COMMERCIAL

## 2021-08-19 PROCEDURE — 97140 MANUAL THERAPY 1/> REGIONS: CPT | Performed by: PHYSICAL THERAPIST

## 2021-08-19 PROCEDURE — 97014 ELECTRIC STIMULATION THERAPY: CPT | Performed by: PHYSICAL THERAPIST

## 2021-08-19 PROCEDURE — 97110 THERAPEUTIC EXERCISES: CPT | Performed by: PHYSICAL THERAPIST

## 2021-08-19 PROCEDURE — 97112 NEUROMUSCULAR REEDUCATION: CPT | Performed by: PHYSICAL THERAPIST

## 2021-08-19 PROCEDURE — 97530 THERAPEUTIC ACTIVITIES: CPT | Performed by: PHYSICAL THERAPIST

## 2021-08-19 NOTE — PROGRESS NOTES
PT DAILY TREATMENT NOTE     Patient Name: Nurys Ramirez  Date:2021  : 1965  [x]  Patient  Verified  Payor: Sandra Alberto / Plan: Pia Glez PPO / Product Type: PPO /    In time:1:33P  Out time: 2:35P  Total Treatment Time (min): 62min  Visit #: 4 of 10    Treatment Area: Pain in left shoulder [M25.512]    SUBJECTIVE  Pain Level (0-10 scale): 4/10  Any medication changes, allergies to medications, adverse drug reactions, diagnosis change, or new procedure performed?: [x] No    [] Yes (see summary sheet for update)  Subjective functional status/changes:   [] No changes reported  Patient states she is still not able to reach into her cabinets but is able to don and doff her night cap for her hair at night. OBJECTIVE  Modality rationale: decrease edema, decrease inflammation and decrease pain to improve the patients ability to perform ADLs without being limited by shoulder pain, and to lessen muscle soreness following PT session    Min Type Additional Details      []? Estim:  []? Unatt       []? IFC  []? Premod                        []?Other:  []?w/ice   []?w/heat  Position:  Location:      []? Estim: []? Att    []? TENS instruct  []? NMES                    []?Other:  []?w/US   []?w/ice   []?w/heat  Position:  Location:      []? Traction: []? Cervical       []? Lumbar                       []? Prone          []? Supine                       []?Intermittent   []? Continuous Lbs:  []? before manual  []? after manual      []? Ultrasound: []? Continuous   []? Pulsed                           []? 1MHz   []? 3MHz W/cm2:  Location:      []? Iontophoresis with dexamethasone         Location: []? Take home patch   []? In clinic    15 [x]? Ice post    []?  heat  []? Ice massage  []? Laser   []? Anodyne Position: reclined  Location: left shoulder      []? Laser with stim  []? Other:  Position:  Location:      []? Vasopneumatic Device    []? Right     []?   Left  Pre-treatment girth:  Post-treatment girth: Measured at (location):  Pressure:       []? lo []? med []? hi   Temperature: []? lo []? med []? hi    []? Skin assessment post-treatment:  []?intact []? redness- no adverse reaction    []? redness  adverse reaction:      13 min Therapeutic Exercise:  [x]? See flow sheet :   Rationale: increase ROM and increase strength to improve the patients ability to increase A/ROM and decrease pain with movement.     12 min Therapeutic Activity:  [x]? See flow sheet :   Rationale: increase strength and improve coordination  to improve the patients ability to Tolerate basic ADLs and household-related tasks without pain. 10 min Neuromuscular Re-education:  [x]? See flow sheet :   Rationale: improve coordination, improve balance and increase proprioception  to improve the patients ability to the patients ability to perform functional tasks such as overhead reach when appropriat     12 min Manual Therapy:  Grade 1-2 mobs to Left [address] joint into A/P and inf/post directions. The manual therapy interventions were performed at a separate and distinct time from the therapeutic activities interventions. Rationale: increase ROM and increase tissue extensibility to improve return to functional lifting when appropriate. With   [x]? TE   [x]? TA   [x]? neuro   []? other: Patient Education: [x]? Review HEP    [x]? Progressed/Changed HEP based on:   [x]? positioning   []? body mechanics   []? transfers   []? heat/ice application    []? other:      Other Objective/Functional Measures: seated abduction ROM about 75 degrees, sidelying abduction to 150 degrees on the Left    Pain Level (0-10 scale) post treatment: 5/10    ASSESSMENT/Changes in Function: Patient is progressing slowly towards goals of increased activity tolerance and decreased pain. Today worked on sidelying scaption and horizontal abduction to facilitate strength with gravity eliminated.      Patient will continue to benefit from skilled PT services to modify and progress therapeutic interventions, address functional mobility deficits, address ROM deficits, address strength deficits, analyze and address soft tissue restrictions, analyze and cue movement patterns, analyze and modify body mechanics/ergonomics and assess and modify postural abnormalities to attain remaining goals. [x]  See Plan of Care  []  See progress note/recertification  []  See Discharge Summary         Progress towards goals / Updated goals:  1. Pt will increase FOTO score to 64 points to improve ability to perform ADLs.             PN: reassess at upcoming sessions              Current: will assess at next progress note - progressing  2.  Pt will increase AROM left shoulder flex to at least 120 degs, ABD to 100 degs, ER to 65 degs, and IR to 70 degs to improve ability to tolerate dressing tasks.               PN: left shoulder AROM: flex 85 degs with pain, ABD 70 degs with pain, ER 55 degs, IR 60 degs               Current: will assess at next visit - progressing  3.  Pt will be able to reach into a shoulder height shelf with the left UE with mild to no pain or difficulty to improve independence in the home.               PN: reports she is unable to reach into a shelf with the left UE.               Current: continues to report quite a bit of difficulty reaching overhead - progressing    PLAN  [x]  Upgrade activities as tolerated     []  Continue plan of care  []  Update interventions per flow sheet       []  Discharge due to:_  []  Other:_      Ciara Ball, PT 8/19/2021  1:37 PM    Future Appointments   Date Time Provider \[facility]\""   8/23/2021 12:00 PM [facility]   8/24/2021 10:00 AM Moises Coleman MD [facility] BS AMB   8/26/2021  1:30 PM Dorothy Banegas PTA [facility]   8/30/2021  1:30 PM Saú[facility]ci [facility]   8/22/2022  9:00 AM Fely Tirado MD [facility] BS AMB

## 2021-08-23 ENCOUNTER — TRANSCRIBE ORDER (OUTPATIENT)
Dept: SCHEDULING | Age: 56
End: 2021-08-23

## 2021-08-23 ENCOUNTER — HOSPITAL ENCOUNTER (OUTPATIENT)
Dept: PHYSICAL THERAPY | Age: 56
Discharge: HOME | End: 2021-08-23
Attending: ORTHOPAEDIC SURGERY
Payer: COMMERCIAL

## 2021-08-23 DIAGNOSIS — N63.20 LEFT BREAST LUMP: Primary | ICD-10-CM

## 2021-08-23 PROCEDURE — 97110 THERAPEUTIC EXERCISES: CPT

## 2021-08-23 PROCEDURE — 97140 MANUAL THERAPY 1/> REGIONS: CPT

## 2021-08-23 PROCEDURE — 97112 NEUROMUSCULAR REEDUCATION: CPT

## 2021-08-23 NOTE — PROGRESS NOTES
PT DAILY TREATMENT NOTE     Patient Name: Blayne Tran  Date:2021  : 1965  [x]  Patient  Verified  Payor: Etienne Ahumada / Plan: Guillermo Murrieta PPO / Product Type: PPO /    In time: 2:17  Out time: 3:10  Total Treatment Time (min): 53  Visit #: 5 of 10    Treatment Area: Pain in left shoulder [M25.512]    SUBJECTIVE  Pain Level (0-10 scale): 3/10  Any medication changes, allergies to medications, adverse drug reactions, diagnosis change, or new procedure performed?: [x] No    [] Yes (see summary sheet for update)  Subjective functional status/changes:   [] No changes reported  Patient reports slightly less pain at rest today. Endorses continued compliance with HEP.      OBJECTIVE    Modality rationale: decrease edema, decrease inflammation and decrease pain to improve the patients ability to perform ADLs without being limited by left shoulder pain    Min Type Additional Details    [] Estim:  []Unatt       []IFC  []Premod                        []Other:  []w/ice   []w/heat  Position:  Location:    [] Estim: []Att    []TENS instruct  []NMES                    []Other:  []w/US   []w/ice   []w/heat  Position:  Location:    []  Traction: [] Cervical       []Lumbar                       [] Prone          []Supine                       []Intermittent   []Continuous Lbs:  [] before manual  [] after manual    []  Ultrasound: []Continuous   [] Pulsed                           []1MHz   []3MHz W/cm2:  Location:    []  Iontophoresis with dexamethasone         Location: [] Take home patch   [] In clinic   10 [x]  Ice post    []  heat  []  Ice massage  []  Laser   []  Anodyne Position: reclined  Location: left shoulder    []  Laser with stim  []  Other:  Position:  Location:    []  Vasopneumatic Device    []  Right     []  Left  Pre-treatment girth:  Post-treatment girth:  Measured at (location):  Pressure:       [] lo [] med [] hi   Temperature: [] lo [] med [] hi   [] Skin assessment post-treatment: []intact []redness- no adverse reaction    []redness  adverse reaction:     25 min Therapeutic Exercise:  [x] See flow sheet :   Rationale: increase ROM and increase strength to improve the patients ability to perform ADLs that involve reaching overhead and lifting without being limited by left shoulder pain or stiffness     10 min Neuromuscular Re-education:  [x]  See flow sheet :   Rationale: increase strength, improve coordination and enhance postural awareness  to improve the patients ability to perform ADLs without being limited by left shoulder pain or stiffness    8 min Manual Therapy:   PROM stretching into flexion and abduction with distraction   Patient position: supine   The manual therapy interventions were performed at a separate and distinct time from the therapeutic activities interventions. Rationale: decrease pain, increase ROM and increase tissue extensibility to improve patient's ability to perform daily activities without being limited by left shoulder stiffness          With   [x] TE   [] TA   [x] neuro   [] other: Patient Education: [x] Review HEP    [] Progressed/Changed HEP based on:   [] positioning   [] body mechanics   [] transfers   [] heat/ice application    [] other:      Other Objective/Functional Measures:  Increased UBE resistance to 2.0 today  Left shoulder AROM in sitting/supine: flexion 140 deg (with upper trap shrugging), abduction 115 deg (with upper trap shrugging), ER 75 deg, IR 80 deg     Pain Level (0-10 scale) post treatment: 3/10    ASSESSMENT/Changes in Function: Patient again participated well throughout session and demonstrates significant improvements in left shoulder AROM. Patient does however compensate by shrugging her shoulder during flexion and abduction. Patient is progressing well towards all goals and will continue to benefit from PT to address impairments.      Patient will continue to benefit from skilled PT services to modify and progress therapeutic interventions, address functional mobility deficits, address ROM deficits, address strength deficits, analyze and address soft tissue restrictions, analyze and cue movement patterns, analyze and modify body mechanics/ergonomics, assess and modify postural abnormalities and instruct in home and community integration to attain remaining goals. []  See Plan of Care  [x]  See progress note/recertification  []  See Discharge Summary         Progress towards goals / Updated goals:  1. Pt will increase FOTO score to 64 points to improve ability to perform ADLs.             PN: reassess at upcoming sessions              Current: will assess at next progress note - progressing  2.  Pt will increase AROM left shoulder flex to at least 120 degs, ABD to 100 degs, ER to 65 degs, and IR to 70 degs to improve ability to tolerate dressing tasks.               PN: left shoulder AROM: flex 85 degs with pain, ABD 70 degs with pain, ER 55 degs, IR 60 degs               Current: Left shoulder AROM in sitting/supine: flexion 140 deg (with upper trap shrugging), abduction 115 deg (with upper trap shrugging), ER 75 deg, IR 80 deg 8/23/21 - met  3.  Pt will be able to reach into a shoulder height shelf with the left UE with mild to no pain or difficulty to improve independence in the home.               PN: reports she is unable to reach into a shelf with the left UE.               Current: continues to report quite a bit of difficulty reaching overhead - progressing    PLAN  [x]  Upgrade activities as tolerated     [x]  Continue plan of care  [x]  Update interventions per flow sheet       []  Discharge due to:_  [x]  Other: Progress note at next visit      Neftaly Frias PT 8/23/2021  2:17 PM    Future Appointments   Date Time Provider Deisy Palomo   8/24/2021 10:00 AM Flo London MD [facility] BS AMB   8/26/2021  1:30 PM [facility]   8/30/2021  1:30 PM [facility]asquez[facility] 10/8/2021 10:30 AM Lisseth Birmingham MD [facility] BS AMB   8/22/2022  9:00 AM Carlyn Pagan MD [facility] BS AMB

## 2021-08-24 ENCOUNTER — OFFICE VISIT (OUTPATIENT)
Dept: ORTHOPEDIC SURGERY | Age: 56
End: 2021-08-24
Payer: COMMERCIAL

## 2021-08-24 ENCOUNTER — TELEPHONE (OUTPATIENT)
Dept: ORTHOPEDIC SURGERY | Age: 56
End: 2021-08-24

## 2021-08-24 VITALS
OXYGEN SATURATION: 96 % | HEIGHT: 67 IN | HEART RATE: 76 BPM | TEMPERATURE: 96.8 F | BODY MASS INDEX: 45.99 KG/M2 | WEIGHT: 293 LBS

## 2021-08-24 DIAGNOSIS — S46.102D INJURY OF TENDON OF LONG HEAD OF LEFT BICEPS, SUBSEQUENT ENCOUNTER: ICD-10-CM

## 2021-08-24 DIAGNOSIS — M75.122 NONTRAUMATIC COMPLETE TEAR OF LEFT ROTATOR CUFF: Primary | ICD-10-CM

## 2021-08-24 PROCEDURE — 99213 OFFICE O/P EST LOW 20 MIN: CPT | Performed by: ORTHOPAEDIC SURGERY

## 2021-08-24 NOTE — TELEPHONE ENCOUNTER
Spoke with patient and informed patient that her [address] forms are faxed and ready for pickup. Patient verbalized understanding.

## 2021-08-24 NOTE — PROGRESS NOTES
Patient: Naomi Petty                MRN: [id number]       SSN: [id number]  YOB: 1965        AGE: 64 y.o. SEX: female  Body mass index is 65.62 kg/m². PCP: Edgar Mccracken MD  08/24/21    Chief Complaint: Left shoulder follow up    Pain 3/10    HPI: Naomi Petty is a 64 y.o. female patient who returns to the office today for her left shoulder. She is now 4-1/2 months out from her left shoulder arthroscopic rotator cuff repair. Her pain and range of motion are improving. She is still in physical therapy. Past Medical History:   Diagnosis Date    Adverse effect of anesthesia     \"Epidural\" allergy    Anemia     anemia    Arthritis     lft knee     Asthma     Lymphedema     R leg    Obesity     Obesity     Pulmonary emboli ([address] 05/2021    DVTs lower extremities, denies PE       Family History   Problem Relation Age of Onset    Cancer Mother     Diabetes Mother     Hypertension Mother     Cancer Father     No Known Problems Brother        Current Outpatient Medications   Medication Sig Dispense Refill    albuterol (PROVENTIL HFA, VENTOLIN HFA, PROAIR HFA) 90 mcg/actuation inhaler Take  by inhalation as needed for Wheezing.  multivitamin (ONE A DAY) tablet Take 1 Tab by mouth daily.  cyanocobalamin, vitamin B-12, (Vitamin B-12) 5,000 mcg subl 5,000 mcg by SubLINGual route daily. Dissolvable      acetaminophen (Tylenol Extra Strength) 500 mg tablet Take  by mouth every six (6) hours as needed for Pain.  cyclobenzaprine (FLEXERIL) 10 mg tablet TAKE 1 TABLET BY MOUTH NIGHTLY.  INDICATIONS: MUSCLE SPASM (Patient not taking: Reported on 8/24/2021) 30 Tablet 0    apixaban (Eliquis DVT-PE Treat 30D Start) 5 mg (74 tabs) starter pack Take 10 mg (two 5 mg tablets) by mouth twice a day for 7 days   Followed by 5 mg (one 5 mg tablet) by mouth twice a day (Patient not taking: Reported on 8/16/2021) 1 Dose Pack 0       Allergies Allergen Reactions    Other Medication Itching     Patient states \"Epidural\"     Tetanus And Diphtheria Toxoids Hives and Nausea and Vomiting       Past Surgical History:   Procedure Laterality Date    COLONOSCOPY N/A 7/30/2021    COLONOSCOPY w polypectomies performed by Marco A Richardson MD at [facility] ENDOSCOPY     [address] HX ROTATOR CUFF REPAIR Left 04/12/2021    [place] ANESTH,SURGERY OF SHOULDER Left 04/12/2021       Social History     Socioeconomic History    Marital status:      Spouse name: Not on file    Number of children: Not on file    Years of education: Not on file    Highest education level: Not on file   Occupational History    Not on file   Tobacco Use    Smoking status: Never Smoker    Smokeless tobacco: Never Used   Vaping Use    Vaping Use: Never used   Substance and Sexual Activity    Alcohol use: No    Drug use: Never    Sexual activity: Not on file   Other Topics Concern    Not on file   Social History Narrative    ** Merged History Encounter **          Social Determinants of Health     Financial Resource Strain:     Difficulty of Paying Living Expenses:    Food Insecurity:     Worried About Running Out of Food in the Last Year:     Ran Out of Food in the Last Year:    Transportation Needs:     Lack of Transportation (Medical):      Lack of Transportation (Non-Medical):    Physical Activity:     Days of Exercise per Week:     Minutes of Exercise per Session:    Stress:     Feeling of Stress :    Social Connections:     Frequency of Communication with Friends and Family:     Frequency of Social Gatherings with Friends and Family:     Attends Faith Services:     Active Member of Clubs or Organizations:     Attends Club or Organization Meetings:     Marital Status:    Intimate Partner Violence:     Fear of Current or Ex-Partner:     Emotionally Abused:     Physically Abused:     Sexually Abused:        REVIEW OF SYSTEMS:      No changes from previous review of systems unless noted. PHYSICAL EXAMINATION:  Visit Vitals  Pulse 76   Temp 96.8 °F (36 °C)   Ht 5' 7\" (1.702 m)   Wt (!) 419 lb (190.1 kg)   SpO2 96%   BMI 65.62 kg/m²     Body mass index is 65.62 kg/m². GENERAL: Alert and oriented x3, in no acute distress. HEENT: Normocephalic, atraumatic. RESP: Non labored breathing. SKIN: No rashes or lesions noted. Shoulder Examination     R   L  ROM   FF  Full   Full  ER  Full   Full   IR  Full   Full  Rotator Cuff Pain   Supra  -   +   Infra  -   -   Subscap -   -  Crepitus  -   -  Effusion  -   -  Warmth  -   -   Erythema  -   -  Instability  -   -  AC Joint TTP  -   -  Clavicle   Deformity -   -   TTP  -   -  Proximal Humerus   Deformity -   -   TTP  -   -  Deltoid Strength 5   5  Biceps Strength 5   5  Biceps Deformity -   -  Biceps Groove Pain -   -  Impingement Sign -   -       IMAGING:  No imaging today    ASSESSMENT & PLAN  Diagnosis: Status post left shoulder arthroscopic rotator cuff repair and biceps tenotomy, 4 and half months    Nathanial Goltz is recovering well and continues to recover from her left shoulder surgery. At this point I would let her go back to work on the 27th of this month. She will have light duty restrictions with no overhead work and no lifting greater than 5 pounds with the left arm. I will plan to see her back in 6 weeks. We will keep these restrictions for 2 months and hopefully be able to advance her back to full duty in the next few months. She will also continue with physical therapy which I reordered today. Electronically signed by: Felisha Sierra MD    Note: This note was completed using voice recognition software.   Any typographical/name errors or mistakes are unintentional.

## 2021-08-26 ENCOUNTER — HOSPITAL ENCOUNTER (OUTPATIENT)
Dept: PHYSICAL THERAPY | Age: 56
Discharge: HOME | End: 2021-08-26
Attending: ORTHOPAEDIC SURGERY
Payer: COMMERCIAL

## 2021-08-26 PROCEDURE — 97112 NEUROMUSCULAR REEDUCATION: CPT

## 2021-08-26 PROCEDURE — 97110 THERAPEUTIC EXERCISES: CPT

## 2021-08-26 PROCEDURE — 97530 THERAPEUTIC ACTIVITIES: CPT

## 2021-08-26 NOTE — PROGRESS NOTES
PT DAILY TREATMENT NOTE     Patient Name: Mary Ellen Garcia  Date:2021  : 1965  [x]  Patient  Verified  Payor: Albert Bustos / Plan: Subhash Segura PPO / Product Type: PPO /    In time:1:33  Out time:2:27  Total Treatment Time (min): 54  Visit #: 6 of 10    Medicare/BCBS Only   Total Timed Codes (min):  1:1 Treatment Time:         Treatment Area: Pain in left shoulder [M25.512]    SUBJECTIVE  Pain Level (0-10 scale): 3  Any medication changes, allergies to medications, adverse drug reactions, diagnosis change, or new procedure performed?: [x] No    [] Yes (see summary sheet for update)  Subjective functional status/changes:   [] No changes reported  \"Im going back to work on Monday. \"    OBJECTIVE    Modality rationale: decrease edema, decrease inflammation, decrease pain, increase tissue extensibility and increase muscle contraction/control to improve the patients ability to perform ADL    Min Type Additional Details    [] Estim:  []Unatt       []IFC  []Premod                        []Other:  []w/ice   []w/heat  Position:  Location:    [] Estim: []Att    []TENS instruct  []NMES                    []Other:  []w/US   []w/ice   []w/heat  Position:  Location:    []  Traction: [] Cervical       []Lumbar                       [] Prone          []Supine                       []Intermittent   []Continuous Lbs:  [] before manual  [] after manual    []  Ultrasound: []Continuous   [] Pulsed                           []1MHz   []3MHz W/cm2:  Location:    []  Iontophoresis with dexamethasone         Location: [] Take home patch   [] In clinic    []  Ice     []  heat  []  Ice massage  []  Laser   []  Anodyne Position:  Location:    []  Laser with stim  []  Other:  Position:  Location:    []  Vasopneumatic Device    []  Right     []  Left  Pre-treatment girth:  Post-treatment girth:  Measured at (location):  Pressure:       [] lo [] med [] hi   Temperature: [] lo [] med [] hi   [x] Skin assessment post-treatment: [x]intact []redness- no adverse reaction    []redness  adverse reaction:      min []Eval                  []Re-Eval       24 min Therapeutic Exercise:  [x] See flow sheet :   Rationale: increase ROM, increase strength and improve coordination to improve the patients ability to perform ADL    15 min Therapeutic Activity:  [x]  See flow sheet :   Rationale: increase ROM, increase strength and improve coordination  to improve the patients ability to perform ADL      15 min Neuromuscular Re-education:  [x]  See flow sheet :   Rationale: increase ROM, increase strength, improve coordination, improve balance and increase proprioception  to improve the patients ability to perform ADL      min Manual Therapy: The manual therapy interventions were performed at a separate and distinct time from the therapeutic activities interventions. Rationale: decrease pain, increase ROM, increase tissue extensibility, decrease edema , decrease trigger points and increase postural awareness to perform ADL     min Gait Training:  ___ feet with ___ device on level surfaces with ___ level of assist   Rationale: With   [x] TE   [x] TA   [] neuro   [] other: Patient Education: [x] Review HEP    [] Progressed/Changed HEP based on:   [] positioning   [] body mechanics   [] transfers   [] heat/ice application    [x] other: REASSESS GOALS     Other Objective/Functional Measures:  FOTO  53   AROM  F  Sitting supine  140 with upper trap shrug  ABD   115   ER 75    IR  80    Pain Level (0-10 scale) post treatment:   ASSESSMENT/Changes in Function: Ms. Cardenas Reach reports  85% improvement. Pain level on average is 3/10. Pt reports difficulty reaching overhead and holding medium weight items with left shoulder. FOTO has improved from 45 to 53.  AROM left shoulder has improved since initial eval.    Patient will continue to benefit from skilled PT services to address functional mobility deficits, address ROM deficits, address strength deficits, analyze and address soft tissue restrictions, analyze and cue movement patterns, analyze and modify body mechanics/ergonomics, assess and modify postural abnormalities and instruct in home and community integration to attain remaining goals. []  See Plan of Care  [x]  See progress note/recertification  []  See Discharge Summary         Progress towards goals / Updated goals:  1. Pt will increase FOTO score to 64 points to improve ability to perform ADLs.             PN: reassess at upcoming sessions              Current: will assess at next progress note - progressing  2.  Pt will increase AROM left shoulder flex to at least 120 degs, ABD to 100 degs, ER to 65 degs, and IR to 70 degs to improve ability to tolerate dressing tasks.               PN: left shoulder AROM: flex 85 degs with pain, ABD 70 degs with pain, ER 55 degs, IR 60 degs               Current: Left shoulder AROM in sitting/supine: flexion 140 deg (with upper trap shrugging), abduction 115 deg (with upper trap shrugging), ER 75 deg, IR 80 deg 8/23/21 - met  3.  Pt will be able to reach into a shoulder height shelf with the left UE with mild to no pain or difficulty to improve independence in the home.               PN: reports she is unable to reach into a shelf with the left UE.               Current: continues to report quite a bit of difficulty reaching  At shoulder height and overhead - progressing 8/26       PLAN  []  Upgrade activities as tolerated     []  Continue plan of care  []  Update interventions per flow sheet       []  Discharge due to:_  [x]  Other:_  FAX MD NOTE    Deep Reilly PTA 8/26/2021  1:42 PM    Future Appointments   Date Time Provider Deisy Palomo   8/30/2021 10:30 AM Madeleine Babb PTA [facility]   9/2/2021  9:45 AM Justyn King PT [facility]   9/9/2021  9:00 AM [facility] RM 3 3D [facility]   9/9/2021  9:30 AM [facility] US RM 1 [facility]   10/5/2021  1:30 PM Adama Christina MD [facility] BS AMB 10/8/2021 10:30 AM Roderick Hodgkin, MD [facility] BS AMB   8/22/2022  9:00 AM Jason Alcazar MD [facility] BS AMB

## 2021-08-26 NOTE — PROGRESS NOTES
[facility] [address], [address] [place] [address], [address] [place], [place] [address]  [phone] fax    Physician Update  [x] Progress Note  [] Discharge Summary  Patient name: Jovi Iniguez Start of Care: 2021   Referral source: Nick Amaya : 1965   Medical/Treatment Diagnosis: Pain in left shoulder [M25.512]  Payor: Corky Carter / Plan: JAIDEN [employer] PPO / Product Type: PPO /  Onset Date:DOS 2021                 Prior Hospitalization: see medical history Provider#: [id number]   Medications: Verified on Patient Summary List    Comorbidities: asthma, lymphedema  Prior Level of Function:Independent with ADls, functional, and work activities with pain in the left shoulder before surgery.     Visits from Start of Care: 25    Missed Visits:     Status at Evaluation/Last Progress Note:  See last MD note  Progress towards Goals: Ms. Gustavo Angulo reports  85% improvement. Pain level on average is 3/10. Pt reports difficulty reaching overhead and holding medium weight items with left shoulder. FOTO has improved from 45 to 53. AROM left shoulder has improved since initial eval.  Goals: to be achieved in  weeks: 1. Pt will increase FOTO score to 64 points to improve ability to perform ADLs. PN  53  2. Pt will be able to reach into a shoulder height shelf with the left UE with mild to no pain or difficulty to improve independence in the home. PN continues to report quite a bit of difficulty reaching  At shoulder height and overhead   3. Pt will be able to hold medium weight Items with little to no difficulty while performing household activities.   PN moderate difficulty    ASSESSMENT/RECOMMENDATIONS:  [x]Continue therapy per initial plan/protocol at a frequency of  2 x per week for 4 weeks  []Continue therapy with the following recommended changes:_____________________      _____________________________________________________________________  []Discontinue therapy progressing towards or have reached established goals  []Discontinue therapy due to lack of appreciable progress towards goals  []Discontinue therapy due to lack of attendance or compliance  []Await Physician's recommendations/decisions regarding therapy  []Other:________________________________________________________________    Thank you for this referral. Ash Iraheta, PTA 8/26/2021 3:27 PM  NOTE TO PHYSICIAN:  [address] TO [facility]: [phone]  If you are unable to process this request in 24 hours please contact our office: [phone]    ? I have read the above report and request that my patient continue as recommended. ? I have read the above report and request that my patient continue therapy with the following changes/special instructions:_____________________________________  ? I have read the above report and request that my patient be discharged from therapy.     Physician's Signature:____________Date:_________TIME:________     CHERELLE Shoemaker  ** Signature, Date and Time must be completed for valid certification **

## 2021-08-30 ENCOUNTER — APPOINTMENT (OUTPATIENT)
Dept: PHYSICAL THERAPY | Age: 56
End: 2021-08-30
Attending: ORTHOPAEDIC SURGERY
Payer: COMMERCIAL

## 2021-09-02 ENCOUNTER — APPOINTMENT (OUTPATIENT)
Dept: PHYSICAL THERAPY | Age: 56
End: 2021-09-02
Attending: ORTHOPAEDIC SURGERY
Payer: COMMERCIAL

## 2021-09-08 ENCOUNTER — HOSPITAL ENCOUNTER (OUTPATIENT)
Dept: PHYSICAL THERAPY | Age: 56
Discharge: HOME | End: 2021-09-08
Attending: ORTHOPAEDIC SURGERY
Payer: COMMERCIAL

## 2021-09-08 PROCEDURE — 97112 NEUROMUSCULAR REEDUCATION: CPT | Performed by: PHYSICAL THERAPIST

## 2021-09-08 PROCEDURE — 97110 THERAPEUTIC EXERCISES: CPT | Performed by: PHYSICAL THERAPIST

## 2021-09-08 PROCEDURE — 97530 THERAPEUTIC ACTIVITIES: CPT | Performed by: PHYSICAL THERAPIST

## 2021-09-08 PROCEDURE — 97014 ELECTRIC STIMULATION THERAPY: CPT | Performed by: PHYSICAL THERAPIST

## 2021-09-08 NOTE — PROGRESS NOTES
PT DAILY TREATMENT NOTE     Patient Name: Nancy Waters  Date:2021  : 1965  [x]  Patient  Verified  Payor: Yuri Castano / Plan: Syed Stacy PPO / Product Type: PPO /    In time:1115A  Out time:12:15P  Total Treatment Time (min): 60min  Visit #: 1 of 8    Treatment Area: Pain in left shoulder [M25.512]    SUBJECTIVE  Pain Level (0-10 scale): 410  Any medication changes, allergies to medications, adverse drug reactions, diagnosis change, or new procedure performed?: [x] No    [] Yes (see summary sheet for update)  Subjective functional status/changes:   [] No changes reported  Patient states she is feeling a constant ache at this point. Went back to work and is feeling okay but also feeling she hasn't done PT in a week, which will set her pain back. OBJECTIVE         Modality rationale: decrease edema, decrease inflammation, decrease pain, increase tissue extensibility and increase muscle contraction/control to improve the patients ability to perform ADL    Min Type Additional Details    15  [x]? Estim:  [x]? Unatt       [x]? IFC  []? Premod                        []?Other:  [x]?w/ice   []?w/heat  Position: sitting up  Location: Left shoulder      []? Estim: []? Att    []? TENS instruct  []? NMES                    []?Other:  []?w/US   []?w/ice   []?w/heat  Position:  Location:      []? Traction: []? Cervical       []? Lumbar                       []? Prone          []? Supine                       []?Intermittent   []? Continuous Lbs:  []? before manual  []? after manual      []? Ultrasound: []? Continuous   []? Pulsed                           []? 1MHz   []? 3MHz W/cm2:  Location:      []? Iontophoresis with dexamethasone         Location: []? Take home patch   []? In clinic      []? Ice     []?  heat  []? Ice massage  []? Laser   []? Anodyne Position:  Location:      []? Laser with stim  []? Other:  Position:  Location:      []? Vasopneumatic Device    []? Right     []? Left  Pre-treatment girth:  Post-treatment girth:  Measured at (location):  Pressure:       []? lo []? med []? hi   Temperature: []? lo []? med []? hi    [x]? Skin assessment post-treatment:  [x]? intact []? redness- no adverse reaction    []? redness  adverse reaction:   25 min Therapeutic Exercise:  [x]? See flow sheet :   Rationale: increase ROM and increase strength to improve the patients ability to increase A/ROM and decrease pain with movement.     10 min Therapeutic Activity:  [x]? See flow sheet :   Rationale: increase strength and improve coordination  to improve the patients ability to Tolerate basic ADLs and household-related tasks without pain. 10 min Neuromuscular Re-education:  [x]? See flow sheet :   Rationale: improve coordination, improve balance and increase proprioception  to improve the patients ability to the patients ability to perform functional tasks such as overhead reach when appropriat          With   [x] TE   [x] TA   [x] neuro   [] other: Patient Education: [x] Review HEP    [x] Progressed/Changed HEP based on:   [x] positioning   [] body mechanics   [] transfers   [] heat/ice application    [] other:      Other Objective/Functional Measures: active abduction of Left 90 degrees in standing      Pain Level (0-10 scale) post treatment: 5/10    ASSESSMENT/Changes in Function: Patient is progressing slowly towards goals of decreased pain, well towards improve A/ROM, but continues to need guidance and verbal and tactile cuing for proper form and positioning.      Patient will continue to benefit from skilled PT services to modify and progress therapeutic interventions, address functional mobility deficits, address ROM deficits, address strength deficits, analyze and address soft tissue restrictions, analyze and cue movement patterns, analyze and modify body mechanics/ergonomics, assess and modify postural abnormalities, address imbalance/dizziness and instruct in home and community integration to attain remaining goals. [x]  See Plan of Care  []  See progress note/recertification  []  See Discharge Summary         Progress towards goals / Updated goals: 1. Pt will increase FOTO score to 64 points to improve ability to perform ADLs. PN  53  2. Pt will be able to reach into a shoulder height shelf with the left UE with mild to no pain or difficulty to improve independence in the home. PN continues to report quite a bit of difficulty reaching  At shoulder height and overhead   Current: Moderate difficulty- progressing   3. Pt will be able to hold medium weight Items with little to no difficulty while performing household activities.      PN moderate difficulty    PLAN  [x]  Upgrade activities as tolerated     []  Continue plan of care  []  Update interventions per flow sheet       []  Discharge due to:_  []  Other:_      Chang Nunn, PT 9/8/2021  12:08 PM    Future Appointments   Date Time Provider Deisy Palomo   9/9/2021  9:00 AM [facility] RM 3 3D [facility]   9/9/2021  9:30 AM [facility] US RM 1 [facility]   9/13/2021 11:15 AM Elza Minor, PTA [facility] [address]   9/16/2021 11:15 AM Chang Nunn, PT [facility] [address]   9/20/2021 11:15 AM Carleen, Crystal, PTA [facility] [address]   9/23/2021 11:15 AM Carleen, Crystal, PTA [facility] [address]   9/27/2021 11:15 AM Gwynneth Scot, PT [facility] [address]   9/30/2021 11:15 AM Letitia Seller, PTA [facility] [address]   10/4/2021 11:15 AM Gwynneth Scot, PT [facility] [address]   10/5/2021  1:30 PM Jeny Sandy MD [facility] BS AMB   10/7/2021 11:15 AM Letitia Seller, PTA [facility] [address]   10/8/2021 10:30 AM Jeny Sandy MD [facility] BS AMB   8/22/2022  9:00 AM Ivone Schulte MD [facility] BS AMB

## 2021-09-09 ENCOUNTER — HOSPITAL ENCOUNTER (OUTPATIENT)
Dept: ULTRASOUND IMAGING | Age: 56
Discharge: HOME | End: 2021-09-09
Attending: FAMILY MEDICINE
Payer: COMMERCIAL

## 2021-09-09 ENCOUNTER — HOSPITAL ENCOUNTER (OUTPATIENT)
Dept: MAMMOGRAPHY | Age: 56
Discharge: HOME | End: 2021-09-09
Attending: FAMILY MEDICINE
Payer: COMMERCIAL

## 2021-09-09 DIAGNOSIS — N63.20 LEFT BREAST LUMP: ICD-10-CM

## 2021-09-09 PROCEDURE — 76642 ULTRASOUND BREAST LIMITED: CPT

## 2021-09-09 PROCEDURE — 77061 BREAST TOMOSYNTHESIS UNI: CPT

## 2021-09-09 PROCEDURE — 77065 DX MAMMO INCL CAD UNI: CPT

## 2021-09-13 ENCOUNTER — APPOINTMENT (OUTPATIENT)
Dept: PHYSICAL THERAPY | Age: 56
End: 2021-09-13
Attending: ORTHOPAEDIC SURGERY
Payer: COMMERCIAL

## 2021-09-14 ENCOUNTER — HOSPITAL ENCOUNTER (OUTPATIENT)
Dept: PHYSICAL THERAPY | Age: 56
Discharge: HOME | End: 2021-09-14
Attending: ORTHOPAEDIC SURGERY
Payer: COMMERCIAL

## 2021-09-14 PROCEDURE — 97530 THERAPEUTIC ACTIVITIES: CPT

## 2021-09-14 PROCEDURE — 97112 NEUROMUSCULAR REEDUCATION: CPT

## 2021-09-14 PROCEDURE — 97110 THERAPEUTIC EXERCISES: CPT

## 2021-09-14 NOTE — PROGRESS NOTES
PT DAILY TREATMENT NOTE     Patient Name: Namoi Petty  Date:2021  : 1965  [x]  Patient  Verified  Payor: Amanuel Segura / Plan: Gurjit Gabmino PPO / Product Type: PPO /    In time: 11:32  Out time:12:10   Total Treatment Time (min): 38  Visit #: 2 of 8      Treatment Area: Pain in left shoulder [M25.512]    SUBJECTIVE  Pain Level (0-10 scale): 3/10  Any medication changes, allergies to medications, adverse drug reactions, diagnosis change, or new procedure performed?: [x] No    [] Yes (see summary sheet for update)  Subjective functional status/changes:   [] No changes reported  Patient stated that she feels like PT has been helping. Patient continues to have difficulty with lifting left arm all the way up and reaching behind her back.      OBJECTIVE    Modality rationale: PD   Min Type Additional Details    [] Estim:  []Unatt       []IFC  []Premod                        []Other:  []w/ice   []w/heat  Position:  Location:    [] Estim: []Att    []TENS instruct  []NMES                    []Other:  []w/US   []w/ice   []w/heat  Position:  Location:    []  Traction: [] Cervical       []Lumbar                       [] Prone          []Supine                       []Intermittent   []Continuous Lbs:  [] before manual  [] after manual    []  Ultrasound: []Continuous   [] Pulsed                           []1MHz   []3MHz W/cm2:  Location:    []  Iontophoresis with dexamethasone         Location: [] Take home patch   [] In clinic    []  Ice     []  heat  []  Ice massage  []  Laser   []  Anodyne Position:  Location:    []  Laser with stim  []  Other:  Position:  Location:    []  Vasopneumatic Device    []  Right     []  Left  Pre-treatment girth:  Post-treatment girth:  Measured at (location):  Pressure:       [] lo [] med [] hi   Temperature: [] lo [] med [] hi   [] Skin assessment post-treatment:  []intact []redness- no adverse reaction    []redness  adverse reaction:     13 min Therapeutic Exercise:  [x] See flow sheet : focus on improving available left UE ROM and strength    Rationale: increase ROM and increase strength to improve the patients ability to perform ADls safely and efficiently     10 min Therapeutic Activity:  [x]  See flow sheet : overhead reaching and shoulder elevation to increase ease with household management tasks    Rationale: increase ROM, increase strength and improve coordination  to improve the patients ability to perform household management tasks safely      15 min Neuromuscular Re-education:  [x]  See flow sheet : focus on improving scapular musculature activation to improve scapulohumeral rhythm. Rationale: increase strength, improve coordination and increase proprioception  to improve the patients ability to perform community ADls safely. With   [] TE   [] TA   [] neuro   [] other: Patient Education: [x] Review HEP    [] Progressed/Changed HEP based on:   [] positioning   [] body mechanics   [] transfers   [] heat/ice application    [] other:      Other Objective/Functional Measures: patient reports moderate difficulty with placing cones at a shelf height slightly above shoulder height. Pain Level (0-10 scale) post treatment: 6/10     ASSESSMENT/Changes in Function: Therapist provided cues for correct technique, muscle activation, and emphasis on posture with exercises. Patient challenged with performing left shoulder AROM flexion and scaption with report of discomfort at anterior shoulder. Patient was able to place cones on top of shelf, but reported increased discomfort at anterior shoulder toward the end of the reps. Advised patient that if irritation persists to notify therapist next session so exercise can be modified as needed.      Patient will continue to benefit from skilled PT services to modify and progress therapeutic interventions, address functional mobility deficits, address ROM deficits, address strength deficits, analyze and address soft tissue restrictions, analyze and cue movement patterns, analyze and modify body mechanics/ergonomics and assess and modify postural abnormalities to attain remaining goals. []  See Plan of Care  []  See progress note/recertification  []  See Discharge Summary         Progress towards goals / Updated goals: 1. Pt will increase FOTO score to 64 points to improve ability to perform ADLs.                PN  53  2. Pt will be able to reach into a shoulder height shelf with the left UE with mild to no pain or difficulty to improve independence in the home.               PN continues to report quite a bit of difficulty reaching  At shoulder height and overhead              Current: patient reports moderate difficulty with placing cones at a shelf height slightly above shoulder height.     3. Pt will be able to hold medium weight Items with little to no difficulty while performing household activities.                PN moderate difficulty    PLAN  []  Upgrade activities as tolerated     [x]  Continue plan of care  []  Update interventions per flow sheet       []  Discharge due to:_  []  Other:_       Mihai Reed, PT 9/14/2021  11:38 AM    Future Appointments   Date Time Provider Deisy Palomo   9/16/2021 11:15 AM Aric Thomson, PT [facility]   9/20/2021 11:15 AM Milena Durant, PTA [facility]   9/23/2021 11:15 AM Milena Durant, PTA [facility]   9/27/2021 11:15 AM Sandra President, PT [facility]   9/30/2021 11:15 AM Desire Lewis, PTA [facility]   10/4/2021 11:15 AM Thresea President, PT [facility]   10/5/2021  1:30 PM Gabi Pittman MD [facility] BS AMB   10/7/2021 11:15 AM Desire Lewis, PTA [facility]   10/8/2021 10:30 AM Gabi Pittman MD [facility] BS AMB   8/22/2022  9:00 AM Cm Thurston MD [facility] BS AMB

## 2021-09-16 ENCOUNTER — APPOINTMENT (OUTPATIENT)
Dept: PHYSICAL THERAPY | Age: 56
End: 2021-09-16
Attending: ORTHOPAEDIC SURGERY
Payer: COMMERCIAL

## 2021-09-20 ENCOUNTER — APPOINTMENT (OUTPATIENT)
Dept: PHYSICAL THERAPY | Age: 56
End: 2021-09-20
Attending: ORTHOPAEDIC SURGERY
Payer: COMMERCIAL

## 2021-09-23 ENCOUNTER — HOSPITAL ENCOUNTER (OUTPATIENT)
Dept: PHYSICAL THERAPY | Age: 56
Discharge: HOME | End: 2021-09-23
Attending: ORTHOPAEDIC SURGERY
Payer: COMMERCIAL

## 2021-09-23 PROCEDURE — 97110 THERAPEUTIC EXERCISES: CPT

## 2021-09-23 PROCEDURE — 97014 ELECTRIC STIMULATION THERAPY: CPT

## 2021-09-23 PROCEDURE — 97530 THERAPEUTIC ACTIVITIES: CPT

## 2021-09-23 PROCEDURE — 97140 MANUAL THERAPY 1/> REGIONS: CPT

## 2021-09-23 NOTE — PROGRESS NOTES
PT DAILY TREATMENT NOTE     Patient Name: Te Schulte  Date:2021  : 1965  [x]  Patient  Verified  Payor: Larry Sender / Plan: Mihai Woo PPO / Product Type: PPO /    In time:11:19  Out time:12:20  Total Treatment Time (min): 61  Visit #: 3 of 8    Medicare/BCBS Only   Total Timed Codes (min):   1:1 Treatment Time:         Treatment Area: Pain in left shoulder [M25.512]    SUBJECTIVE  Pain Level (0-10 scale): 0  Any medication changes, allergies to medications, adverse drug reactions, diagnosis change, or new procedure performed?: [x] No    [] Yes (see summary sheet for update)  Subjective functional status/changes:   [] No changes reported  \"No pain just my knees hurts. \"    OBJECTIVE    Modality rationale: decrease edema, decrease inflammation, decrease pain, increase tissue extensibility and increase muscle contraction/control to improve the patients ability to perform ADL    Min Type Additional Details   15 [x] Estim:  [x]Unatt       [x]IFC  []Premod                        []Other:  []w/ice   []w/heat  Position:long sit  Location:left shoulder    [] Estim: []Att    []TENS instruct  []NMES                    []Other:  []w/US   []w/ice   []w/heat  Position:  Location:    []  Traction: [] Cervical       []Lumbar                       [] Prone          []Supine                       []Intermittent   []Continuous Lbs:  [] before manual  [] after manual    []  Ultrasound: []Continuous   [] Pulsed                           []1MHz   []3MHz W/cm2:  Location:    []  Iontophoresis with dexamethasone         Location: [] Take home patch   [] In clinic    []  Ice     []  heat  []  Ice massage  []  Laser   []  Anodyne Position:  Location:    []  Laser with stim  []  Other:  Position:  Location:    []  Vasopneumatic Device    []  Right     []  Left  Pre-treatment girth:  Post-treatment girth:  Measured at (location):  Pressure:       [] lo [] med [] hi   Temperature: [] lo [] med [] hi   [x] Skin assessment post-treatment:  [x]intact []redness- no adverse reaction    []redness  adverse reaction:      min []Eval                  []Re-Eval       25 min Therapeutic Exercise:  [x] See flow sheet :   Rationale: increase ROM, increase strength and improve coordination to improve the patients ability to perform ADL    10 min Therapeutic Activity:  [x]  See flow sheet :   Rationale: increase ROM, increase strength and improve coordination  to improve the patients ability to perform ADL       min Neuromuscular Re-education:  []  See flow sheet :   Rationale: increase ROM, increase strength, improve coordination, improve balance and increase proprioception  to improve the patients ability to perform ADL     11 min Manual Therapy:  [facility] 3 with distraction all plane   The manual therapy interventions were performed at a separate and distinct time from the therapeutic activities interventions. Rationale: decrease pain, increase ROM, increase tissue extensibility, decrease edema , decrease trigger points and increase postural awareness to perform ADL      min Gait Training:  ___ feet with ___ device on level surfaces with ___ level of assist   Rationale: With   [x] TE   [] TA   [] neuro   [] other: Patient Education: [x] Review HEP    [] Progressed/Changed HEP based on:   [] positioning   [] body mechanics   [] transfers   [] heat/ice application    [] other:      Other Objective/Functional Measures:      Pain Level (0-10 scale) post treatment: 5    ASSESSMENT/Changes in Function: Pt experienced increased pain with theraband ER. Pt was tender at anterior left shoulder during manual but was able to tolerate manual. Following there ex pt experienced increased pain.     Patient will continue to benefit from skilled PT services to address functional mobility deficits, address ROM deficits, address strength deficits, analyze and address soft tissue restrictions, analyze and cue movement patterns, analyze and modify body mechanics/ergonomics, assess and modify postural abnormalities and instruct in home and community integration to attain remaining goals. [x]  See Plan of Care  []  See progress note/recertification  []  See Discharge Summary         Progress towards goals / Updated goals: 1. Pt will increase FOTO score to 64 points to improve ability to perform ADLs.                PN  53  2. Pt will be able to reach into a shoulder height shelf with the left UE with mild to no pain or difficulty to improve independence in the home.               PN continues to report quite a bit of difficulty reaching  At shoulder height and overhead              Current: patient reports moderate difficulty with placing cones at a shelf height slightly above shoulder height. 3. Pt will be able to hold medium weight Items with little to no difficulty while performing household activities.                 PN moderate difficulty    PLAN  [x]  Upgrade activities as tolerated     []  Continue plan of care  []  Update interventions per flow sheet       []  Discharge due to:_  []  Other:_      Milena Durant, TINY 9/23/2021  11:21 AM    Future Appointments   Date Time Provider Deisy Palomo   9/27/2021 11:15 AM Lori Lopez PT [facility]   9/30/2021 11:15 AM Jersey Barbosa PTA [facility]   10/4/2021 11:15 AM Lori Lopez PT [facility]   10/5/2021  1:30 PM Maxi Go MD [facility] BS AMB   10/7/2021 11:15 AM Jersey Barbosa PTA [facility]   10/8/2021 10:30 AM Maxi Go MD [facility] BS AMB   8/22/2022  9:00 AM Kyara Zhong MD [facility] BS AMB

## 2021-09-27 ENCOUNTER — HOSPITAL ENCOUNTER (OUTPATIENT)
Dept: PHYSICAL THERAPY | Age: 56
Discharge: HOME | End: 2021-09-27
Attending: ORTHOPAEDIC SURGERY
Payer: COMMERCIAL

## 2021-09-27 PROCEDURE — 97530 THERAPEUTIC ACTIVITIES: CPT

## 2021-09-27 PROCEDURE — 97110 THERAPEUTIC EXERCISES: CPT

## 2021-09-27 NOTE — PROGRESS NOTES
PT DAILY TREATMENT NOTE     Patient Name: Prasanna Green  Date:2021  : 1965  [x]  Patient  Verified  Payor: Parviz Pryor / Plan: Tayla Brunner PPO / Product Type: PPO /    In time:1122  Out time:1200  Total Treatment Time (min): 38  Visit #: 1 of 8         Treatment Area: Pain in left shoulder [M25.512]    SUBJECTIVE  Pain Level (0-10 scale): 2  Any medication changes, allergies to medications, adverse drug reactions, diagnosis change, or new procedure performed?: [x] No    [] Yes (see summary sheet for update)  Subjective functional status/changes:   [] No changes reported  \"I am just really tired today, I am not having as much pain, just really tired today. \"  Reports still UA to place left UE behind her back, states it is painful and still hurts.     OBJECTIVE    Modality rationale:    Min Type Additional Details    [] Estim:  []Unatt       []IFC  []Premod                        []Other:  []w/ice   []w/heat  Position:  Location:    [] Estim: []Att    []TENS instruct  []NMES                    []Other:  []w/US   []w/ice   []w/heat  Position:  Location:    []  Traction: [] Cervical       []Lumbar                       [] Prone          []Supine                       []Intermittent   []Continuous Lbs:  [] before manual  [] after manual    []  Ultrasound: []Continuous   [] Pulsed                           []1MHz   []3MHz W/cm2:  Location:    []  Iontophoresis with dexamethasone         Location: [] Take home patch   [] In clinic   PD []  Ice     []  heat  []  Ice massage  []  Laser   []  Anodyne Position:  Location:    []  Laser with stim  []  Other:  Position:  Location:    []  Vasopneumatic Device    []  Right     []  Left  Pre-treatment girth:  Post-treatment girth:  Measured at (location):  Pressure:       [] lo [] med [] hi   Temperature: [] lo [] med [] hi   [] Skin assessment post-treatment:  []intact []redness- no adverse reaction    []redness  adverse reaction:          25 min Therapeutic Exercise:  [] See flow sheet :   Rationale: increase ROM, increase strength and improve coordination to improve the patients ability to tolerate ADLS and activities    13 min Therapeutic Activity:  []  See flow sheet :   Rationale: increase ROM, increase strength and improve coordination  to improve the patients ability to tolerate ADLs and activities             With   [x] TE   [x] TA   [] neuro   [] other: Patient Education: [x] Review HEP    [] Progressed/Changed HEP based on:   [] positioning   [] body mechanics   [] transfers   [] heat/ice application    [] other:      Other Objective/Functional Measures: VC exercises and technique    AROM sitting F and Abd 92 each    HBH able   MMT IR 4+    ER 4-  F 4 and abd 4-       Pain Level (0-10 scale) post treatment: 4    ASSESSMENT/Changes in Function: patient tired today and getting ready to go to work. She reports continued weakness of the left UE affecting function with lifting and carrying and reaching behind her. Patient will continue to benefit from skilled PT services to modify and progress therapeutic interventions, address ROM deficits, address strength deficits, analyze and address soft tissue restrictions, analyze and cue movement patterns, analyze and modify body mechanics/ergonomics, assess and modify postural abnormalities and instruct in home and community integration to attain remaining goals. [x]  See Plan of Care  [x]  See progress note/recertification  []  See Discharge Summary         Progress towards goals / Updated goals: 1. Pt will increase FOTO score to 64 points to improve ability to perform ADLs.                PN  53   Current 59  9/27/21  2. Pt will be able to reach into a shoulder height shelf with the left UE with mild to no pain or difficulty to improve independence in the home.               PN continues to report quite a bit of difficulty reaching at shoulder height and overhead              Current: patient reports moderate difficulty with placing cones at a shelf height slightly above shoulder height.  9/27/21     3. Pt will be able to hold medium weight Items with little to no difficulty while performing household activities.                PN moderate difficulty   CURRENTLY reports it could be better, that she is still weak.  9/27/21    PLAN  [x]  Upgrade activities as tolerated     [x]  Continue plan of care  []  Update interventions per flow sheet       []  Discharge due to:_  [x]  Other:_  MD follow up is 10/5/21    Radha Alcala PT 9/27/2021  11:28 AM    Future Appointments   Date Time Provider Deisy Palomo   9/30/2021 11:15 AM Marleny Mata PTA [facility]   10/4/2021 11:15 AM Carolina Lux PT [facility]   10/5/2021  1:30 PM Faisal Gomez MD [facility] BS AMB   10/7/2021 11:15 AM Marleny Mata PTA [facility]   10/8/2021 10:30 AM Faisal Gomez MD [facility] BS AMB   8/22/2022  9:00 AM Curtis Ramírez MD [facility] BS AMB

## 2021-09-27 NOTE — PROGRESS NOTES
[facility] [address] [id number] [facility], [address] [place] [address], [facility] [address]  [phone] fax    Physician Update  [x] Progress Note  [] Discharge Summary  Patient name: Kali Rey Start of Care: 21   Referral source: Justin Chen, [place] : 1965   Medical/Treatment Diagnosis: Pain in left shoulder [M25.512]  Payor: Xavier Ontiveros / Plan: Sheryle Danas PPO / Product Type: PPO /  Onset Date:DOS 21     Prior Hospitalization: see medical history Provider#: [id number]   Medications: Verified on Patient Summary List    Comorbidities: asthma, lymphedema  Prior Level of Function:Independent with ADls, functional, and work activities with pain in the left shoulder before surgery.      Visits from Start of Care: 29    Missed Visits: 1    Status at Evaluation/Last Progress Note: see last MD note   Progress towards Goals: FOTO improved to 59, pain 2 today. Reports continued weakness left UE affecting tolerance to her ADLS and activities at home and at work. AROM F and Abd 92 and Hand behind able to perform. Reports painful and decrease ROM hand behind back. She has HEP and reports compliance as able. Patient will continue to benefit from skilled PT services to modify and progress therapeutic interventions, address ROM deficits, address strength deficits, analyze and address soft tissue restrictions, analyze and cue movement patterns, analyze and modify body mechanics/ergonomics, assess and modify postural abnormalities and instruct in home and community integration to attain remaining goals. Goals: to be achieved in 4 weeks: 1. Pt will increase FOTO score to 64 points to improve ability to perform ADLs.              PN  59  21  2. Pt will be able to reach into a shoulder height shelf 1-2#  with the left UE with mild to no pain or difficulty to improve independence in the home.              EW : patient reports moderate difficulty with placing cones at a shelf height slightly above shoulder height.  9/27/21     3. Pt will be able to hold medium weight Items (3-5#) with little to no difficulty while performing household activities.               PN reports it could be better, that she is still weak. 9/27/21  4 patient will have MMT left shoulder F and abd 4+ for carryover to activities at work   PN F 4 and abd 4-        ASSESSMENT/RECOMMENDATIONS:  [x]Continue therapy per initial plan/protocol at a frequency of  2 x per week for 4 weeks  []Continue therapy with the following recommended changes:_____________________      _____________________________________________________________________  []Discontinue therapy progressing towards or have reached established goals  []Discontinue therapy due to lack of appreciable progress towards goals  []Discontinue therapy due to lack of attendance or compliance  []Await Physician's recommendations/decisions regarding therapy  []Other:________________________________________________________________    Thank you for this referral. Addychelo Wendy, PT 9/27/2021 11:35 AM  NOTE TO PHYSICIAN:  [address] AND   FAX TO [facility]: [phone]  If you are unable to process this request in 24 hours please contact our office: [phone]    ? I have read the above report and request that my patient continue as recommended. ? I have read the above report and request that my patient continue therapy with the following changes/special instructions:_____________________________________  ? I have read the above report and request that my patient be discharged from therapy.     Physician's Signature:____________Date:_________TIME:________     Tennis Lipoma, PA  ** Signature, Date and Time must be completed for valid certification **

## 2021-09-30 ENCOUNTER — DOCUMENTATION ONLY (OUTPATIENT)
Dept: PULMONOLOGY | Age: 56
End: 2021-09-30

## 2021-09-30 ENCOUNTER — APPOINTMENT (OUTPATIENT)
Dept: PHYSICAL THERAPY | Age: 56
End: 2021-09-30
Attending: ORTHOPAEDIC SURGERY
Payer: COMMERCIAL

## 2021-09-30 NOTE — PROGRESS NOTES
Lf vm for pt to speak w/Jessica in regs to np sleep apt ref by Dr Nadege Hawkins; when/where if any evals/studies/cpap/dme?  Ask screening questions

## 2021-10-04 ENCOUNTER — APPOINTMENT (OUTPATIENT)
Dept: PHYSICAL THERAPY | Age: 56
End: 2021-10-04
Attending: ORTHOPAEDIC SURGERY
Payer: COMMERCIAL

## 2021-10-05 ENCOUNTER — OFFICE VISIT (OUTPATIENT)
Dept: ORTHOPEDIC SURGERY | Age: 56
End: 2021-10-05
Payer: COMMERCIAL

## 2021-10-05 VITALS
TEMPERATURE: 97.2 F | HEART RATE: 78 BPM | RESPIRATION RATE: 16 BRPM | BODY MASS INDEX: 45.99 KG/M2 | OXYGEN SATURATION: 99 % | HEIGHT: 67 IN | WEIGHT: 293 LBS

## 2021-10-05 DIAGNOSIS — M75.122 NONTRAUMATIC COMPLETE TEAR OF LEFT ROTATOR CUFF: Primary | ICD-10-CM

## 2021-10-05 PROCEDURE — 99213 OFFICE O/P EST LOW 20 MIN: CPT | Performed by: ORTHOPAEDIC SURGERY

## 2021-10-05 NOTE — PROGRESS NOTES
Patient: Emily Guerrero                MRN: [id number]       SSN: [id number]  YOB: 1965        AGE: 64 y.o. SEX: female  Body mass index is 67.22 kg/m². PCP: Ruth Morel MD  10/05/21    Chief Complaint: Left shoulder follow  p    HPI: Emily Guerrero is a 64 y.o. female patient who returns to the office today for her left shoulder. She is now about 6 months out from her left shoulder rotator cuff repair. Overall she is doing pretty well. Past Medical History:   Diagnosis Date    Adverse effect of anesthesia     \"Epidural\" allergy    Anemia     anemia    Arthritis     lft knee     Asthma     Lymphedema     R leg    Obesity     Obesity     Pulmonary emboli ([address] 05/2021    DVTs lower extremities, denies PE       Family History   Problem Relation Age of Onset    Cancer Mother     Diabetes Mother     Hypertension Mother     Cancer Father     No Known Problems Brother        Current Outpatient Medications   Medication Sig Dispense Refill    albuterol (PROVENTIL HFA, VENTOLIN HFA, PROAIR HFA) 90 mcg/actuation inhaler Take  by inhalation as needed for Wheezing.  multivitamin (ONE A DAY) tablet Take 1 Tab by mouth daily.  cyanocobalamin, vitamin B-12, (Vitamin B-12) 5,000 mcg subl 5,000 mcg by SubLINGual route daily. Dissolvable      acetaminophen (Tylenol Extra Strength) 500 mg tablet Take  by mouth every six (6) hours as needed for Pain.  cyclobenzaprine (FLEXERIL) 10 mg tablet TAKE 1 TABLET BY MOUTH NIGHTLY.  INDICATIONS: MUSCLE SPASM (Patient not taking: Reported on 8/24/2021) 30 Tablet 0    apixaban (Eliquis DVT-PE Treat 30D Start) 5 mg (74 tabs) starter pack Take 10 mg (two 5 mg tablets) by mouth twice a day for 7 days   Followed by 5 mg (one 5 mg tablet) by mouth twice a day (Patient not taking: Reported on 8/16/2021) 1 Dose Pack 0       Allergies   Allergen Reactions    Other Medication Itching     Patient states \"Epidural\"     Tetanus And Diphtheria Toxoids Hives and Nausea and Vomiting       Past Surgical History:   Procedure Laterality Date    COLONOSCOPY N/A 7/30/2021    COLONOSCOPY w polypectomies performed by Darcie Figueroa MD at [address] HX ROTATOR CUFF REPAIR Left 04/12/2021    DC ANESTH,SURGERY OF SHOULDER Left 04/12/2021       Social History     Socioeconomic History    Marital status:      Spouse name: Not on file    Number of children: Not on file    Years of education: Not on file    Highest education level: Not on file   Occupational History    Not on file   Tobacco Use    Smoking status: Never Smoker    Smokeless tobacco: Never Used   Vaping Use    Vaping Use: Never used   Substance and Sexual Activity    Alcohol use: No    Drug use: Never    Sexual activity: Not on file   Other Topics Concern    Not on file   Social History Narrative    ** Merged History Encounter **          Social Determinants of Health     Financial Resource Strain:     Difficulty of Paying Living Expenses:    Food Insecurity:     Worried About Running Out of Food in the Last Year:     Ran Out of Food in the Last Year:    Transportation Needs:     Lack of Transportation (Medical):  Lack of Transportation (Non-Medical):    Physical Activity:     Days of Exercise per Week:     Minutes of Exercise per Session:    Stress:     Feeling of Stress :    Social Connections:     Frequency of Communication with Friends and Family:     Frequency of Social Gatherings with Friends and Family:     Attends Islam Services:     Active Member of Clubs or Organizations:     Attends Club or Organization Meetings:     Marital Status:    Intimate Partner Violence:     Fear of Current or Ex-Partner:     Emotionally Abused:     Physically Abused:     Sexually Abused:        REVIEW OF SYSTEMS:      No changes from previous review of systems unless noted.     PHYSICAL EXAMINATION:  Visit Vitals  Pulse 78   Temp 97.2 °F (36.2 °C) (Temporal)   Resp 16   Ht 5' 7\" (1.702 m)   Wt (!) 429 lb 3.2 oz (194.7 kg)   SpO2 99%   BMI 67.22 kg/m²     Body mass index is 67.22 kg/m². GENERAL: Alert and oriented x3, in no acute distress. HEENT: Normocephalic, atraumatic. RESP: Non labored breathing. SKIN: No rashes or lesions noted. Shoulder Examination     R   L  ROM   FF  Full   140  ER  Full   Full   IR  Full   Full  Rotator Cuff Pain   Supra  -   +   Infra  -   -   Subscap -   -  Crepitus  -   -  Effusion  -   -  Warmth  -   -   Erythema  -   -  Instability  -   -  AC Joint TTP  -   -  Clavicle   Deformity -   -   TTP  -   -  Proximal Humerus   Deformity -   -   TTP  -   -  Deltoid Strength 5   5  Biceps Strength 5   5  Biceps Deformity -   -  Biceps Groove Pain -   -  Impingement Sign -   -       IMAGING:  No imaging today    ASSESSMENT & PLAN  Diagnosis: 6-month status post left rotator cuff repair    Mark Ward can continue with light duty restrictions for another 6 weeks at work. She will transition to physical therapy once per week. I will see her back in 6 weeks. Electronically signed by: Deepa Darnell MD    Note: This note was completed using voice recognition software.   Any typographical/name errors or mistakes are unintentional.

## 2021-10-07 ENCOUNTER — HOSPITAL ENCOUNTER (OUTPATIENT)
Dept: PHYSICAL THERAPY | Age: 56
Discharge: HOME | End: 2021-10-07
Attending: ORTHOPAEDIC SURGERY
Payer: COMMERCIAL

## 2021-10-07 PROCEDURE — 97110 THERAPEUTIC EXERCISES: CPT | Performed by: PHYSICAL THERAPIST

## 2021-10-07 PROCEDURE — 97530 THERAPEUTIC ACTIVITIES: CPT | Performed by: PHYSICAL THERAPIST

## 2021-10-07 PROCEDURE — 97112 NEUROMUSCULAR REEDUCATION: CPT | Performed by: PHYSICAL THERAPIST

## 2021-10-07 PROCEDURE — 97014 ELECTRIC STIMULATION THERAPY: CPT | Performed by: PHYSICAL THERAPIST

## 2021-10-07 NOTE — PROGRESS NOTES
PT DAILY TREATMENT NOTE     Patient Name: Andrea Ryan  Date:10/7/2021  : 1965  [x]  Patient  Verified  Payor: Bita Golden / Plan: Christen Jaffe PPO / Product Type: PPO /    In time:9:00A  Out time:1001A  Total Treatment Time (min): 61min  Visit #: 2 of 8    Treatment Area: Pain in left shoulder [M25.512]    SUBJECTIVE  Pain Level (0-10 scale): 210  Any medication changes, allergies to medications, adverse drug reactions, diagnosis change, or new procedure performed?: [x] No    [] Yes (see summary sheet for update)  Subjective functional status/changes:   [] No changes reported  Patient states she can now reach the bottom shelf of her cabinet with a little less difficulty but still unable to reach her top shelf. OBJECTIVE            Modality rationale: decrease edema, decrease inflammation, decrease pain, increase tissue extensibility and increase muscle contraction/control to improve the patients ability to perform ADL    Min Type Additional Details     16  [x]? ? Estim:  [x]? ? Unatt       [x]? ?IFC  []? ?Premod                        []? ? Other:  [x]? ?w/ice   []? ?w/heat  Position: sitting up  Location: Left shoulder       []? ? Estim: []??Att    []? ?TENS instruct  []? ?NMES                    []? ? Other:  []??w/US   []? ?w/ice   []? ?w/heat  Position:  Location:       []? ?  Traction: []?? Cervical       []? ?Lumbar                       []? ? Prone          []? ?Supine                       []? ?Intermittent   []? ? Continuous Lbs:  []?? before manual  []? ? after manual       []? ?  Ultrasound: []??Continuous   []? ? Pulsed                           []? ?1MHz   []? ? 3MHz W/cm2:  Location:       []? ?  Iontophoresis with dexamethasone         Location: []?? Take home patch   []? ? In clinic       []? ?  Ice     []? ?  heat  []? ?  Ice massage  []? ?  Laser   []? ?  Anodyne Position:  Location:       []? ?  Laser with stim  []? ?  Other:  Position:  Location:       []? ?  Vasopneumatic Device    []? ?Boeing     []? ?  Left  Pre-treatment girth:  Post-treatment girth:  Measured at (location):  Pressure:       []? ? lo []? ? med []?? hi   Temperature: []?? lo []? ? med []?? hi     [x]? ? Skin assessment post-treatment:  [x]? ? intact []? ?redness- no adverse reaction    []? ?redness  adverse reaction:     15 min Therapeutic Exercise:  [x]? ? See flow sheet :   Rationale: increase ROM and increase strength to improve the patients ability to increase A/ROM and decrease pain with movement.     15 min Therapeutic Activity:  [x]? ?  See flow sheet :   Rationale: increase strength and improve coordination  to improve the patients ability to Tolerate basic ADLs and household-related tasks without pain.      15 min Neuromuscular Re-education:  [x]? ?  See flow sheet :   Rationale: improve coordination, improve balance and increase proprioception  to improve the patients ability to the patients ability to perform functional tasks such as overhead reach when appropriate. Included hands-on stretching and scapulothoracic mobs to Left shoulder to maximize neural mobility and decreased sensitivity. With   [x] TE   [x] TA   [] neuro   [] other: Patient Education: [x] Review HEP    [x] Progressed/Changed HEP based on:   [] positioning   [] body mechanics   [] transfers   [] heat/ice application    [] other:      Other Objective/Functional Measures: Able to achieve 170 degrees abduction in sidelying with minimal assistance. Pain Level (0-10 scale) post treatment: 0/10    ASSESSMENT/Changes in Function: Patient is progressing with increased activity tolerance, slow progression in strength. Spent time updating home program and providing tools to advance PRE at home. Education on importance of diligence with home program as therapy sessions are decreasing to 1x/week.      Patient will continue to benefit from skilled PT services to modify and progress therapeutic interventions, address functional mobility deficits, address ROM deficits, address strength deficits, analyze and address soft tissue restrictions, analyze and cue movement patterns, analyze and modify body mechanics/ergonomics, assess and modify postural abnormalities, address imbalance/dizziness and instruct in home and community integration to attain remaining goals. [x]  See Plan of Care  []  See progress note/recertification  []  See Discharge Summary         Progress towards goals / Updated goals: 1. Pt will increase FOTO score to 64 points to improve ability to perform ADLs.              PN  59  9/27/21  2. Pt will be able to reach into a shoulder height shelf 1-2#  with the left UE with mild to no pain or difficulty to improve independence in the home.              LL : patient reports moderate difficulty with placing cones at a shelf height slightly above shoulder height. Current: minimal difficulty - progressing  3. Pt will be able to hold medium weight Items (3-5#) with little to no difficulty while performing household activities.               PN reports it could be better, that she is still weak.    Current: Can perform 3# bicep curls for 4 sets of 5 reps - Progressing  4 patient will have MMT left shoulder F and abd 4+ for carryover to activities at work              PN F 4 and abd 4-                PLAN  [x]  Upgrade activities as tolerated     []  Continue plan of care  []  Update interventions per flow sheet       []  Discharge due to:_  []  Other:_      Sushil Hale, PT 10/7/2021  9:23 AM    Future Appointments   Date Time Provider Deisy Palomo   11/9/2021  9:00  [address] [facility]   11/17/2021  1:00 PM Emma Holland MD [facility] BS AMB   8/22/2022  9:00 AM Yesi Leroy MD [facility] BS AMB

## 2021-10-13 ENCOUNTER — APPOINTMENT (OUTPATIENT)
Dept: PHYSICAL THERAPY | Age: 56
End: 2021-10-13
Attending: ORTHOPAEDIC SURGERY

## 2021-11-02 ENCOUNTER — APPOINTMENT (OUTPATIENT)
Dept: PHYSICAL THERAPY | Age: 56
End: 2021-11-02
Attending: ORTHOPAEDIC SURGERY

## 2021-11-10 ENCOUNTER — APPOINTMENT (OUTPATIENT)
Dept: PHYSICAL THERAPY | Age: 56
End: 2021-11-10
Attending: ORTHOPAEDIC SURGERY

## 2021-11-16 ENCOUNTER — APPOINTMENT (OUTPATIENT)
Dept: PHYSICAL THERAPY | Age: 56
End: 2021-11-16
Attending: ORTHOPAEDIC SURGERY

## 2021-11-23 ENCOUNTER — APPOINTMENT (OUTPATIENT)
Dept: PHYSICAL THERAPY | Age: 56
End: 2021-11-23
Attending: ORTHOPAEDIC SURGERY

## 2021-12-07 ENCOUNTER — APPOINTMENT (OUTPATIENT)
Dept: PHYSICAL THERAPY | Age: 56
End: 2021-12-07
Attending: ORTHOPAEDIC SURGERY

## 2021-12-14 ENCOUNTER — OFFICE VISIT (OUTPATIENT)
Dept: ORTHOPEDIC SURGERY | Age: 56
End: 2021-12-14
Payer: COMMERCIAL

## 2021-12-14 VITALS
TEMPERATURE: 97.3 F | WEIGHT: 293 LBS | HEIGHT: 67 IN | BODY MASS INDEX: 45.99 KG/M2 | HEART RATE: 80 BPM | OXYGEN SATURATION: 99 %

## 2021-12-14 DIAGNOSIS — M75.122 NONTRAUMATIC COMPLETE TEAR OF LEFT ROTATOR CUFF: Primary | ICD-10-CM

## 2021-12-14 DIAGNOSIS — S46.102D INJURY OF TENDON OF LONG HEAD OF LEFT BICEPS, SUBSEQUENT ENCOUNTER: ICD-10-CM

## 2021-12-14 PROCEDURE — 99213 OFFICE O/P EST LOW 20 MIN: CPT | Performed by: ORTHOPAEDIC SURGERY

## 2021-12-14 NOTE — PROGRESS NOTES
Patient: Gege Fish                MRN: [id number]       SSN: [id number]  YOB: 1965        AGE: 64 y.o. SEX: female  Body mass index is 68.76 kg/m². PCP: Charlie Piña MD  12/14/21    Chief Complaint: Left shoulder follow up    HPI: Gege Fish is a 64 y.o. female patient who returns to the office today for her left shoulder. She is now about 8 months out from her left shoulder arthroscopic rotator cuff repair and doing well. She has minimal complaints of pain only occasional pain. She is no longer in physical therapy. Past Medical History:   Diagnosis Date    Adverse effect of anesthesia     \"Epidural\" allergy    Anemia     anemia    Arthritis     lft knee     Asthma     Lymphedema     R leg    Obesity     Obesity     Pulmonary emboli ([address] 05/2021    DVTs lower extremities, denies PE       Family History   Problem Relation Age of Onset    Cancer Mother     Diabetes Mother     Hypertension Mother     Cancer Father     No Known Problems Brother        Current Outpatient Medications   Medication Sig Dispense Refill    albuterol (PROVENTIL HFA, VENTOLIN HFA, PROAIR HFA) 90 mcg/actuation inhaler Take  by inhalation as needed for Wheezing.  multivitamin (ONE A DAY) tablet Take 1 Tab by mouth daily.  cyanocobalamin, vitamin B-12, (Vitamin B-12) 5,000 mcg subl 5,000 mcg by SubLINGual route daily. Dissolvable      acetaminophen (Tylenol Extra Strength) 500 mg tablet Take  by mouth every six (6) hours as needed for Pain.  cyclobenzaprine (FLEXERIL) 10 mg tablet TAKE 1 TABLET BY MOUTH NIGHTLY.  INDICATIONS: MUSCLE SPASM (Patient not taking: Reported on 8/24/2021) 30 Tablet 0    apixaban (Eliquis DVT-PE Treat 30D Start) 5 mg (74 tabs) starter pack Take 10 mg (two 5 mg tablets) by mouth twice a day for 7 days   Followed by 5 mg (one 5 mg tablet) by mouth twice a day (Patient not taking: Reported on 8/16/2021) 1 Dose Pack 0       Allergies   Allergen Reactions    Other Medication Itching     Patient states \"Epidural\"     Tetanus And Diphtheria Toxoids Hives and Nausea and Vomiting       Past Surgical History:   Procedure Laterality Date    COLONOSCOPY N/A 7/30/2021    COLONOSCOPY w polypectomies performed by Jay Alcazar MD at [facility]     [address] HX ROTATOR CUFF REPAIR Left 04/12/2021    LA ANESTH,SURGERY OF SHOULDER Left 04/12/2021       Social History     Socioeconomic History    Marital status:      Spouse name: Not on file    Number of children: Not on file    Years of education: Not on file    Highest education level: Not on file   Occupational History    Not on file   Tobacco Use    Smoking status: Never Smoker    Smokeless tobacco: Never Used   Vaping Use    Vaping Use: Never used   Substance and Sexual Activity    Alcohol use: No    Drug use: Never    Sexual activity: Not on file   Other Topics Concern    Not on file   Social History Narrative    ** Merged History Encounter **          Social Determinants of Health     Financial Resource Strain:     Difficulty of Paying Living Expenses: Not on file   Food Insecurity:     Worried About [address] in the Last Year: Not on file    Raghu of Food in the Last Year: Not on file   Transportation Needs:     Lack of Transportation (Medical): Not on file    Lack of Transportation (Non-Medical):  Not on file   Physical Activity:     Days of Exercise per Week: Not on file    Minutes of Exercise per Session: Not on file   Stress:     Feeling of Stress : Not on file   Social Connections:     Frequency of Communication with Friends and Family: Not on file    Frequency of Social Gatherings with Friends and Family: Not on file    Attends Buddhist Services: Not on file    Active Member of Clubs or Organizations: Not on file    Attends Club or Organization Meetings: Not on file    Marital Status: Not on file Intimate Partner Violence:     Fear of Current or Ex-Partner: Not on file    Emotionally Abused: Not on file    Physically Abused: Not on file    Sexually Abused: Not on file   Housing Stability:     Unable to Pay for Housing in the Last Year: Not on file    Number of [place] in the Last Year: Not on file    Unstable Housing in the Last Year: Not on file       REVIEW OF SYSTEMS:      No changes from previous review of systems unless noted. PHYSICAL EXAMINATION:  Visit Vitals  Pulse 80   Temp 97.3 °F (36.3 °C) (Temporal)   Ht 5' 7\" (1.702 m)   Wt (!) 439 lb (199.1 kg)   SpO2 99%   BMI 68.76 kg/m²     Body mass index is 68.76 kg/m². GENERAL: Alert and oriented x3, in no acute distress. HEENT: Normocephalic, atraumatic. RESP: Non labored breathing. SKIN: No rashes or lesions noted. Shoulder Examination     R   L  ROM   FF  Full   Full  ER  Full   Full   IR  Full   Full  Rotator Cuff Pain   Supra  -   -   Infra  -   -   Subscap -   -  Crepitus  -   -  Effusion  -   -  Warmth  -   -   Erythema  -   -  Instability  -   -  AC Joint TTP  -   -  Clavicle   Deformity -   -   TTP  -   -  Proximal Humerus   Deformity -   -   TTP  -   -  Deltoid Strength 5   5  Biceps Strength 5   5  Biceps Deformity -   -  Biceps Groove Pain -   -  Impingement Sign -   -       IMAGING:  No imaging today    ASSESSMENT & PLAN  Diagnosis: 8 months status post left shoulder arthroscopic rotator cuff repair doing well    Tamar Sheriff is doing well recovering from her surgery. At this point time I recommend continuing with return to all activities as tolerated and follow-up as needed for her left shoulder. Electronically signed by: Citlalli Hays MD    Note: This note was completed using voice recognition software.   Any typographical/name errors or mistakes are unintentional.

## 2022-01-04 NOTE — PROGRESS NOTES
[address]      [address] [place] [address], [address] [place] [address], [address] [place], [place] [address]  [phone] fax    Discharge Summary    Patient name: Kira Mccormack     Start of Care:  21  Referral source: Gricelda Chao, [place]    : 1965  Medical/Treatment Diagnosis: Pain in left shoulder [M25.512]  Payor: Trina Contreras / Plan: Vicente Hernandez PPO / Product Type: PPO /  Onset Date:DOS 21  Prior Hospitalization: see medical history   Provider#: [id number]  Comorbidities: asthma, lymphedema  Prior Level of Function:Independent with ADls, functional, and work activities with pain in the left shoulder before surgery.   Medications: Verified on Patient Summary List    Visits from Start of Care: 30  Missed Visits: 6  Reporting Period : 2021 to 10/07/2021      Summary of Care:  Goal:Pt will increase FOTO score to 64 points to improve ability to perform ADLs  Status at last note/certification: 59  21  Status at discharge: N/A    Goal:Pt will be able to reach into a shoulder height shelf 1-2#  with the left UE with mild to no pain or difficulty to improve independence in the home. Status at last note/certification:minimal difficulty - progressing  Status at discharge:N/A    Goal:Pt will be able to hold medium weight Items (3-5#) with little to no difficulty while performing household activities  Status at last note/certification:Can perform 3# bicep curls for 4 sets of 5 reps - Progressing  Status at discharge: N/A    Goal:patient will have MMT left shoulder F and abd 4+ for carryover to activities at work  Status at last note/certification:F 4 and abd 4-  Status at discharge: N/A      ASSESSMENT/RECOMMENDATIONS:  []Discontinue therapy progressing towards or have reached established goals  []Discontinue therapy due to lack of appreciable progress towards goals  [x]Discontinue therapy due to lack of attendance or compliance  []Other:     Thank you for this referral.     Aki Sheridan Michael, TINY 1/4/2022 9:25 AM

## 2022-01-28 NOTE — LETTER
NOTIFICATION RETURN TO WORK / SCHOOL 
 
6/16/2021 3:44 PM 
 
Ms. Roberto Esparza [address] [facility] [address] To Whom It May Concern: 
 
Roberto Esparza is currently under the care of [address] [place] [address]. She will remain out of work for the next 6 weeks and be reevaluated during that time. If there are questions or concerns please have the patient contact our office.  
 
 
 
Sincerely, 
 
 
CHERELLE Hutchins 
 
                                
 

NOTIFICATION RETURN TO WORK / SCHOOL    6/16/2021 3:47 PM    Ms. Theresa Chavez  [address] [facility] [address]      To Whom It May Concern:    Theresa Chavez is currently under the care of [address] [place] [address]. She will remain out of work for 6 weeks starting July 7, 2021 and be reevaluated at that time. If there are questions or concerns please have the patient contact our office.         Sincerely,      CHERELLE Yeboah
No

## 2022-03-20 PROBLEM — E66.01 OBESITY, MORBID (HCC): Status: ACTIVE | Noted: 2020-08-14

## 2022-05-24 ENCOUNTER — TRANSCRIBE ORDER (OUTPATIENT)
Dept: SCHEDULING | Age: 57
End: 2022-05-24

## 2022-05-24 DIAGNOSIS — N60.02 BREAST CYST, LEFT: Primary | ICD-10-CM

## 2022-05-31 ENCOUNTER — APPOINTMENT (OUTPATIENT)
Dept: GENERAL RADIOLOGY | Age: 57
End: 2022-05-31
Attending: STUDENT IN AN ORGANIZED HEALTH CARE EDUCATION/TRAINING PROGRAM
Payer: COMMERCIAL

## 2022-05-31 ENCOUNTER — APPOINTMENT (OUTPATIENT)
Dept: CT IMAGING | Age: 57
End: 2022-05-31
Attending: STUDENT IN AN ORGANIZED HEALTH CARE EDUCATION/TRAINING PROGRAM
Payer: COMMERCIAL

## 2022-05-31 ENCOUNTER — HOSPITAL ENCOUNTER (EMERGENCY)
Age: 57
Discharge: HOME | End: 2022-06-01
Attending: STUDENT IN AN ORGANIZED HEALTH CARE EDUCATION/TRAINING PROGRAM
Payer: COMMERCIAL

## 2022-05-31 DIAGNOSIS — J45.21 MILD INTERMITTENT ASTHMA WITH ACUTE EXACERBATION: ICD-10-CM

## 2022-05-31 DIAGNOSIS — R06.00 DYSPNEA, UNSPECIFIED TYPE: Primary | ICD-10-CM

## 2022-05-31 LAB
ANION GAP SERPL CALC-SCNC: 5 MMOL/L (ref 3–18)
BASOPHILS # BLD: 0.1 K/UL (ref 0–0.1)
BASOPHILS NFR BLD: 1 % (ref 0–2)
BNP SERPL-MCNC: 132 PG/ML (ref 0–900)
BUN SERPL-MCNC: 17 MG/DL (ref 7–18)
BUN/CREAT SERPL: 25 (ref 12–20)
CALCIUM SERPL-MCNC: 8.5 MG/DL (ref 8.5–10.1)
CHLORIDE SERPL-SCNC: 107 MMOL/L (ref 100–111)
CO2 SERPL-SCNC: 29 MMOL/L (ref 21–32)
CREAT SERPL-MCNC: 0.68 MG/DL (ref 0.6–1.3)
DIFFERENTIAL METHOD BLD: ABNORMAL
EOSINOPHIL # BLD: 0.2 K/UL (ref 0–0.4)
EOSINOPHIL NFR BLD: 5 % (ref 0–5)
ERYTHROCYTE [DISTWIDTH] IN BLOOD BY AUTOMATED COUNT: 16.2 % (ref 11.6–14.5)
GLUCOSE SERPL-MCNC: 113 MG/DL (ref 74–99)
HCT VFR BLD AUTO: 35.6 % (ref 35–45)
HGB BLD-MCNC: 11 G/DL (ref 12–16)
IMM GRANULOCYTES # BLD AUTO: 0 K/UL (ref 0–0.04)
IMM GRANULOCYTES NFR BLD AUTO: 1 % (ref 0–0.5)
LYMPHOCYTES # BLD: 1.4 K/UL (ref 0.9–3.6)
LYMPHOCYTES NFR BLD: 32 % (ref 21–52)
MCH RBC QN AUTO: 25.5 PG (ref 24–34)
MCHC RBC AUTO-ENTMCNC: 30.9 G/DL (ref 31–37)
MCV RBC AUTO: 82.6 FL (ref 78–100)
MONOCYTES # BLD: 0.5 K/UL (ref 0.05–1.2)
MONOCYTES NFR BLD: 12 % (ref 3–10)
NEUTS SEG # BLD: 2.2 K/UL (ref 1.8–8)
NEUTS SEG NFR BLD: 49 % (ref 40–73)
NRBC # BLD: 0 K/UL (ref 0–0.01)
NRBC BLD-RTO: 0 PER 100 WBC
PLATELET # BLD AUTO: 235 K/UL (ref 135–420)
PMV BLD AUTO: 9.4 FL (ref 9.2–11.8)
POTASSIUM SERPL-SCNC: 3.9 MMOL/L (ref 3.5–5.5)
RBC # BLD AUTO: 4.31 M/UL (ref 4.2–5.3)
SODIUM SERPL-SCNC: 141 MMOL/L (ref 136–145)
TROPONIN-HIGH SENSITIVITY: 34 NG/L (ref 0–54)
WBC # BLD AUTO: 4.4 K/UL (ref 4.6–13.2)

## 2022-05-31 PROCEDURE — 96366 THER/PROPH/DIAG IV INF ADDON: CPT

## 2022-05-31 PROCEDURE — 99285 EMERGENCY DEPT VISIT HI MDM: CPT

## 2022-05-31 PROCEDURE — 83880 ASSAY OF NATRIURETIC PEPTIDE: CPT

## 2022-05-31 PROCEDURE — 84484 ASSAY OF TROPONIN QUANT: CPT

## 2022-05-31 PROCEDURE — 71045 X-RAY EXAM CHEST 1 VIEW: CPT

## 2022-05-31 PROCEDURE — 85025 COMPLETE CBC W/AUTO DIFF WBC: CPT

## 2022-05-31 PROCEDURE — 0202U NFCT DS 22 TRGT SARS-COV-2: CPT

## 2022-05-31 PROCEDURE — 80048 BASIC METABOLIC PNL TOTAL CA: CPT

## 2022-05-31 PROCEDURE — 94640 AIRWAY INHALATION TREATMENT: CPT

## 2022-05-31 PROCEDURE — 96365 THER/PROPH/DIAG IV INF INIT: CPT

## 2022-05-31 PROCEDURE — 93005 ELECTROCARDIOGRAM TRACING: CPT

## 2022-05-31 PROCEDURE — 74011000250 HC RX REV CODE- 250: Performed by: STUDENT IN AN ORGANIZED HEALTH CARE EDUCATION/TRAINING PROGRAM

## 2022-05-31 RX ORDER — IPRATROPIUM BROMIDE AND ALBUTEROL SULFATE 2.5; .5 MG/3ML; MG/3ML
3 SOLUTION RESPIRATORY (INHALATION)
Status: COMPLETED | OUTPATIENT
Start: 2022-05-31 | End: 2022-05-31

## 2022-05-31 RX ADMIN — IPRATROPIUM BROMIDE AND ALBUTEROL SULFATE 3 ML: .5; 2.5 SOLUTION RESPIRATORY (INHALATION) at 22:04

## 2022-06-01 VITALS
OXYGEN SATURATION: 100 % | TEMPERATURE: 98.8 F | DIASTOLIC BLOOD PRESSURE: 70 MMHG | RESPIRATION RATE: 20 BRPM | SYSTOLIC BLOOD PRESSURE: 132 MMHG | HEART RATE: 80 BPM

## 2022-06-01 LAB
APTT PPP: 25.9 SEC (ref 23–36.4)
ATRIAL RATE: 84 BPM
B PERT DNA SPEC QL NAA+PROBE: NOT DETECTED
BORDETELLA PARAPERTUSSIS PCR, BORPAR: NOT DETECTED
C PNEUM DNA SPEC QL NAA+PROBE: NOT DETECTED
CALCULATED P AXIS, ECG09: 47 DEGREES
CALCULATED R AXIS, ECG10: 38 DEGREES
CALCULATED T AXIS, ECG11: 57 DEGREES
DIAGNOSIS, 93000: NORMAL
FLUAV H1 2009 PAND RNA SPEC QL NAA+PROBE: NOT DETECTED
FLUAV H1 RNA SPEC QL NAA+PROBE: NOT DETECTED
FLUAV H3 RNA SPEC QL NAA+PROBE: NOT DETECTED
FLUAV SUBTYP SPEC NAA+PROBE: NOT DETECTED
FLUBV RNA SPEC QL NAA+PROBE: NOT DETECTED
HADV DNA SPEC QL NAA+PROBE: NOT DETECTED
HCOV 229E RNA SPEC QL NAA+PROBE: NOT DETECTED
HCOV HKU1 RNA SPEC QL NAA+PROBE: NOT DETECTED
HCOV NL63 RNA SPEC QL NAA+PROBE: NOT DETECTED
HCOV OC43 RNA SPEC QL NAA+PROBE: NOT DETECTED
HMPV RNA SPEC QL NAA+PROBE: NOT DETECTED
HPIV1 RNA SPEC QL NAA+PROBE: NOT DETECTED
HPIV2 RNA SPEC QL NAA+PROBE: NOT DETECTED
HPIV3 RNA SPEC QL NAA+PROBE: NOT DETECTED
HPIV4 RNA SPEC QL NAA+PROBE: NOT DETECTED
INR PPP: 1.1 (ref 0.8–1.2)
M PNEUMO DNA SPEC QL NAA+PROBE: NOT DETECTED
P-R INTERVAL, ECG05: 166 MS
PROTHROMBIN TIME: 14.3 SEC (ref 11.5–15.2)
Q-T INTERVAL, ECG07: 382 MS
QRS DURATION, ECG06: 88 MS
QTC CALCULATION (BEZET), ECG08: 451 MS
RSV RNA SPEC QL NAA+PROBE: NOT DETECTED
RV+EV RNA SPEC QL NAA+PROBE: NOT DETECTED
SARS-COV-2 PCR, COVPCR: NOT DETECTED
VENTRICULAR RATE, ECG03: 84 BPM

## 2022-06-01 PROCEDURE — 74011250636 HC RX REV CODE- 250/636: Performed by: EMERGENCY MEDICINE

## 2022-06-01 PROCEDURE — 85610 PROTHROMBIN TIME: CPT

## 2022-06-01 PROCEDURE — 74011000250 HC RX REV CODE- 250: Performed by: EMERGENCY MEDICINE

## 2022-06-01 PROCEDURE — 85730 THROMBOPLASTIN TIME PARTIAL: CPT

## 2022-06-01 RX ORDER — MAGNESIUM SULFATE HEPTAHYDRATE 40 MG/ML
2 INJECTION, SOLUTION INTRAVENOUS ONCE
Status: COMPLETED | OUTPATIENT
Start: 2022-06-01 | End: 2022-06-01

## 2022-06-01 RX ORDER — PREDNISONE 20 MG/1
60 TABLET ORAL DAILY
Qty: 15 TABLET | Refills: 0 | Status: SHIPPED | OUTPATIENT
Start: 2022-06-01 | End: 2022-06-06

## 2022-06-01 RX ORDER — IPRATROPIUM BROMIDE AND ALBUTEROL SULFATE 2.5; .5 MG/3ML; MG/3ML
3 SOLUTION RESPIRATORY (INHALATION) ONCE
Status: COMPLETED | OUTPATIENT
Start: 2022-06-01 | End: 2022-06-01

## 2022-06-01 RX ADMIN — IPRATROPIUM BROMIDE AND ALBUTEROL SULFATE 3 ML: .5; 2.5 SOLUTION RESPIRATORY (INHALATION) at 00:27

## 2022-06-01 RX ADMIN — IPRATROPIUM BROMIDE AND ALBUTEROL SULFATE 3 ML: .5; 2.5 SOLUTION RESPIRATORY (INHALATION) at 00:30

## 2022-06-01 RX ADMIN — MAGNESIUM SULFATE HEPTAHYDRATE 2 G: 40 INJECTION, SOLUTION INTRAVENOUS at 00:27

## 2022-06-01 NOTE — ED NOTES
22:00 Assumed patient care by Dr. Jyotsna Kurtz pending CTA and reassessment  When I entered the room to assess patient, asked patient's spouse to wear mask and he initially refused saying that he doesn't need to wear it because he is with his wife. Explained to patient's  that it is currently hospital rule to wear masks while in patient's room and that it is important for everyone's safety including his. Patient expressed understanding but patient's  still very upset with me and shouted at me that I was unprofessional and threatened me that he is going to report me to hospital administration. Asked patient's  to leave the room in order to assess patient but he refused. Charge nurse Kandy Madisno present in the room explained that masks are required by all patients and relatives in the ED. He finally agreed to wear mask. Continued complaining to staff members that I was unprofessional.      Patient is compliant with Eliquis, not high suspicion of PE  Patient with past medical history of asthma and PE presents with shortness of breath for 2 3 weeks  Patient wheezing  Respiratory viral panel negative  Patient feels better after DuoNeb's, steroids but still wheezing  Patient not hypoxic or tachypneic, able to ambulate  Unable to perform CTA due to patient's size as per CT technician  Patient offered admission for continuing breathing treatments and VQ scan  Discussed with Dr. Diane Malik who is willing to admit patient  Patient informed me that does not want to stay in the hospital        2:14 AM    Patient was informed by emergency physician that she needed  admission, admission, further observation, further workup for adequate evaluation for her shortness of breath and that by refusing the above, she is at risk for deterioration. She is awake, alert, and she understands her condition and the risks involved in leaving.          She is clinically aware of her surroundings and able to ask appropriate questions, the patient's spouseand the nurse present confirms she is  able to make medical decisions. She verbalized knowing the risks and understood it was recommended that she stay and could also return at any time. .  The patient was provided with warnings regarding worsening of her condition and were provided instructions to follow up with Silvia Man MD tomorrow or return to the Emergency Room as soon as possible. This information was witnessed by nurse Liana Velasquez.

## 2022-06-01 NOTE — ED PROVIDER NOTES
EMERGENCY DEPARTMENT HISTORY AND PHYSICAL EXAM    I have evaluated the patient at 9:29 PM      Date: 5/31/2022  Patient Name: Meeta Nuñez    History of Presenting Illness     Chief Complaint   Patient presents with    Cough    Asthma         History Provided By: Patient  Location/Duration/Severity/Modifying factors   Patient is a 70-year-old female with history of asthma, obesity, prior PE on Eliquis presenting to the emergency department for evaluation of shortness of breath and cough. Patient reports that she has been having symptoms ongoing for approximately 3 weeks now. She was initially evaluated at an outside facility and was treated for asthma exacerbation and ultimately discharged home. Patient reports that she has not really improved and she is still experiencing shortness of breath and cough. She denies any fevers or chills, chest pain, abdominal pain, NVD. PCP: Dian More MD    Current Facility-Administered Medications   Medication Dose Route Frequency Provider Last Rate Last Admin    albuterol-ipratropium (DUO-NEB) 2.5 MG-0.5 MG/3 ML  3 mL Nebulization NOW Eliane Jaime,          Current Outpatient Medications   Medication Sig Dispense Refill    albuterol (PROVENTIL HFA, VENTOLIN HFA, PROAIR HFA) 90 mcg/actuation inhaler Take  by inhalation as needed for Wheezing.  cyclobenzaprine (FLEXERIL) 10 mg tablet TAKE 1 TABLET BY MOUTH NIGHTLY. INDICATIONS: MUSCLE SPASM (Patient not taking: Reported on 8/24/2021) 30 Tablet 0    apixaban (Eliquis DVT-PE Treat 30D Start) 5 mg (74 tabs) starter pack Take 10 mg (two 5 mg tablets) by mouth twice a day for 7 days   Followed by 5 mg (one 5 mg tablet) by mouth twice a day (Patient not taking: Reported on 8/16/2021) 1 Dose Pack 0    multivitamin (ONE A DAY) tablet Take 1 Tab by mouth daily.  cyanocobalamin, vitamin B-12, (Vitamin B-12) 5,000 mcg subl 5,000 mcg by SubLINGual route daily.  Dissolvable      acetaminophen (Tylenol Extra Strength) 500 mg tablet Take  by mouth every six (6) hours as needed for Pain. Past History     Past Medical History:  Past Medical History:   Diagnosis Date    Adverse effect of anesthesia     \"Epidural\" allergy    Anemia     anemia    Arthritis     lft knee     Asthma     Lymphedema     R leg    Obesity     Obesity     Pulmonary emboli ([address] 2021    DVTs lower extremities, denies PE       Past Surgical History:  Past Surgical History:   Procedure Laterality Date    COLONOSCOPY N/A 2021    COLONOSCOPY w polypectomies performed by Jauna Tidwell MD at [facility] ENDOSCOPY    HX  SECTION      HX ROTATOR CUFF REPAIR Left 2021    RI ANESTH,SURGERY OF SHOULDER Left 2021       Family History:  Family History   Problem Relation Age of Onset    Cancer Mother     Diabetes Mother     Hypertension Mother     Cancer Father     No Known Problems Brother        Social History:  Social History     Tobacco Use    Smoking status: Never Smoker    Smokeless tobacco: Never Used   Vaping Use    Vaping Use: Never used   Substance Use Topics    Alcohol use: No    Drug use: Never       Allergies: Allergies   Allergen Reactions    Other Medication Itching     Patient states \"Epidural\"     Tetanus And Diphtheria Toxoids Hives and Nausea and Vomiting         Review of Systems       Review of Systems   Constitutional: Positive for fatigue. Negative for activity change, chills, diaphoresis and fever. Respiratory: Positive for cough, shortness of breath and wheezing. Negative for chest tightness and stridor. Cardiovascular: Negative for chest pain and palpitations. Gastrointestinal: Negative for abdominal distention, abdominal pain, constipation, diarrhea, nausea and vomiting. Genitourinary: Negative for difficulty urinating, dysuria and hematuria. Musculoskeletal: Negative for back pain, joint swelling and myalgias.    Skin: Negative for rash and wound.   Neurological: Negative for dizziness, weakness, light-headedness, numbness and headaches. Psychiatric/Behavioral: Negative for agitation. The patient is not nervous/anxious. Physical Exam     Visit Vitals  /76 (BP 1 Location: Left upper arm)   Pulse (!) 101   Temp 98.8 °F (37.1 °C)   Resp 18   SpO2 99%         Physical Exam  Constitutional:       General: She is not in acute distress. Appearance: She is obese. She is not toxic-appearing. HENT:      Head: Normocephalic and atraumatic. Mouth/Throat:      Mouth: Mucous membranes are moist.   Cardiovascular:      Rate and Rhythm: Normal rate and regular rhythm. Heart sounds: Normal heart sounds. No murmur heard. No friction rub. No gallop. Pulmonary:      Effort: Pulmonary effort is normal.      Breath sounds: Wheezing present. Abdominal:      General: There is no distension. Palpations: Abdomen is soft. There is no mass. Tenderness: There is no abdominal tenderness. There is no guarding. Hernia: No hernia is present. Musculoskeletal:         General: No swelling, tenderness or deformity. Cervical back: Normal range of motion and neck supple. Right lower leg: Edema present. Left lower leg: Edema present. Skin:     General: Skin is warm and dry. Capillary Refill: Capillary refill takes less than 2 seconds. Findings: No rash. Neurological:      General: No focal deficit present. Mental Status: She is alert and oriented to person, place, and time. Sensory: No sensory deficit. Motor: No weakness.    Psychiatric:         Mood and Affect: Mood normal.           Diagnostic Study Results     Labs -  Recent Results (from the past 12 hour(s))   EKG, 12 LEAD, INITIAL    Collection Time: 05/31/22  9:45 PM   Result Value Ref Range    Ventricular Rate 84 BPM    Atrial Rate 84 BPM    P-R Interval 166 ms    QRS Duration 88 ms    Q-T Interval 382 ms    QTC Calculation (Bezet) 451 ms    Calculated P Axis 47 degrees    Calculated R Axis 38 degrees    Calculated T Axis 57 degrees    Diagnosis       Normal sinus rhythm  Normal ECG  When compared with ECG of 26-APR-2021 17:14,  No significant change was found         Radiologic Studies -   XR CHEST PORT    (Results Pending)   CTA CHEST W OR W WO CONT    (Results Pending)         Medical Decision Making   I am the first provider for this patient. I reviewed the vital signs, available nursing notes, past medical history, past surgical history, family history and social history. Vital Signs-Reviewed the patient's vital signs. EKG: Sinus rhythm without ST elevation or depression. Normal EKG. Records Reviewed: Nursing Notes, Old Medical Records, Previous electrocardiograms, Previous Radiology Studies and Previous Laboratory Studies (Time of Review: 9:29 PM)    ED Course: Progress Notes, Reevaluation, and Consults:         Provider Notes (Medical Decision Making):   MDM  Number of Diagnoses or Management Options  Diagnosis management comments: 80-year-old female presenting to the emergency department for evaluation of shortness of breath and cough. She is in no acute distress. Saturating 99% on room air. Her vital signs here are stable. She appears diffusely edematous. Lung sounds are notable for wheeze. She will be giving albuterol breathing treatment. We will also obtain screening lab work, cardiac enzymes, and given her history of PE with ongoing shortness of breath, CTA of the chest to rule out PE. Swab also obtained. Care will be signed out to Dr. Genaro Bravo to follow-up results of imaging and lab work and to facilitate disposition. Diagnosis     Clinical Impression:   1.  Dyspnea, unspecified type        Disposition: tbd    Follow-up Information    None          Patient's Medications   Start Taking    No medications on file   Continue Taking    ACETAMINOPHEN (TYLENOL EXTRA STRENGTH) 500 MG TABLET    Take  by mouth every six (6) hours as needed for Pain. ALBUTEROL (PROVENTIL HFA, VENTOLIN HFA, PROAIR HFA) 90 MCG/ACTUATION INHALER    Take  by inhalation as needed for Wheezing. APIXABAN (ELIQUIS DVT-PE TREAT 30D START) 5 MG (74 TABS) STARTER PACK    Take 10 mg (two 5 mg tablets) by mouth twice a day for 7 days   Followed by 5 mg (one 5 mg tablet) by mouth twice a day    CYANOCOBALAMIN, VITAMIN B-12, (VITAMIN B-12) 5,000 MCG SUBL    5,000 mcg by SubLINGual route daily. Dissolvable    CYCLOBENZAPRINE (FLEXERIL) 10 MG TABLET    TAKE 1 TABLET BY MOUTH NIGHTLY. INDICATIONS: MUSCLE SPASM    MULTIVITAMIN (ONE A DAY) TABLET    Take 1 Tab by mouth daily. These Medications have changed    No medications on file   Stop Taking    No medications on file     Disclaimer: Sections of this note are dictated using utilizing voice recognition software. Minor typographical errors may be present. If questions arise, please do not hesitate to contact me or call our department.

## 2022-06-01 NOTE — ED TRIAGE NOTES
Patient c/o non productive cough for almost 3 weeks. She was seen at an urgent care 2 weeks ago and was given prednisone and tessalon. She states no improvement. She c/o chest congestion but is unable to cough anything up. Patient uses nebulizer at home without relief.

## 2022-06-01 NOTE — PROGRESS NOTES
Discharge instructions given to patient. Follow up information provided. Prescriptions provided, verbalized understanding.

## 2022-06-01 NOTE — PROGRESS NOTES
Patient states she does not want to be admitted. Patient would like to speak to provider and inform her that she will go home and follow up as needed.

## 2023-01-31 DIAGNOSIS — N60.02 BREAST CYST, LEFT: Primary | ICD-10-CM

## 2023-02-03 DIAGNOSIS — N60.02 BREAST CYST, LEFT: Primary | ICD-10-CM

## 2024-05-06 NOTE — PERIOP NOTES
Dashawn Has from the OR to adjust patient's sling. Patient insurance changed. All meds need 90 day supply and to go to [facility].  Meds are pending

## (undated) DEVICE — SPONGE LAP 18X18IN STRL -- 5/PK

## (undated) DEVICE — BUR SHV CUT HLLW 6 FLUT 5.5MM --

## (undated) DEVICE — SYRINGE MED 25GA 3ML L5/8IN SUBQ PLAS W/ DETACH NDL SFTY

## (undated) DEVICE — 90-S CRUISE, SUCTION PROBE, NON-BENDABLE, MAX CUT LEVEL 1: Brand: SERFAS ENERGY

## (undated) DEVICE — PREP SKN CHLRAPRP APL 26ML STR --

## (undated) DEVICE — GAUZE,SPONGE,4"X4",16PLY,STRL,LF,10/TRAY: Brand: MEDLINE

## (undated) DEVICE — ENDOSCOPY PUMP TUBING/ CAP SET: Brand: ERBE

## (undated) DEVICE — SNARE ENDOSCP POLYP 2.4 MM 240 CM 10 MM 2.8 MM CAPTIVATOR

## (undated) DEVICE — SUTURE ETHLN SZ 2-0 L18IN NONABSORBABLE BLK L19MM PS-2 PRIM 593H

## (undated) DEVICE — BANDAGE,GAUZE,BULKEE II,4.5"X4.1YD,STRL: Brand: MEDLINE

## (undated) DEVICE — MEDI-VAC SUCTION HIGH CAPACITY: Brand: CARDINAL HEALTH

## (undated) DEVICE — BRACE SHLDR M FA L135 145IN UNIV AIRMESH BRTH SLNG 15DEG

## (undated) DEVICE — FORCEPS BX L240CM JAW DIA2.8MM L CAP W/ NDL MIC MESH TOOTH

## (undated) DEVICE — INFLOW CASSETTE TUBING, DO NOT USE IF PACKAGE IS DAMAGED: Brand: CROSSFLOW

## (undated) DEVICE — SHOULDER STABILIZATION KIT,                                    DISPOSABLE 12 PER BOX

## (undated) DEVICE — FLUFF AND POLYMER UNDERPAD,EXTRA HEAVY: Brand: WINGS

## (undated) DEVICE — SOLUTION IRRIG 1000ML H2O STRL BLT

## (undated) DEVICE — CANNULA ORIG TL CLR W FOAM CUSHIONS AND 14FT SUPL TB 3 CHN

## (undated) DEVICE — 3M™ STERI-DRAPE™ U-DRAPE 1015: Brand: STERI-DRAPE™

## (undated) DEVICE — 1010 S-DRAPE TOWEL DRAPE 10/BX: Brand: STERI-DRAPE™

## (undated) DEVICE — 4-PORT MANIFOLD: Brand: NEPTUNE 2

## (undated) DEVICE — CANNULA THREADED DISP 8.5 X 72MM: Brand: CLEAR-TRAC

## (undated) DEVICE — FLEX ADVANTAGE 3000CC: Brand: FLEX ADVANTAGE

## (undated) DEVICE — STERILE POLYISOPRENE POWDER-FREE SURGICAL GLOVES: Brand: PROTEXIS

## (undated) DEVICE — SYR 10ML LUER LOK 1/5ML GRAD --

## (undated) DEVICE — CANNULA THREADED FLEX 8.0 X 72MM: Brand: CLEAR-TRAC

## (undated) DEVICE — SNARE POLYP M W27MMXL240CM OVL STIFF DISP CAPTIVATOR

## (undated) DEVICE — DISK EXT FIX TENS

## (undated) DEVICE — GOWN ISOL IMPERV UNIV, DISP, OPEN BACK, BLUE --

## (undated) DEVICE — SHOULDER ARTHROSCOPY PACK: Brand: MEDLINE INDUSTRIES, INC.

## (undated) DEVICE — MEDI-VAC NON-CONDUCTIVE SUCTION TUBING: Brand: CARDINAL HEALTH

## (undated) DEVICE — PASSER SUT SELF CAPTURE ROT CUF REP REUSE COBRA

## (undated) DEVICE — CATHETER SUCT TR FL TIP 14FR W/ O CTRL

## (undated) DEVICE — KIT CLN UP BON SECOURS MARYV

## (undated) DEVICE — SYR 50ML SLIP TIP NSAF LF STRL --

## (undated) DEVICE — T-MAX DISPOSABLE FACE MASK 8 PER BOX

## (undated) DEVICE — TRAP SPEC COLL POLYP POLYSTYR --

## (undated) DEVICE — BLANKET WRM W40.2XL55.9IN IORT LO BODY + MISTRAL AIR

## (undated) DEVICE — DRAPE,REIN 53X77,STERILE: Brand: MEDLINE

## (undated) DEVICE — SOLUTION IRRIG 3000ML 0.9% SOD CHL FLX CONT 0797208] ICU MEDICAL INC]

## (undated) DEVICE — BLADE RMFG SHV RESECT 4MM --

## (undated) DEVICE — AIRLIFE™ NASAL OXYGEN CANNULA CURVED, NONFLARED TIP WITH 14 FOOT (4.3 M) CRUSH-RESISTANT TUBING, OVER-THE-EAR STYLE: Brand: AIRLIFE™

## (undated) DEVICE — SYR 20ML LL STRL LF --

## (undated) DEVICE — DISPOSABLE MULTI BAG ADAPTERS Y                                    TUBING, STERILE, 2 TO A SET 6 SETS                                    PER BOX